# Patient Record
Sex: FEMALE | Race: WHITE | NOT HISPANIC OR LATINO | Employment: STUDENT | URBAN - METROPOLITAN AREA
[De-identification: names, ages, dates, MRNs, and addresses within clinical notes are randomized per-mention and may not be internally consistent; named-entity substitution may affect disease eponyms.]

---
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
## 2020-01-06 ENCOUNTER — CLINICAL SUPPORT (OUTPATIENT)
Dept: INTERNAL MEDICINE CLINIC | Facility: CLINIC | Age: 20
End: 2020-01-06
Payer: COMMERCIAL

## 2020-01-06 DIAGNOSIS — D50.8 IRON DEFICIENCY ANEMIA SECONDARY TO INADEQUATE DIETARY IRON INTAKE: Primary | ICD-10-CM

## 2020-01-06 DIAGNOSIS — E55.9 VITAMIN D DEFICIENCY: ICD-10-CM

## 2020-01-06 LAB
25(OH)D3 SERPL-MCNC: 37.8 NG/ML (ref 30–100)
ALBUMIN SERPL BCP-MCNC: 3.7 G/DL (ref 3.5–5)
ALP SERPL-CCNC: 54 U/L (ref 46–384)
ALT SERPL W P-5'-P-CCNC: 20 U/L (ref 12–78)
ANION GAP SERPL CALCULATED.3IONS-SCNC: 5 MMOL/L (ref 4–13)
AST SERPL W P-5'-P-CCNC: 12 U/L (ref 5–45)
BASOPHILS # BLD AUTO: 0.05 THOUSANDS/ΜL (ref 0–0.1)
BASOPHILS NFR BLD AUTO: 1 % (ref 0–1)
BILIRUB SERPL-MCNC: 0.38 MG/DL (ref 0.2–1)
BUN SERPL-MCNC: 9 MG/DL (ref 5–25)
CALCIUM SERPL-MCNC: 9.2 MG/DL (ref 8.3–10.1)
CHLORIDE SERPL-SCNC: 108 MMOL/L (ref 100–108)
CO2 SERPL-SCNC: 29 MMOL/L (ref 21–32)
CREAT SERPL-MCNC: 0.46 MG/DL (ref 0.6–1.3)
EOSINOPHIL # BLD AUTO: 0.08 THOUSAND/ΜL (ref 0–0.61)
EOSINOPHIL NFR BLD AUTO: 1 % (ref 0–6)
ERYTHROCYTE [DISTWIDTH] IN BLOOD BY AUTOMATED COUNT: 13 % (ref 11.6–15.1)
FERRITIN SERPL-MCNC: 34 NG/ML (ref 8–388)
GFR SERPL CREATININE-BSD FRML MDRD: 145 ML/MIN/1.73SQ M
GLUCOSE SERPL-MCNC: 84 MG/DL (ref 65–140)
HCT VFR BLD AUTO: 36.6 % (ref 34.8–46.1)
HGB BLD-MCNC: 12 G/DL (ref 11.5–15.4)
IMM GRANULOCYTES # BLD AUTO: 0.04 THOUSAND/UL (ref 0–0.2)
IMM GRANULOCYTES NFR BLD AUTO: 0 % (ref 0–2)
LYMPHOCYTES # BLD AUTO: 2.14 THOUSANDS/ΜL (ref 0.6–4.47)
LYMPHOCYTES NFR BLD AUTO: 23 % (ref 14–44)
MCH RBC QN AUTO: 29.3 PG (ref 26.8–34.3)
MCHC RBC AUTO-ENTMCNC: 32.8 G/DL (ref 31.4–37.4)
MCV RBC AUTO: 90 FL (ref 82–98)
MONOCYTES # BLD AUTO: 0.51 THOUSAND/ΜL (ref 0.17–1.22)
MONOCYTES NFR BLD AUTO: 5 % (ref 4–12)
NEUTROPHILS # BLD AUTO: 6.7 THOUSANDS/ΜL (ref 1.85–7.62)
NEUTS SEG NFR BLD AUTO: 70 % (ref 43–75)
NRBC BLD AUTO-RTO: 0 /100 WBCS
PLATELET # BLD AUTO: 341 THOUSANDS/UL (ref 149–390)
PMV BLD AUTO: 9.6 FL (ref 8.9–12.7)
POTASSIUM SERPL-SCNC: 3.8 MMOL/L (ref 3.5–5.3)
PROT SERPL-MCNC: 6.6 G/DL (ref 6.4–8.2)
RBC # BLD AUTO: 4.09 MILLION/UL (ref 3.81–5.12)
SODIUM SERPL-SCNC: 142 MMOL/L (ref 136–145)
WBC # BLD AUTO: 9.52 THOUSAND/UL (ref 4.31–10.16)

## 2020-01-06 PROCEDURE — 85025 COMPLETE CBC W/AUTO DIFF WBC: CPT | Performed by: FAMILY MEDICINE

## 2020-01-06 PROCEDURE — 82728 ASSAY OF FERRITIN: CPT | Performed by: FAMILY MEDICINE

## 2020-01-06 PROCEDURE — 82306 VITAMIN D 25 HYDROXY: CPT | Performed by: FAMILY MEDICINE

## 2020-01-06 PROCEDURE — 80053 COMPREHEN METABOLIC PANEL: CPT | Performed by: FAMILY MEDICINE

## 2020-01-06 PROCEDURE — 36415 COLL VENOUS BLD VENIPUNCTURE: CPT

## 2020-01-10 ENCOUNTER — OFFICE VISIT (OUTPATIENT)
Dept: INTERNAL MEDICINE CLINIC | Age: 20
End: 2020-01-10
Payer: COMMERCIAL

## 2020-01-10 VITALS
SYSTOLIC BLOOD PRESSURE: 138 MMHG | OXYGEN SATURATION: 97 % | TEMPERATURE: 98.5 F | WEIGHT: 276 LBS | HEART RATE: 74 BPM | DIASTOLIC BLOOD PRESSURE: 70 MMHG | HEIGHT: 64 IN | BODY MASS INDEX: 47.12 KG/M2

## 2020-01-10 DIAGNOSIS — D50.8 IRON DEFICIENCY ANEMIA SECONDARY TO INADEQUATE DIETARY IRON INTAKE: Primary | ICD-10-CM

## 2020-01-10 DIAGNOSIS — I10 ESSENTIAL HYPERTENSION: ICD-10-CM

## 2020-01-10 DIAGNOSIS — E55.9 VITAMIN D DEFICIENCY: ICD-10-CM

## 2020-01-10 PROBLEM — J18.9 PNEUMONIA: Status: RESOLVED | Noted: 2019-11-04 | Resolved: 2020-01-10

## 2020-01-10 PROBLEM — R68.89 FLU-LIKE SYMPTOMS: Status: RESOLVED | Noted: 2019-11-04 | Resolved: 2020-01-10

## 2020-01-10 PROCEDURE — 99214 OFFICE O/P EST MOD 30 MIN: CPT | Performed by: FAMILY MEDICINE

## 2020-01-28 DIAGNOSIS — E55.9 VITAMIN D DEFICIENCY: ICD-10-CM

## 2020-01-28 RX ORDER — ERGOCALCIFEROL 1.25 MG/1
50000 CAPSULE ORAL WEEKLY
Qty: 12 CAPSULE | Refills: 1 | OUTPATIENT
Start: 2020-01-28

## 2020-01-28 RX ORDER — ERGOCALCIFEROL 1.25 MG/1
50000 CAPSULE ORAL
Qty: 12 CAPSULE | Refills: 1 | Status: SHIPPED | OUTPATIENT
Start: 2020-01-28 | End: 2021-01-08 | Stop reason: SDUPTHER

## 2020-01-28 NOTE — TELEPHONE ENCOUNTER
Per Dr Sandra Chand to take Vitamin D2 50,000 units every 14 days  Dr Sandra Chand sent script  Pt  Is aware

## 2020-01-28 NOTE — TELEPHONE ENCOUNTER
Please contact patient:   Vitamin-D level improved compared to last her after ergocalciferol therapy  Now above 30  At this point, will defer on refilling high-dose vitamin-D and recommend taking over-the-counter vitamin-D, 3235-7435 IU daily

## 2020-01-28 NOTE — PROGRESS NOTES
Assessment/Plan:    1  Iron deficiency anemia secondary to inadequate dietary iron intake  -     CBC and differential; Future  -     Ferritin; Future    2  Essential hypertension    3  Vitamin D deficiency  -     Vitamin D 25 hydroxy; Future      BMI Counseling: Body mass index is 47 38 kg/m²  The BMI is above normal  Nutrition recommendations include moderation in carbohydrate intake  Exercise recommendations include exercising 3-5 times per week  No pharmacotherapy was ordered  Continue with vitamin-D weekly as directed previously and then take 5000 International Units every day  Repeat blood work in 6 months  There are no Patient Instructions on file for this visit  No follow-ups on file  Subjective:      Patient ID: Steven Perez is a 23 y o  female  Chief Complaint   Patient presents with    Follow-up     review labs 1/6/20    Foot Pain     right side foot/ tendon pain in the AM when getting out of bed        HPI     Vitamin-D deficiency, last check March 2018, level is 24  Taking over-the-counter vitamin-D daily  July 2019, taking weekly vitamin-D and tolerating well  January 2020, levels of finally improved to 37 8  Taking vitamin-D as directed  Weekly     Hypertension, used to follow up with a pediatric cardiologist for hypertension but recently is now out of the age range  Like to discuss changing medications due to risk of fetal side effects  She is not currently sexually active and has no plans of conceiving but would like to stay on the safe side  Her blood pressures have been well controlled and she was diagnosed at the age of 6  She does not know the details of that and old records are not available from [facility]  She does not check her blood pressure at home but occasionally does when she goes to the store and is well controlled  July 2019, was seen for a routine physical in June and blood pressure was elevated  At that time methyldopa was increased from b i d   To t i d  However she states that she often forgets the middle dose and has been routinely taking it b i d  Rufina [place] Blood pressure today is is well controlled  She states home blood pressures are well controlled  Tolerating it well January 2020, compliant with medication and tolerating it well  Home blood pressure readings are not checked    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      The following portions of the patient's history were reviewed and updated as appropriate: allergies, current medications, past family history, past medical history, past social history, past surgical history and problem list     Review of Systems      Constitutional:  Denies fever or chills   Eyes:  Denies double or blurry vision  HENT:  Denies nasal congestion or sore throat   Respiratory:  Denies cough or shortness of breath or wheezing  Cardiovascular:  Denies palpitations or chest pain  GI:  Denies abdominal pain, nausea, or vomiting  Integument:  Denies rash , no open areas  Neurologic:  Denies headache or focal weakness        Current Outpatient Medications   Medication Sig Dispense Refill    Calcium Carbonate-Vitamin D (CALCIUM 600+D) 600-200 MG-UNIT TABS Take 1 tablet by mouth daily      clindamycin (CLEOCIN T) 1 % lotion Apply 1 application topically 2 (two) times a day as needed       DAILY MULTIPLE VITAMINS PO Take 1 tablet by mouth daily       ergocalciferol (VITAMIN D2) 50,000 units Take 1 capsule (50,000 Units total) by mouth once a week 12 capsule 1    fluticasone (FLONASE) 50 mcg/act nasal spray 2 sprays into each nostril daily      IRON, FERROUS GLUCONATE, PO Take 1 tablet by mouth every other day      levonorgestrel (KYLEENA) 19 5 MG intrauterine device 1 Intra Uterine Device by Intrauterine route once      methyldopa (ALDOMET) 250 mg tablet Take 1 tablet (250 mg total) by mouth 2 (two) times a day 12 tablet 0    Probiotic Product (DIGESTIVE ADVANTAGE PO) Take 1 capsule by mouth every other day No current facility-administered medications for this visit          Objective:    /70 (BP Location: Left arm, Patient Position: Sitting, Cuff Size: Large)   Pulse 74   Temp 98 5 °F (36 9 °C) (Tympanic)   Ht 5' 4" (1 626 m)   Wt 125 kg (276 lb)   SpO2 97%   BMI 47 38 kg/m²        Physical Exam    Constitutional:  Well developed, well nourished, no acute distress, non-toxic appearance   Eyes:  PERRL, conjunctiva normal , non icteric sclera  HENT:  Atraumatic, oropharynx moist  Neck-  supple   Respiratory:  CTA b/l, normal breath sounds, no rales, no wheezing   Cardiovascular:  RRR, no murmurs, no LE edema b/l  GI:  Soft, nondistended, normal bowel sounds x 4, nontender, no organomegaly, no mass, no rebound, no guarding   Neurologic:  no focal deficits noted   Psychiatric:  Speech and behavior appropriate , AAO x 3           Bimal Vincent, DO

## 2020-01-28 NOTE — TELEPHONE ENCOUNTER
Patient is requesting a refill on her Vitamin D 50,000 units that she is taking      Patient uses [address] [facility] [address]

## 2020-03-12 DIAGNOSIS — I10 ESSENTIAL HYPERTENSION: ICD-10-CM

## 2020-03-12 RX ORDER — METHYLDOPA 250 MG/1
TABLET, FILM COATED ORAL
Qty: 180 TABLET | Refills: 0 | OUTPATIENT
Start: 2020-03-12

## 2020-03-16 DIAGNOSIS — I10 ESSENTIAL HYPERTENSION: ICD-10-CM

## 2020-03-16 RX ORDER — METHYLDOPA 250 MG/1
250 TABLET, FILM COATED ORAL 2 TIMES DAILY
Qty: 180 TABLET | Refills: 1 | Status: SHIPPED | OUTPATIENT
Start: 2020-03-16 | End: 2020-08-07 | Stop reason: SDUPTHER

## 2020-03-16 NOTE — TELEPHONE ENCOUNTER
3/16/20 pt last seen 1/10/20, next appt 7/28/20, last filled on 11/27/19 amt 12/0 takes bid, previous filled 180/1

## 2020-07-30 ENCOUNTER — APPOINTMENT (OUTPATIENT)
Dept: LAB | Age: 20
End: 2020-07-30
Payer: COMMERCIAL

## 2020-07-30 DIAGNOSIS — D50.8 IRON DEFICIENCY ANEMIA SECONDARY TO INADEQUATE DIETARY IRON INTAKE: ICD-10-CM

## 2020-07-30 DIAGNOSIS — E55.9 VITAMIN D DEFICIENCY: ICD-10-CM

## 2020-07-30 LAB
25(OH)D3 SERPL-MCNC: 43.2 NG/ML (ref 30–100)
BASOPHILS # BLD AUTO: 0.05 THOUSANDS/ΜL (ref 0–0.1)
BASOPHILS NFR BLD AUTO: 1 % (ref 0–1)
EOSINOPHIL # BLD AUTO: 0.11 THOUSAND/ΜL (ref 0–0.61)
EOSINOPHIL NFR BLD AUTO: 1 % (ref 0–6)
ERYTHROCYTE [DISTWIDTH] IN BLOOD BY AUTOMATED COUNT: 12 % (ref 11.6–15.1)
FERRITIN SERPL-MCNC: 44 NG/ML (ref 8–388)
HCT VFR BLD AUTO: 40.8 % (ref 34.8–46.1)
HGB BLD-MCNC: 13.1 G/DL (ref 11.5–15.4)
IMM GRANULOCYTES # BLD AUTO: 0.02 THOUSAND/UL (ref 0–0.2)
IMM GRANULOCYTES NFR BLD AUTO: 0 % (ref 0–2)
LYMPHOCYTES # BLD AUTO: 2.65 THOUSANDS/ΜL (ref 0.6–4.47)
LYMPHOCYTES NFR BLD AUTO: 30 % (ref 14–44)
MCH RBC QN AUTO: 30.1 PG (ref 26.8–34.3)
MCHC RBC AUTO-ENTMCNC: 32.1 G/DL (ref 31.4–37.4)
MCV RBC AUTO: 94 FL (ref 82–98)
MONOCYTES # BLD AUTO: 0.61 THOUSAND/ΜL (ref 0.17–1.22)
MONOCYTES NFR BLD AUTO: 7 % (ref 4–12)
NEUTROPHILS # BLD AUTO: 5.54 THOUSANDS/ΜL (ref 1.85–7.62)
NEUTS SEG NFR BLD AUTO: 61 % (ref 43–75)
NRBC BLD AUTO-RTO: 0 /100 WBCS
PLATELET # BLD AUTO: 352 THOUSANDS/UL (ref 149–390)
PMV BLD AUTO: 10.1 FL (ref 8.9–12.7)
RBC # BLD AUTO: 4.35 MILLION/UL (ref 3.81–5.12)
WBC # BLD AUTO: 8.98 THOUSAND/UL (ref 4.31–10.16)

## 2020-07-30 PROCEDURE — 82728 ASSAY OF FERRITIN: CPT

## 2020-07-30 PROCEDURE — 85025 COMPLETE CBC W/AUTO DIFF WBC: CPT

## 2020-07-30 PROCEDURE — 36415 COLL VENOUS BLD VENIPUNCTURE: CPT

## 2020-07-30 PROCEDURE — 82306 VITAMIN D 25 HYDROXY: CPT

## 2020-08-07 ENCOUNTER — OFFICE VISIT (OUTPATIENT)
Dept: INTERNAL MEDICINE CLINIC | Age: 20
End: 2020-08-07
Payer: COMMERCIAL

## 2020-08-07 VITALS
OXYGEN SATURATION: 98 % | WEIGHT: 268 LBS | DIASTOLIC BLOOD PRESSURE: 60 MMHG | HEART RATE: 86 BPM | TEMPERATURE: 99.5 F | SYSTOLIC BLOOD PRESSURE: 116 MMHG | BODY MASS INDEX: 45.75 KG/M2 | HEIGHT: 64 IN

## 2020-08-07 DIAGNOSIS — D50.8 IRON DEFICIENCY ANEMIA SECONDARY TO INADEQUATE DIETARY IRON INTAKE: ICD-10-CM

## 2020-08-07 DIAGNOSIS — E55.9 VITAMIN D DEFICIENCY: ICD-10-CM

## 2020-08-07 DIAGNOSIS — I10 ESSENTIAL HYPERTENSION: Primary | ICD-10-CM

## 2020-08-07 PROBLEM — K21.9 GASTROESOPHAGEAL REFLUX DISEASE WITHOUT ESOPHAGITIS: Status: RESOLVED | Noted: 2019-06-28 | Resolved: 2020-08-07

## 2020-08-07 PROCEDURE — 1036F TOBACCO NON-USER: CPT | Performed by: FAMILY MEDICINE

## 2020-08-07 PROCEDURE — 99214 OFFICE O/P EST MOD 30 MIN: CPT | Performed by: FAMILY MEDICINE

## 2020-08-07 PROCEDURE — 3078F DIAST BP <80 MM HG: CPT | Performed by: FAMILY MEDICINE

## 2020-08-07 PROCEDURE — 3008F BODY MASS INDEX DOCD: CPT | Performed by: FAMILY MEDICINE

## 2020-08-07 PROCEDURE — 3074F SYST BP LT 130 MM HG: CPT | Performed by: FAMILY MEDICINE

## 2020-08-07 RX ORDER — METHYLDOPA 250 MG/1
250 TABLET, FILM COATED ORAL 2 TIMES DAILY
Qty: 180 TABLET | Refills: 1 | Status: SHIPPED | OUTPATIENT
Start: 2020-08-07 | End: 2021-03-10

## 2020-08-07 NOTE — PROGRESS NOTES
Assessment/Plan:    1  Essential hypertension  -     methyldopa (ALDOMET) 250 mg tablet; Take 1 tablet (250 mg total) by mouth 2 (two) times a day    2  Iron deficiency anemia secondary to inadequate dietary iron intake    3  Vitamin D deficiency      No need for multivitamin with iron  If eating and drinking okay no need for multivitamin at all  Okay to change iron to Monday Wednesday Friday only  Return 6 months for blood pressure check, no changes in blood pressure medication  Lab work should be done yearly now or p r n     All questions and concerns addressed  Lab work reviewed with her  BMI Counseling: Body mass index is 46 32 kg/m²  The BMI is above normal  Nutrition recommendations include moderation in carbohydrate intake  Exercise recommendations include exercising 3-5 times per week  No pharmacotherapy was ordered  There are no Patient Instructions on file for this visit  Return in about 6 months (around 2/7/2021) for Recheck  Subjective:      Patient ID: Renea Theodore is a 21 y o  female  Chief Complaint   Patient presents with    Follow-up     6 month fu- htn        HPI    Vitamin-D deficiency, last check March 2018, level is 24  Taking over-the-counter vitamin-D daily   July 2019, taking weekly vitamin-D and tolerating well  January 2020, levels of finally improved to 37 8  Taking vitamin-D as directed  Weekly  August 2020, taking weekly vitamin-D without any issues  Had recent lab work done     Hypertension, used to follow up with a pediatric cardiologist for hypertension but recently is now out of the age range  Halina Weber to discuss changing medications due to risk of fetal side effects  Leslie Chapman is not currently sexually active and has no plans of conceiving but would like to stay on the safe side  Cheri Orozco blood pressures have been well controlled and she was diagnosed at the age of 6   She does not know the details of that and old records are not available from [facility]   She does not check her blood pressure at home but occasionally does when she goes to the store and is well controlled  July 2019, was seen for a routine physical in June and blood pressure was elevated   At that time methyldopa was increased from b i d  To t i d  However she states that she often forgets the middle dose and has been routinely taking it b i d     Blood pressure today is is well controlled   She states home blood pressures are well controlled   Tolerating it well January 2020, compliant with medication and tolerating it well  Home blood pressure readings are not checked  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  August 2020, has been working 3 jobs and will soon start her 27 year in college  Lost 10 lb but is not quite sure how  She remains very active and is taking her medications and is compliant    Iron deficiency anemia, taking iron and a multivitamin  She is not sure if her multivitamin has iron    Had lab work done and his hemoglobin is stable       The following portions of the patient's history were reviewed and updated as appropriate: allergies, current medications, past family history, past medical history, past social history, past surgical history and problem list     Review of Systems        Constitutional:  Denies fever or chills ,10 lb weight loss since last visit  Eyes:  Denies double or blurry vision  HENT:  Denies nasal congestion or sore throat   Respiratory:  Denies cough or shortness of breath or wheezing  Cardiovascular:  Denies palpitations or chest pain no heartburn  GI:  Denies abdominal pain, nausea, or vomiting  Integument:  Denies rash , no open areas  Neurologic:  Denies headache or focal weakness no dizziness      Current Outpatient Medications   Medication Sig Dispense Refill    Calcium Carbonate-Vitamin D (CALCIUM 600+D) 600-200 MG-UNIT TABS Take 1 tablet by mouth daily      clindamycin (CLEOCIN T) 1 % lotion Apply 1 application topically 2 (two) times a day as needed       DAILY MULTIPLE VITAMINS PO Take 1 tablet by mouth daily       ergocalciferol (VITAMIN D2) 50,000 units Take 1 capsule (50,000 Units total) by mouth every 14 (fourteen) days 12 capsule 1    fluticasone (FLONASE) 50 mcg/act nasal spray 2 sprays into each nostril daily      IRON, FERROUS GLUCONATE, PO Take 1 tablet by mouth every other day      levonorgestrel (KYLEENA) 19 5 MG intrauterine device 1 Intra Uterine Device by Intrauterine route once      methyldopa (ALDOMET) 250 mg tablet Take 1 tablet (250 mg total) by mouth 2 (two) times a day 180 tablet 1    Probiotic Product (DIGESTIVE ADVANTAGE PO) Take 1 capsule by mouth every other day       No current facility-administered medications for this visit          Objective:    /60 (BP Location: Left arm, Patient Position: Sitting, Cuff Size: Large)   Pulse 86   Temp 99 5 °F (37 5 °C) (Tympanic)   Ht 5' 3 78" (1 62 m) Comment: shoes off  Wt 122 kg (268 lb) Comment: shoes off  SpO2 98%   BMI 46 32 kg/m²        Physical Exam       Constitutional:  Well developed, well nourished, no acute distress, non-toxic appearance   Eyes:  PERRL, conjunctiva normal , non icteric sclera  HENT:  Atraumatic, oropharynx moist  Neck-  supple   Respiratory:  CTA b/l, normal breath sounds, no rales, no wheezing   Cardiovascular:  RRR, no murmurs, no LE edema b/l  GI:  Soft, nondistended, normal bowel sounds x 4, nontender, no organomegaly, no mass, no rebound, no guarding   Neurologic:  no focal deficits noted   Psychiatric:  Speech and behavior appropriate , AAO x 3        Ananda Burows, DO

## 2020-09-25 ENCOUNTER — ANNUAL EXAM (OUTPATIENT)
Dept: OBGYN CLINIC | Facility: CLINIC | Age: 20
End: 2020-09-25
Payer: COMMERCIAL

## 2020-09-25 VITALS
BODY MASS INDEX: 48.37 KG/M2 | HEIGHT: 63 IN | TEMPERATURE: 98.9 F | DIASTOLIC BLOOD PRESSURE: 80 MMHG | SYSTOLIC BLOOD PRESSURE: 138 MMHG | WEIGHT: 273 LBS

## 2020-09-25 DIAGNOSIS — Z01.419 ENCOUNTER FOR GYNECOLOGICAL EXAMINATION WITHOUT ABNORMAL FINDING: ICD-10-CM

## 2020-09-25 DIAGNOSIS — Z11.3 SCREEN FOR STD (SEXUALLY TRANSMITTED DISEASE): Primary | ICD-10-CM

## 2020-09-25 PROCEDURE — 99395 PREV VISIT EST AGE 18-39: CPT | Performed by: PHYSICIAN ASSISTANT

## 2020-09-25 PROCEDURE — 87591 N.GONORRHOEAE DNA AMP PROB: CPT | Performed by: PHYSICIAN ASSISTANT

## 2020-09-25 PROCEDURE — 87491 CHLMYD TRACH DNA AMP PROBE: CPT | Performed by: PHYSICIAN ASSISTANT

## 2020-09-25 NOTE — PROGRESS NOTES
Chata Cruz  2000      CC:  Yearly exam    S:  21 y o  female here for yearly exam  She has no complaints  Her cycles are regular, not heavy or crampy  With her Rodrigo Stammer, they last 8 days instead of her previous 7  This does not bother her  Sexual activity: She is sexually active with a male partner of 2 months and uses Rodrigo Stammer (8/21/19) for contraception  STD testing: She does want STD testing today  Gardasil: She has completed the Gardasil series  We reviewed ASCCP guidelines for Pap testing today       Current Outpatient Medications:     Calcium Carbonate-Vitamin D (CALCIUM 600+D) 600-200 MG-UNIT TABS, Take 1 tablet by mouth daily, Disp: , Rfl:     clindamycin (CLEOCIN T) 1 % lotion, Apply 1 application topically 2 (two) times a day as needed , Disp: , Rfl:     DAILY MULTIPLE VITAMINS PO, Take 1 tablet by mouth daily , Disp: , Rfl:     ergocalciferol (VITAMIN D2) 50,000 units, Take 1 capsule (50,000 Units total) by mouth every 14 (fourteen) days, Disp: 12 capsule, Rfl: 1    fluticasone (FLONASE) 50 mcg/act nasal spray, 2 sprays into each nostril daily, Disp: , Rfl:     IRON, FERROUS GLUCONATE, PO, Take 1 tablet by mouth every other day, Disp: , Rfl:     levonorgestrel (KYLEENA) 19 5 MG intrauterine device, 1 Intra Uterine Device by Intrauterine route once, Disp: , Rfl:     methyldopa (ALDOMET) 250 mg tablet, Take 1 tablet (250 mg total) by mouth 2 (two) times a day, Disp: 180 tablet, Rfl: 1    Probiotic Product (DIGESTIVE ADVANTAGE PO), Take 1 capsule by mouth every other day, Disp: , Rfl:   Social History     Socioeconomic History    Marital status: Single     Spouse name: Not on file    Number of children: Not on file    Years of education: Not on file    Highest education level: Not on file   Occupational History    Not on file   Social Needs    Financial resource strain: Not on file    Food insecurity     Worry: Not on file     Inability: Not on file   [employer] Patient Education     Folliculitis (Child)  Folliculitis is an infection of a hair follicle. A hair follicle is the little pocket where a hair grows out of the skin. Bacteria normally live on the skin. But sometimes bacteria can get trapped in a follicle and cause inflammation. This causes a bumpy rash. The area over the follicles is red and raised. It may itch or be painful. The bumps may have fluid (pus) inside. The pus may leak and then form crusts. Sores can spread to other areas of the body. Once it goes away, folliculitis can come back at any time.  Folliculitis can happen anywhere on a child’s body that hair grows. It can be caused by rubbing from tight clothing. It may also occur if a hair follicle is blocked by a bandage. Shaving the legs or the face may also cause folliculitis.   Sores often go away in a few days with no treatment. In some cases, medication may be given. A small piece of skin or pus may be taken to find the type of bacteria causing the infection.  Home Care  The health care provider may prescribe an antibiotic or antifungal cream or ointment.  Oral antibiotics may also be prescribed. You may also be given an anti-itch medication or lotion for your child. Follow all instructions when using these medications.  General care:  · Apply warm, moist compresses to the sores for 20 minutes up to 3 times a day. You can make a compress by soaking a cloth in warm water. Squeeze out excess water.  · Do not let your child cut, poke, or squeeze the sores. This can be painful and spread infection.  · Make sure your child does not scratch the affected area. Scratching can delay healing.  · If the sores leak fluid, cover the area with a nonstick gauze bandage. Use as little tape as possible. Then call your health care provider and follow all instructions. Carefully discard all soiled bandages.  · Dress your child in loose cotton clothing. Change your child’s clothes daily.  · Change sheets and blankets if  needs     Medical: Not on file     Non-medical: Not on file   Tobacco Use    Smoking status: Never Smoker    Smokeless tobacco: Never Used   Substance and Sexual Activity    Alcohol use: Yes     Frequency: 2-4 times a month     Drinks per session: 1 or 2     Binge frequency: Never    Drug use: No    Sexual activity: Yes     Partners: Male     Birth control/protection: I U D , Condom Male   Lifestyle    Physical activity     Days per week: Not on file     Minutes per session: Not on file    Stress: Not on file   Relationships    Social connections     Talks on phone: Not on file     Gets together: Not on file     Attends [employer] service: Not on file     Active member of club or organization: Not on file     Attends meetings of clubs or organizations: Not on file     Relationship status: Not on file    Intimate partner violence     Fear of current or ex partner: Not on file     Emotionally abused: Not on file     Physically abused: Not on file     Forced sexual activity: Not on file   Other Topics Concern    Not on file   Social History Narrative    Not on file     Family History   Problem Relation Age of Onset    Allergic rhinitis Mother     Hypertension Mother     Diabetes Mother     Cancer Other     Diabetes Other     Thyroid disease Maternal Grandmother      Past Medical History:   Diagnosis Date    Acne     resolved-12/3/2015    Eczema     last assessed-12/3/2015    Gastroesophageal reflux disease without esophagitis 6/28/2019    Hypertension     last assessed-12/11/2017    Iron deficiency anemia 5/21/2019    Pneumonia 11/4/2019    Subluxation of left patella     last assessed-3/31/2015       Review of Systems   Respiratory: Negative  Cardiovascular: Negative  Gastrointestinal: Negative for constipation and diarrhea  Genitourinary: Negative for difficulty urinating, pelvic pain, vaginal bleeding, vaginal discharge, itching or odor      O:   Blood pressure 138/80, temperature 98 9 they are soiled by pus. Wash all clothes, towels, sheets, and cloth diapers in soap and hot water. Do not let your child share clothes, towels, or sheets with other family members.  · If your child’s sores are on the buttocks, discard wipes and disposable diapers with care.  · Don’t soak the sores in bath water. This can spread infection. Instead, keep the area clean by gently washing sores with soap and warm water.  · Wash your hands and have your child wash his or her hands often to stop the bacteria from spreading to the people. You can also use an antibacterial gel to keep hands clean.   Follow-up Care  Follow up with your child’s health care provider if the sores start to leak fluid.  Special Note to Parents  Wash your hands with soap and warm water before and after caring for your child. This is to avoid spreading infection.  When to Get Medical Care  Get prompt medical attention for your child if any of these occur:  · Fever of 100.4°F (38°C) or higher, rectal  · Redness or swelling that gets worse  · Pain that gets worse  · Foul-smelling fluid leaking from the skin  © 5315-9192 [employer]. [address] [place] [address]. All rights reserved. This information is not intended as a substitute for professional medical care. Always follow your healthcare professional's instructions.         Patient Education   Patient Education     Folliculitis (Child)  Folliculitis is an infection of a hair follicle. A hair follicle is the little pocket where a hair grows out of the skin. Bacteria normally live on the skin. But sometimes bacteria can get trapped in a follicle and cause inflammation. This causes a bumpy rash. The area over the follicles is red and raised. It may itch or be painful. The bumps may have fluid (pus) inside. The pus may leak and then form crusts. Sores can spread to other areas of the body. Once it goes away, folliculitis can come back at any time.  Folliculitis can happen  anywhere on a child’s body that hair grows. It can be caused by rubbing from tight clothing. It may also occur if a hair follicle is blocked by a bandage. Shaving the legs or the face may also cause folliculitis.   Sores often go away in a few days with no treatment. In some cases, medication may be given. A small piece of skin or pus may be taken to find the type of bacteria causing the infection.  Home Care  · The health care provider may prescribe an antibiotic or antifungal cream or ointment.  Oral antibiotics may also be prescribed. You may also be given an anti-itch medication or lotion for your child. Follow all instructions when using these medicationsist compresses to the sores for 20 minutes up to 3 times a day. You can make a compress by soaking a cloth in warm water. Squeeze out excess water.  · Do not let your child cut, poke, or squeeze the sores. This can be painful and spread infection.  · Make sure your child does not scratch the affected area. Scratching can delay healing.  · Apply mupirocin three times a day for at least 7 days  · Take sulfa antibiotic twice a day for 7 days  · Recheck if the rash seems to be spreading or at any time if redness or worsening fever.  If the sores leak fluid.  General care:  · Apply warm, mo, cover the area with a nonstick gauze bandage. Use as little tape as possible. Then call your health care provider and follow all instructions. Carefully discard all soiled bandages.  · Dress your child in loose cotton clothing. Change your child’s clothes daily.  · Change sheets and blankets if they are soiled by pus. Wash all clothes, towels, sheets, and cloth diapers in soap and hot water. Do not let your child share clothes, towels, or sheets with other family members.  · If your child’s sores are on the buttocks, discard wipes and disposable diapers with care.  · Don’t soak the sores in bath water. This can spread infection. Instead, keep the area clean by gently washing  °F (37 2 °C), temperature source Tympanic, height 5' 2 99" (1 6 m), weight 124 kg (273 lb), last menstrual period 09/01/2020  Patient appears well and is not in distress  Neck is supple without masses  Breasts are symmetrical without mass, tenderness, nipple discharge, skin changes or adenopathy  Abdomen is soft and nontender without masses  External genitals are normal  Vagina is normal  Cervix is normal, IUD strings are normal    A:   Yearly exam      P:   GC/chlamydia sent today    Consistent condom use recommended if sexually active  She will return in one year for her yearly exam or sooner as needed  sores with soap and warm water.  · Wash your hands and have your child wash his or her hands often to stop the bacteria from spreading to the people. You can also use an antibacterial gel to keep hands clean.   Follow-up Care  Follow up with your child’s health care provider if the sores start to leak fluid.  Special Note to Parents  Wash your hands with soap and warm water before and after caring for your child. This is to avoid spreading infection.  When to Get Medical Care  Get prompt medical attention for your child if any of these occur:  · Fever of 100.4°F (38°C) or higher, rectal  · Redness or swelling that gets worse  · Pain that gets worse  · Foul-smelling fluid leaking from the skin  © 7113-6124 [employer]. [address] [place] [address], [place] [address]. All rights reserved. This information is not intended as a substitute for professional medical care. Always follow your healthcare professional's instructions.           Viral Upper Respiratory Illness (Child)  Your child has a viral upper respiratory illness (URI), which is another term for the common cold. The virus is contagious during the first few days. It is spread through the air by coughing, sneezing, or by direct contact (touching your sick child then touching your own eyes, nose, or mouth). Frequent handwashing will decrease risk of spread. Most viral illnesses resolve within 7 to 14 days with rest and simple home remedies. However, they may sometimes last up to 4 weeks. Antibiotics will not kill a virus and are generally not prescribed for this condition.    Home care  · Fluids: Fever increases water loss from the body. Encourage your child to drink lots of fluids to loosen lung secretions and make it easier to breathe. For infants under 1 year old, continue regular formula or breast feedings. Between feedings, give oral rehydration solution. This is available from drugstores and grocery stores without a prescription. For children over  1 year old, give plenty of fluids, such as water, juice, gelatin water, soda without caffeine, ginger ale, lemonade, or ice pops.  · Eating: If your child doesn't want to eat solid foods, it's OK for a few days, as long as he or she drinks lots of fluid.  · Rest: Keep children with fever at home resting or playing quietly until the fever is gone. Encourage frequent naps. Your child may return to day care or school when the fever is gone and he or she is eating well and feeling better.  · Sleep: Periods of sleeplessness and irritability are common. A congested child will sleep best with the head and upper body propped up on pillows or with the head of the bed frame raised on a 6-inch block. An infant may sleep in a car seat placed in the crib or in a baby swing. If you use a car seat or baby swing, always make certain the baby is safely fastened in the device.  · Cough: Coughing is a normal part of this illness. A cool mist humidifier at the bedside may be helpful. Be sure to clean the humidifier every day to prevent mold. Over-the-counter cough and cold medicines have not proved to be any more helpful than a placebo (syrup with no medicine in it). In addition, these medicines can produce serious side effects, especially in infants under 2 years of age. Do not give over-the-counter cough and cold medicines to children under 6 years unless your healthcare provider has specifically advised you to do so. Also, don’t expose your child to cigarette smoke. It can make the cough worse.  · Nasal congestion: Suction the nose of infants with a bulb syringe. You may put 2 to 3 drops of saltwater (saline) nose drops in each nostril before suctioning. This helps thin and remove secretions. Saline nose drops are available without a prescription. You can also use ¼ teaspoon of table salt dissolved in 1 cup of water.  · Fever: Use children’s acetaminophen for fever, fussiness, or discomfort, unless another medicine was prescribed.  In infants over 6 months of age, you may use children’s ibuprofen or acetaminophen. (Note: If your child has chronic liver or kidney disease or has ever had a stomach ulcer or gastrointestinal bleeding, talk with your healthcare provider before using these medicines.) Aspirin should never be given to anyone younger than 18 years of age who is ill with a viral infection or fever. It may cause severe liver or brain damage.  · Preventing spread: Washing your hands before and after touching your sick child will help prevent a new infection. It will also help prevent the spread of this viral illness to yourself and other children.  Follow-up care  Follow up with your healthcare provider, or as advised.  When to seek medical advice  For a usually healthy child, call your child's healthcare provider right away if any of these occur:  · A fever, as follows:  ¨ Your child is 3 months old or younger and has a fever of 100.4°F (38°C) or higher. Get medical care right away. Fever in a young baby can be a sign of a dangerous infection.  ¨ Your child is of any age and has repeated fevers above 104°F (40°C).  ¨ Your child is younger than 2 years of age and a fever of 100.4°F (38°C) continues for more than 1 day.  ¨ Your child is 2 years old or older and a fever of 100.4°F (38°C) continues for more than 3 days.  · Earache, sinus pain, stiff or painful neck, headache, repeated diarrhea, or vomiting.  · Unusual fussiness.  · A new rash appears.  · Your child is dehydrated, with one or more of these symptoms:  ¨ No tears when crying.  ¨ “Sunken” eyes or a dry mouth.  ¨ No wet diapers for 8 hours in infants.  ¨ Reduced urine output in older children.  Call 911, or get immediate medical care  Contact emergency services if any of these occur:  · Increased wheezing or difficulty breathing  · Unusual drowsiness or confusion  · Fast breathing, as follows:  ¨ Birth to 6 weeks: over 60 breaths per minute.  ¨ 6 weeks to 2 years: over 45  breaths per minute.  ¨ 3 to 6 years: over 35 breaths per minute.  ¨ 7 to 10 years: over 30 breaths per minute.  ¨ Older than 10 years: over 25 breaths per minute.  © 7179-3956 [employer]. [address] [place] [address], [place] [address]. All rights reserved. This information is not intended as a substitute for professional medical care. Always follow your healthcare professional's instructions.

## 2020-10-02 LAB
C TRACH DNA SPEC QL NAA+PROBE: NEGATIVE
N GONORRHOEA DNA SPEC QL NAA+PROBE: NEGATIVE

## 2020-11-04 DIAGNOSIS — E55.9 VITAMIN D DEFICIENCY: ICD-10-CM

## 2020-11-05 RX ORDER — ERGOCALCIFEROL 1.25 MG/1
CAPSULE ORAL
Qty: 6 CAPSULE | Refills: 0 | OUTPATIENT
Start: 2020-11-05

## 2021-01-08 DIAGNOSIS — E55.9 VITAMIN D DEFICIENCY: ICD-10-CM

## 2021-01-08 RX ORDER — ERGOCALCIFEROL 1.25 MG/1
50000 CAPSULE ORAL
Qty: 12 CAPSULE | Refills: 1 | Status: SHIPPED | OUTPATIENT
Start: 2021-01-08 | End: 2021-07-20

## 2021-01-11 DIAGNOSIS — I10 ESSENTIAL HYPERTENSION: Primary | ICD-10-CM

## 2021-01-11 RX ORDER — METHYLDOPA 500 MG/1
500 TABLET, FILM COATED ORAL 3 TIMES DAILY
COMMUNITY
End: 2021-01-11 | Stop reason: SDUPTHER

## 2021-01-11 RX ORDER — METHYLDOPA 500 MG/1
500 TABLET, FILM COATED ORAL DAILY
Qty: 90 TABLET | Refills: 1 | Status: SHIPPED | OUTPATIENT
Start: 2021-01-11 | End: 2021-01-18

## 2021-01-11 NOTE — TELEPHONE ENCOUNTER
1/11/2021  Dr Jayy Escobar-- script was originally  filled  For 250 mg bid-- the 250 mg is on a long term  back order per [facility]- they are out of  All strengths  - please send 500 mg - 1/2 tab bid - still a total of 500 mg daily  [facility]

## 2021-01-18 ENCOUNTER — OFFICE VISIT (OUTPATIENT)
Dept: INTERNAL MEDICINE CLINIC | Facility: CLINIC | Age: 21
End: 2021-01-18
Payer: COMMERCIAL

## 2021-01-18 VITALS
HEIGHT: 62 IN | OXYGEN SATURATION: 98 % | SYSTOLIC BLOOD PRESSURE: 158 MMHG | HEART RATE: 94 BPM | DIASTOLIC BLOOD PRESSURE: 100 MMHG | BODY MASS INDEX: 52.26 KG/M2 | WEIGHT: 284 LBS | TEMPERATURE: 98 F

## 2021-01-18 DIAGNOSIS — Z11.52 ENCOUNTER FOR SCREENING FOR COVID-19: ICD-10-CM

## 2021-01-18 DIAGNOSIS — I10 ESSENTIAL HYPERTENSION: Primary | ICD-10-CM

## 2021-01-18 DIAGNOSIS — L70.0 ACNE VULGARIS: ICD-10-CM

## 2021-01-18 DIAGNOSIS — J01.00 ACUTE NON-RECURRENT MAXILLARY SINUSITIS: ICD-10-CM

## 2021-01-18 PROBLEM — J01.90 ACUTE SINUSITIS: Status: ACTIVE | Noted: 2021-01-18

## 2021-01-18 PROCEDURE — 99213 OFFICE O/P EST LOW 20 MIN: CPT | Performed by: INTERNAL MEDICINE

## 2021-01-18 RX ORDER — CLINDAMYCIN PHOSPHATE 10 UG/ML
1 LOTION TOPICAL 2 TIMES DAILY PRN
Qty: 60 ML | Refills: 1 | Status: SHIPPED | OUTPATIENT
Start: 2021-01-18

## 2021-01-18 NOTE — ASSESSMENT & PLAN NOTE
Defer on antibiotics at this time  Discussed continuing over-the-counter second-generation antihistamines, Flonase, and continue over-the-counter decongestants however recommended she not take any decongestants with pseudoephedrine/ephedrine as these may cause elevation in blood pressure

## 2021-01-18 NOTE — PROGRESS NOTES
Assessment/Plan:    Acute sinusitis  Defer on antibiotics at this time  Discussed continuing over-the-counter second-generation antihistamines, Flonase, and continue over-the-counter decongestants however recommended she not take any decongestants with pseudoephedrine/ephedrine as these may cause elevation in blood pressure  Hypertension  Elevated today however she is acutely ill with a sinus infection and took an over-the-counter decongestant which may have ephedrine/pseudoephedrine  I advised she ensure that decongestant she is taking not have these components and for her to continue to check her blood pressure daily and contact office in 1 -2 weeks with blood pressure readings  For now, continue methyldopa 500 mg daily  Encounter for screening for COVID-19  She is required to have COVID testing prior to returning to school  COVID test ordered  Diagnoses and all orders for this visit:    Essential hypertension    Acne vulgaris  -     clindamycin (CLEOCIN T) 1 % lotion; Apply 1 application topically 2 (two) times a day as needed (ezema)    Encounter for screening for COVID-19  -     Novel Coronavirus (Covid-19),PCR [facility] - Collected at   [facility] or [facility]; Future    Acute non-recurrent maxillary sinusitis                  Subjective:      Patient ID: Nazia Mcpherson is a 21 y o  female  Chief Complaint   Patient presents with    Hypertension     discuss change of medication, headches, congestion, feels heart beat        21year old female is seen today with concern for symptoms that she feels may be related to her BP  Her symptoms consist of frontal and occipital headaches and tachycardia  She has been taking taking Excedrin Migraine for her headaches, which resolves her headaches on occasion  She has been experiencing the headaches 3-4 times a week since 2-3 months  Has been experiencing acute sinusitis since 1 week    She has been taken an over-the-counter decongestant    Regarding hypertension, she has been compliant with methyldopa 500 mg daily  She was informed by her pharmacy that methyldopa lid will no longer be may factor  She has a new prescription she just picked up of methyldopa 500 mg  Hypertension  This is a chronic problem  The current episode started more than 1 year ago  The problem is uncontrolled  Pertinent negatives include no chest pain, headaches, palpitations or shortness of breath  Past treatments include central alpha agonists  Sinusitis  This is a chronic problem  The current episode started more than 1 year ago  The problem is unchanged  There has been no fever  Associated symptoms include coughing (nonproductive ), sinus pressure and a sore throat  Pertinent negatives include no chills, congestion, diaphoresis, headaches, shortness of breath or sneezing  Past treatments include oral decongestants  The treatment provided mild relief  The following portions of the patient's history were reviewed and updated as appropriate: allergies, current medications, past family history, past medical history, past social history, past surgical history and problem list     Review of Systems   Constitutional: Negative for activity change, appetite change, chills, diaphoresis, fatigue and fever  HENT: Positive for sinus pressure and sore throat  Negative for congestion, postnasal drip, rhinorrhea, sinus pain and sneezing  Eyes: Negative for visual disturbance  Respiratory: Positive for cough (nonproductive )  Negative for apnea, choking, chest tightness, shortness of breath and wheezing  Cardiovascular: Negative for chest pain, palpitations and leg swelling  Gastrointestinal: Negative for abdominal distention, abdominal pain, anal bleeding, blood in stool, constipation, diarrhea, nausea and vomiting  Endocrine: Negative for cold intolerance and heat intolerance  Genitourinary: Negative for difficulty urinating, dysuria and hematuria     Musculoskeletal: Negative  Skin: Negative  Neurological: Negative for dizziness, weakness, light-headedness, numbness and headaches  Hematological: Negative for adenopathy  Psychiatric/Behavioral: Negative for agitation, sleep disturbance and suicidal ideas  All other systems reviewed and are negative          Past Medical History:   Diagnosis Date    Acne     resolved-12/3/2015    Eczema     last assessed-12/3/2015    Gastroesophageal reflux disease without esophagitis 6/28/2019    Hypertension     last assessed-12/11/2017    Iron deficiency anemia 5/21/2019    Pneumonia 11/4/2019    Subluxation of left patella     last assessed-3/31/2015         Current Outpatient Medications:     Calcium Carbonate-Vitamin D (CALCIUM 600+D) 600-200 MG-UNIT TABS, Take 1 tablet by mouth daily, Disp: , Rfl:     clindamycin (CLEOCIN T) 1 % lotion, Apply 1 application topically 2 (two) times a day as needed (ezema), Disp: 60 mL, Rfl: 1    DAILY MULTIPLE VITAMINS PO, Take 1 tablet by mouth daily , Disp: , Rfl:     ergocalciferol (VITAMIN D2) 50,000 units, Take 1 capsule (50,000 Units total) by mouth every 14 (fourteen) days, Disp: 12 capsule, Rfl: 1    fluticasone (FLONASE) 50 mcg/act nasal spray, 2 sprays into each nostril daily, Disp: , Rfl:     IRON, FERROUS GLUCONATE, PO, Take 1 tablet by mouth every other day, Disp: , Rfl:     levonorgestrel (KYLEENA) 19 5 MG intrauterine device, 1 Intra Uterine Device by Intrauterine route once, Disp: , Rfl:     methyldopa (ALDOMET) 250 mg tablet, Take 1 tablet (250 mg total) by mouth 2 (two) times a day, Disp: 180 tablet, Rfl: 1    Probiotic Product (DIGESTIVE ADVANTAGE PO), Take 1 capsule by mouth every other day, Disp: , Rfl:     Allergies   Allergen Reactions    Cefdinir Hives       Social History   Past Surgical History:   Procedure Laterality Date    TONSILLECTOMY      WISDOM TOOTH EXTRACTION  03/2020     Family History   Problem Relation Age of Onset    Allergic rhinitis Mother     Hypertension Mother     Diabetes Mother     Cancer Other     Diabetes Other     Thyroid disease Maternal Grandmother        Objective:  /100 (BP Location: Left arm, Patient Position: Sitting, Cuff Size: Large)   Pulse 94   Temp 98 °F (36 7 °C)   Ht 5' 2" (1 575 m)   Wt 129 kg (284 lb)   SpO2 98%   BMI 51 94 kg/m²     No results found for this or any previous visit (from the past 1344 hour(s))  Physical Exam  Vitals signs and nursing note reviewed  Constitutional:       General: She is not in acute distress  Appearance: She is well-developed  She is not diaphoretic  HENT:      Head: Normocephalic and atraumatic  Eyes:      General:         Right eye: No discharge  Left eye: No discharge  Conjunctiva/sclera: Conjunctivae normal       Pupils: Pupils are equal, round, and reactive to light  Neck:      Musculoskeletal: Normal range of motion and neck supple  Thyroid: No thyromegaly  Vascular: No JVD  Cardiovascular:      Rate and Rhythm: Normal rate and regular rhythm  Heart sounds: Normal heart sounds  No murmur  No friction rub  No gallop  Pulmonary:      Effort: Pulmonary effort is normal  No respiratory distress  Breath sounds: Normal breath sounds  No wheezing or rales  Chest:      Chest wall: No tenderness  Abdominal:      General: There is no distension  Palpations: Abdomen is soft  Tenderness: There is no abdominal tenderness  Musculoskeletal: Normal range of motion  General: No tenderness or deformity  Lymphadenopathy:      Cervical: No cervical adenopathy  Skin:     General: Skin is warm and dry  Coloration: Skin is not pale  Findings: No erythema or rash  Neurological:      Mental Status: She is alert and oriented to person, place, and time  Cranial Nerves: No cranial nerve deficit        Coordination: Coordination normal    Psychiatric:         Behavior: Behavior normal  Thought Content:  Thought content normal          Judgment: Judgment normal

## 2021-01-18 NOTE — ASSESSMENT & PLAN NOTE
Elevated today however she is acutely ill with a sinus infection and took an over-the-counter decongestant which may have ephedrine/pseudoephedrine  I advised she ensure that decongestant she is taking not have these components and for her to continue to check her blood pressure daily and contact office in 1 -2 weeks with blood pressure readings  For now, continue methyldopa 500 mg daily

## 2021-01-25 ENCOUNTER — OFFICE VISIT (OUTPATIENT)
Dept: OBGYN CLINIC | Facility: MEDICAL CENTER | Age: 21
End: 2021-01-25
Payer: COMMERCIAL

## 2021-01-25 VITALS — WEIGHT: 284 LBS | DIASTOLIC BLOOD PRESSURE: 80 MMHG | BODY MASS INDEX: 51.94 KG/M2 | SYSTOLIC BLOOD PRESSURE: 136 MMHG

## 2021-01-25 DIAGNOSIS — Z11.3 SCREEN FOR STD (SEXUALLY TRANSMITTED DISEASE): Primary | ICD-10-CM

## 2021-01-25 DIAGNOSIS — R10.2 PELVIC PAIN: ICD-10-CM

## 2021-01-25 PROCEDURE — 87491 CHLMYD TRACH DNA AMP PROBE: CPT | Performed by: PHYSICIAN ASSISTANT

## 2021-01-25 PROCEDURE — 99213 OFFICE O/P EST LOW 20 MIN: CPT | Performed by: PHYSICIAN ASSISTANT

## 2021-01-25 PROCEDURE — 87591 N.GONORRHOEAE DNA AMP PROB: CPT | Performed by: PHYSICIAN ASSISTANT

## 2021-01-25 NOTE — PROGRESS NOTES
Nazia Mcpherson  2000    S:  21 y o  female here for a problem visit  She has had a Dmitri Carreon IUD in place since 8/21/19  She was getting 8 days of bleeding for her cycle with this, as opposed to 7 days prior to her IUD  This did not bother her    She had intercourse a month ago and had a sharp, quick pain  She has been sexually active since and Sueadonovan Carrasquillois has not recurred      She started with vaginal bleeding 5 days ago with her normal period; this is heavier and crampier than usual  She is saturating a tampon every 4-5 hours  She is with her current partner of 8 months and uses condoms as well       Past Medical History:   Diagnosis Date    Acne     resolved-12/3/2015    Eczema     last assessed-12/3/2015    Gastroesophageal reflux disease without esophagitis 6/28/2019    Hypertension     last assessed-12/11/2017    Iron deficiency anemia 5/21/2019    Pneumonia 11/4/2019    Subluxation of left patella     last assessed-3/31/2015     Family History   Problem Relation Age of Onset    Allergic rhinitis Mother     Hypertension Mother     Diabetes Mother     Cancer Other     Diabetes Other     Thyroid disease Maternal Grandmother      Social History     Socioeconomic History    Marital status: Single     Spouse name: None    Number of children: None    Years of education: None    Highest education level: None   Occupational History    None   Social Needs    Financial resource strain: None    Food insecurity     Worry: None     Inability: None    Transportation needs     Medical: None     Non-medical: None   Tobacco Use    Smoking status: Never Smoker    Smokeless tobacco: Never Used   Substance and Sexual Activity    Alcohol use: Yes     Frequency: 2-4 times a month     Drinks per session: 1 or 2     Binge frequency: Never    Drug use: No    Sexual activity: Yes     Partners: Male     Birth control/protection: I U D , Condom Male   Lifestyle    Physical activity     Days per week: None Minutes per session: None    Stress: None   Relationships    Social connections     Talks on phone: None     Gets together: None     Attends Confucianism service: None     Active member of club or organization: None     Attends meetings of clubs or organizations: None     Relationship status: None    Intimate partner violence     Fear of current or ex partner: None     Emotionally abused: None     Physically abused: None     Forced sexual activity: None   Other Topics Concern    None   Social History Narrative    None       Review of Systems   Respiratory: Negative  Cardiovascular: Negative  Gastrointestinal: Negative for constipation and diarrhea  Genitourinary: Negative for difficulty urinating, pelvic pain, vaginal bleeding, vaginal discharge, itching or odor  O:  /80 (BP Location: Right arm, Patient Position: Sitting, Cuff Size: Standard)   Wt 129 kg (284 lb)   LMP 01/21/2021 (Exact Date)   BMI 51 94 kg/m²   She appears well and is in no distress  Abdomen is soft and nontender  External genitals are normal without lesions or rashes  Vagina has scant brown discharge  Cervix is normal, no lesions or discharge  IUD strings are normal  Uterus is nontender, no masses  Adnexa are nontender, no pelvic masses appreciated    A/P: Menorrhagia  Check GC/chlamydia  Observe  If recurrent, will check TSH, pelvic ultrasound

## 2021-01-27 DIAGNOSIS — Z11.52 ENCOUNTER FOR SCREENING FOR COVID-19: ICD-10-CM

## 2021-01-27 LAB — SARS-COV-2 RNA RESP QL NAA+PROBE: NEGATIVE

## 2021-01-27 PROCEDURE — 87635 SARS-COV-2 COVID-19 AMP PRB: CPT | Performed by: INTERNAL MEDICINE

## 2021-01-28 LAB
C TRACH DNA SPEC QL NAA+PROBE: NEGATIVE
N GONORRHOEA DNA SPEC QL NAA+PROBE: NEGATIVE

## 2021-03-09 ENCOUNTER — TELEPHONE (OUTPATIENT)
Dept: INTERNAL MEDICINE CLINIC | Facility: CLINIC | Age: 21
End: 2021-03-09

## 2021-03-09 DIAGNOSIS — I10 ESSENTIAL HYPERTENSION: Primary | ICD-10-CM

## 2021-03-09 NOTE — TELEPHONE ENCOUNTER
Patient in need of a new script for replacement of methylopa prescription (medication is discontinued) to be sent to [facility]   Patient has 4 left

## 2021-03-09 NOTE — TELEPHONE ENCOUNTER
Call patient for clarification  Patient stated Methyldopa medication is no longer going to be manufactured and would like an alternative blood pressure medication prescribed  Pt stated has 4 days left of her current script     Please send alternative to [facility] mail order pharm

## 2021-03-10 DIAGNOSIS — Z23 ENCOUNTER FOR IMMUNIZATION: ICD-10-CM

## 2021-03-10 RX ORDER — AMLODIPINE BESYLATE 5 MG/1
5 TABLET ORAL DAILY
Qty: 90 TABLET | Refills: 0 | Status: SHIPPED | OUTPATIENT
Start: 2021-03-10 | End: 2021-05-27 | Stop reason: SDUPTHER

## 2021-03-10 NOTE — TELEPHONE ENCOUNTER
Please contact patient to inform her that in replace of methyldopa I will start amlodipine 5 mg daily  She should continue to monitor her blood pressure while starting amlodipine to ensure good blood pressure controlled

## 2021-03-17 ENCOUNTER — IMMUNIZATIONS (OUTPATIENT)
Dept: FAMILY MEDICINE CLINIC | Facility: HOSPITAL | Age: 21
End: 2021-03-17

## 2021-03-17 DIAGNOSIS — Z23 ENCOUNTER FOR IMMUNIZATION: Primary | ICD-10-CM

## 2021-03-17 PROCEDURE — 0011A SARS-COV-2 / COVID-19 MRNA VACCINE (MODERNA) 100 MCG: CPT

## 2021-03-17 PROCEDURE — 91301 SARS-COV-2 / COVID-19 MRNA VACCINE (MODERNA) 100 MCG: CPT

## 2021-04-16 ENCOUNTER — IMMUNIZATIONS (OUTPATIENT)
Dept: FAMILY MEDICINE CLINIC | Facility: HOSPITAL | Age: 21
End: 2021-04-16

## 2021-04-16 DIAGNOSIS — Z23 ENCOUNTER FOR IMMUNIZATION: Primary | ICD-10-CM

## 2021-04-16 PROCEDURE — 0012A SARS-COV-2 / COVID-19 MRNA VACCINE (MODERNA) 100 MCG: CPT

## 2021-04-16 PROCEDURE — 91301 SARS-COV-2 / COVID-19 MRNA VACCINE (MODERNA) 100 MCG: CPT

## 2021-04-21 ENCOUNTER — APPOINTMENT (OUTPATIENT)
Dept: RADIOLOGY | Age: 21
End: 2021-04-21
Payer: COMMERCIAL

## 2021-04-21 ENCOUNTER — APPOINTMENT (OUTPATIENT)
Dept: LAB | Age: 21
End: 2021-04-21
Payer: COMMERCIAL

## 2021-04-21 ENCOUNTER — OFFICE VISIT (OUTPATIENT)
Dept: INTERNAL MEDICINE CLINIC | Facility: CLINIC | Age: 21
End: 2021-04-21
Payer: COMMERCIAL

## 2021-04-21 VITALS
BODY MASS INDEX: 47.91 KG/M2 | WEIGHT: 280.6 LBS | SYSTOLIC BLOOD PRESSURE: 142 MMHG | DIASTOLIC BLOOD PRESSURE: 86 MMHG | OXYGEN SATURATION: 100 % | HEART RATE: 78 BPM | HEIGHT: 64 IN | TEMPERATURE: 99.7 F

## 2021-04-21 DIAGNOSIS — E55.9 VITAMIN D DEFICIENCY: ICD-10-CM

## 2021-04-21 DIAGNOSIS — D50.8 IRON DEFICIENCY ANEMIA SECONDARY TO INADEQUATE DIETARY IRON INTAKE: ICD-10-CM

## 2021-04-21 DIAGNOSIS — I10 ESSENTIAL HYPERTENSION: ICD-10-CM

## 2021-04-21 DIAGNOSIS — I10 ESSENTIAL HYPERTENSION: Primary | ICD-10-CM

## 2021-04-21 DIAGNOSIS — R06.02 SOB (SHORTNESS OF BREATH): ICD-10-CM

## 2021-04-21 DIAGNOSIS — Z13.220 SCREENING CHOLESTEROL LEVEL: ICD-10-CM

## 2021-04-21 PROBLEM — J01.90 ACUTE SINUSITIS: Status: RESOLVED | Noted: 2021-01-18 | Resolved: 2021-04-21

## 2021-04-21 LAB
25(OH)D3 SERPL-MCNC: 37.1 NG/ML (ref 30–100)
ALBUMIN SERPL BCP-MCNC: 4.1 G/DL (ref 3.5–5)
ALP SERPL-CCNC: 58 U/L (ref 46–116)
ALT SERPL W P-5'-P-CCNC: 45 U/L (ref 12–78)
ANION GAP SERPL CALCULATED.3IONS-SCNC: 6 MMOL/L (ref 4–13)
AST SERPL W P-5'-P-CCNC: 32 U/L (ref 5–45)
BASOPHILS # BLD AUTO: 0.04 THOUSANDS/ΜL (ref 0–0.1)
BASOPHILS NFR BLD AUTO: 1 % (ref 0–1)
BILIRUB SERPL-MCNC: 0.53 MG/DL (ref 0.2–1)
BUN SERPL-MCNC: 13 MG/DL (ref 5–25)
CALCIUM SERPL-MCNC: 9.3 MG/DL (ref 8.3–10.1)
CHLORIDE SERPL-SCNC: 106 MMOL/L (ref 100–108)
CHOLEST SERPL-MCNC: 158 MG/DL (ref 50–200)
CO2 SERPL-SCNC: 26 MMOL/L (ref 21–32)
CREAT SERPL-MCNC: 0.54 MG/DL (ref 0.6–1.3)
EOSINOPHIL # BLD AUTO: 0.11 THOUSAND/ΜL (ref 0–0.61)
EOSINOPHIL NFR BLD AUTO: 2 % (ref 0–6)
ERYTHROCYTE [DISTWIDTH] IN BLOOD BY AUTOMATED COUNT: 12 % (ref 11.6–15.1)
FERRITIN SERPL-MCNC: 79 NG/ML (ref 8–388)
GFR SERPL CREATININE-BSD FRML MDRD: 135 ML/MIN/1.73SQ M
GLUCOSE P FAST SERPL-MCNC: 69 MG/DL (ref 65–99)
HCT VFR BLD AUTO: 41.6 % (ref 34.8–46.1)
HDLC SERPL-MCNC: 61 MG/DL
HGB BLD-MCNC: 13.4 G/DL (ref 11.5–15.4)
IMM GRANULOCYTES # BLD AUTO: 0.02 THOUSAND/UL (ref 0–0.2)
IMM GRANULOCYTES NFR BLD AUTO: 0 % (ref 0–2)
LDLC SERPL CALC-MCNC: 78 MG/DL (ref 0–100)
LYMPHOCYTES # BLD AUTO: 2.17 THOUSANDS/ΜL (ref 0.6–4.47)
LYMPHOCYTES NFR BLD AUTO: 29 % (ref 14–44)
MCH RBC QN AUTO: 30 PG (ref 26.8–34.3)
MCHC RBC AUTO-ENTMCNC: 32.2 G/DL (ref 31.4–37.4)
MCV RBC AUTO: 93 FL (ref 82–98)
MONOCYTES # BLD AUTO: 0.41 THOUSAND/ΜL (ref 0.17–1.22)
MONOCYTES NFR BLD AUTO: 5 % (ref 4–12)
NEUTROPHILS # BLD AUTO: 4.83 THOUSANDS/ΜL (ref 1.85–7.62)
NEUTS SEG NFR BLD AUTO: 63 % (ref 43–75)
NONHDLC SERPL-MCNC: 97 MG/DL
NRBC BLD AUTO-RTO: 0 /100 WBCS
PLATELET # BLD AUTO: 380 THOUSANDS/UL (ref 149–390)
PMV BLD AUTO: 9.9 FL (ref 8.9–12.7)
POTASSIUM SERPL-SCNC: 3.9 MMOL/L (ref 3.5–5.3)
PROT SERPL-MCNC: 8.1 G/DL (ref 6.4–8.2)
RBC # BLD AUTO: 4.46 MILLION/UL (ref 3.81–5.12)
SODIUM SERPL-SCNC: 138 MMOL/L (ref 136–145)
TRIGL SERPL-MCNC: 93 MG/DL
TSH SERPL DL<=0.05 MIU/L-ACNC: 1.66 UIU/ML (ref 0.36–3.74)
WBC # BLD AUTO: 7.58 THOUSAND/UL (ref 4.31–10.16)

## 2021-04-21 PROCEDURE — 82728 ASSAY OF FERRITIN: CPT

## 2021-04-21 PROCEDURE — 84443 ASSAY THYROID STIM HORMONE: CPT

## 2021-04-21 PROCEDURE — 80053 COMPREHEN METABOLIC PANEL: CPT

## 2021-04-21 PROCEDURE — 36415 COLL VENOUS BLD VENIPUNCTURE: CPT

## 2021-04-21 PROCEDURE — 99214 OFFICE O/P EST MOD 30 MIN: CPT | Performed by: FAMILY MEDICINE

## 2021-04-21 PROCEDURE — 71046 X-RAY EXAM CHEST 2 VIEWS: CPT

## 2021-04-21 PROCEDURE — 85025 COMPLETE CBC W/AUTO DIFF WBC: CPT

## 2021-04-21 PROCEDURE — 80061 LIPID PANEL: CPT

## 2021-04-21 PROCEDURE — 82306 VITAMIN D 25 HYDROXY: CPT

## 2021-04-21 RX ORDER — HYDROCHLOROTHIAZIDE 12.5 MG/1
12.5 TABLET ORAL DAILY
Qty: 90 TABLET | Refills: 0 | Status: SHIPPED | OUTPATIENT
Start: 2021-04-21 | End: 2021-07-15 | Stop reason: SDUPTHER

## 2021-04-21 NOTE — PROGRESS NOTES
Assessment/Plan:    1  Essential hypertension  -     Comprehensive metabolic panel; Future  -     TSH, 3rd generation; Future  -     hydrochlorothiazide (HYDRODIURIL) 12 5 mg tablet; Take 1 tablet (12 5 mg total) by mouth daily  -     Echo complete with contrast if indicated; Future; Expected date: 04/21/2021    2  Iron deficiency anemia secondary to inadequate dietary iron intake  -     CBC and differential; Future  -     Ferritin; Future  -     TSH, 3rd generation; Future    3  Vitamin D deficiency  -     Vitamin D 25 hydroxy; Future  -     TSH, 3rd generation; Future    4  SOB (shortness of breath)  -     XR chest pa & lateral; Future; Expected date: 04/21/2021  -     TSH, 3rd generation; Future  -     Echo complete with contrast if indicated; Future; Expected date: 04/21/2021    5  Screening cholesterol level  -     Lipid panel; Future      BMI Counseling: Body mass index is 48 5 kg/m²  The BMI is above normal  Nutrition recommendations include moderation in carbohydrate intake  Exercise recommendations include exercising 3-5 times per week  No pharmacotherapy was ordered  Add hydrochlorothiazide and check blood pressures at home  E-mail me results in approximately 2 weeks and follow up oncology's over in 1 month  Check chest x-ray lab work and echocardiogram            There are no Patient Instructions on file for this visit  Return in about 4 months (around 8/21/2021) for Recheck  Subjective:      Patient ID: Khoa Torrez is a 24 y o  female  Chief Complaint   Patient presents with    Follow-up     pt c/o congestion & sob after activity, Would like to see cardio again  HPI     Shortness of breath, this is a new occurrence  A few days ago she was practicing her dance and she ended up with shortness of breath that took her 10 minutes to recover    She states her routine was not out of the ordinary and she is very active in working and is a  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  She states her activity was not out of the ordinary but the recovery for shortness of breath was and she got worried  She has never had this before  She has also been having intermittent palpitations which she describes as a quick few 2nd racing of the heart and then 8 is normal   As a child when she was diagnosed with hypertension she was seeing a pediatric cardiologist and she was having echocardiograms done every 2 years  She denies any chest pain any shortness of breath the rest any difficulty with doing steps or activities of daily living    Vitamin-D deficiency, last check March 2018, level is 24  Taking over-the-counter vitamin-D daily   July 2019, taking weekly vitamin-D and tolerating well   January 2020, levels of finally improved to 37 8   Taking vitamin-D as directed  [facility]  August 2020, taking weekly vitamin-D without any issues  Had recent lab work done  April 2021, takes 96889 International Units every 2 weeks  She is due for lab work      Hypertension, used to follow up with a pediatric cardiologist for hypertension but recently is now out of the age range  Chris Gee to discuss changing medications due to risk of fetal side effects  Sai Vaughan is not currently sexually active and has no plans of conceiving but would like to stay on the safe side  Pilar Arellano blood pressures have been well controlled and she was diagnosed at the age of 6  She does not know the details of that and old records are not available from [facility]  Sai Vaughan does not check her blood pressure at home but occasionally does when she goes to the store and is well controlled  July 2019, was seen for a routine physical in June and blood pressure was elevated   At that time methyldopa was increased from b i d  To t i d  However she states that she often forgets the middle dose and has been routinely taking it b i d     Blood pressure today is is well controlled   She states home blood pressures are well controlled   Tolerating it well January 2020, compliant with medication and tolerating it well   Home blood pressure readings are not checked   Is 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  August 2020, has been working 3 jobs and will soon start her 27 year in college  Lost 10 lb but is not quite sure how  She remains very active and is taking her medications and is compliant  April 2021, blood pressure medications were changed from methyldopa to amlodipine about 1 month ago  She has been tracking her blood pressures which have been okay but occasionally she has elevated diastolic blood pressures  She is tolerating the medication without any problems and has not gained weight  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  She also works in [employer] as a  so she is always moving  She states she does not have a high salt diet but eats lunch me almost every day and sometimes uses the packaged sausages      Iron deficiency anemia, taking iron and a multivitamin  She is not sure if her multivitamin has iron  Had lab work done and his hemoglobin is stable  April 2021, last lab work was done July 2020 which was in range    She takes a multivitamin with iron        The following portions of the patient's history were reviewed and updated as appropriate: allergies, current medications, past family history, past medical history, past social history, past surgical history and problem list     Review of Systems      Constitutional:  Denies fever or chills   Eyes:  Denies double or blurry vision  HENT:  Denies nasal congestion or sore throat   Respiratory:  Denies cough or shortness of breath or wheezing  Cardiovascular:  Denies palpitations or chest pain  GI:  Denies abdominal pain, nausea, or vomiting, no loose stools  Integument:  Denies rash , no open areas  Neurologic:  Denies headache or focal weakness, no dizziness        Current Outpatient Medications   Medication Sig Dispense Refill    amLODIPine (NORVASC) 5 mg tablet Take 1 tablet (5 mg total) by mouth daily 90 tablet 0    Calcium Carbonate-Vitamin D (CALCIUM 600+D) 600-200 MG-UNIT TABS Take 1 tablet by mouth daily      clindamycin (CLEOCIN T) 1 % lotion Apply 1 application topically 2 (two) times a day as needed (ezema) 60 mL 1    DAILY MULTIPLE VITAMINS PO Take 1 tablet by mouth daily       ergocalciferol (VITAMIN D2) 50,000 units Take 1 capsule (50,000 Units total) by mouth every 14 (fourteen) days 12 capsule 1    fluticasone (FLONASE) 50 mcg/act nasal spray 2 sprays into each nostril daily      IRON, FERROUS GLUCONATE, PO Take 1 tablet by mouth every other day      levonorgestrel (KYLEENA) 19 5 MG intrauterine device 1 Intra Uterine Device by Intrauterine route once      Probiotic Product (DIGESTIVE ADVANTAGE PO) Take 1 capsule by mouth every other day      hydrochlorothiazide (HYDRODIURIL) 12 5 mg tablet Take 1 tablet (12 5 mg total) by mouth daily 90 tablet 0     No current facility-administered medications for this visit          Objective:    /86 (BP Location: Left arm, Patient Position: Sitting, Cuff Size: Large)   Pulse 78   Temp 99 7 °F (37 6 °C) (Temporal)   Ht 5' 3 78" (1 62 m)   Wt 127 kg (280 lb 9 6 oz)   SpO2 100% Comment: ra  BMI 48 50 kg/m²        Physical Exam       Constitutional:  Well developed, well nourished, no acute distress, non-toxic appearance   Eyes:  PERRL, conjunctiva normal , non icteric sclera  HENT:  Atraumatic, oropharynx moist  Neck-  supple   Respiratory:  CTA b/l, normal breath sounds, no rales, no wheezing   Cardiovascular:  RRR, no murmurs, no LE edema b/l  GI:  Soft, nondistended, normal bowel sounds x 4, nontender, no organomegaly, no mass, no rebound, no guarding   Neurologic:  no focal deficits noted   Psychiatric:  Speech and behavior appropriate , AAO x 3        Lindsey Rodriguez DO

## 2021-05-21 ENCOUNTER — HOSPITAL ENCOUNTER (OUTPATIENT)
Dept: NON INVASIVE DIAGNOSTICS | Facility: CLINIC | Age: 21
Discharge: HOME | End: 2021-05-21
Payer: COMMERCIAL

## 2021-05-21 DIAGNOSIS — I10 ESSENTIAL HYPERTENSION: ICD-10-CM

## 2021-05-21 DIAGNOSIS — R06.02 SOB (SHORTNESS OF BREATH): ICD-10-CM

## 2021-05-21 PROCEDURE — 93306 TTE W/DOPPLER COMPLETE: CPT

## 2021-05-21 PROCEDURE — 93306 TTE W/DOPPLER COMPLETE: CPT | Performed by: INTERNAL MEDICINE

## 2021-05-27 ENCOUNTER — OFFICE VISIT (OUTPATIENT)
Dept: INTERNAL MEDICINE CLINIC | Age: 21
End: 2021-05-27
Payer: COMMERCIAL

## 2021-05-27 VITALS
OXYGEN SATURATION: 98 % | HEIGHT: 64 IN | SYSTOLIC BLOOD PRESSURE: 122 MMHG | DIASTOLIC BLOOD PRESSURE: 70 MMHG | BODY MASS INDEX: 48.4 KG/M2 | WEIGHT: 283.5 LBS | TEMPERATURE: 98.7 F | HEART RATE: 70 BPM

## 2021-05-27 DIAGNOSIS — E55.9 VITAMIN D DEFICIENCY: ICD-10-CM

## 2021-05-27 DIAGNOSIS — I10 ESSENTIAL HYPERTENSION: Primary | ICD-10-CM

## 2021-05-27 PROBLEM — D50.9 IRON DEFICIENCY ANEMIA: Status: RESOLVED | Noted: 2019-05-21 | Resolved: 2021-05-27

## 2021-05-27 PROCEDURE — 99213 OFFICE O/P EST LOW 20 MIN: CPT | Performed by: FAMILY MEDICINE

## 2021-05-27 RX ORDER — AMLODIPINE BESYLATE 5 MG/1
5 TABLET ORAL DAILY
Qty: 90 TABLET | Refills: 1 | Status: SHIPPED | OUTPATIENT
Start: 2021-05-27 | End: 2022-01-18 | Stop reason: SDUPTHER

## 2021-05-27 NOTE — PROGRESS NOTES
Assessment/Plan:    1  Essential hypertension  -     amLODIPine (NORVASC) 5 mg tablet; Take 1 tablet (5 mg total) by mouth daily    2  Vitamin D deficiency    Laboratory data reviewed  No changes to vitamin-D that is twice a week  Continue with current blood pressure medications and advised her to bring in the machine for next appointment in 3 months  At that time we will order lab work for later on  She understands and agrees  Chest x-ray negative as well as echocardiogram         There are no Patient Instructions on file for this visit  Return in about 3 months (around 8/27/2021) for Recheck  Subjective:      Patient ID: Roma Lerma is a 24 y o  female  Chief Complaint   Patient presents with    Follow-up    Hypertension       HPI  Hypertension, used to follow up with a pediatric cardiologist for hypertension but recently is now out of the age range  Elvis Tovar to discuss changing medications due to risk of fetal side effects  Giovana Archer is not currently sexually active and has no plans of conceiving but would like to stay on the safe side  Alice Lindsay blood pressures have been well controlled and she was diagnosed at the age of 6  She does not know the details of that and old records are not available from [facility]  Giovana Archer does not check her blood pressure at home but occasionally does when she goes to the store and is well controlled  July 2019, was seen for a routine physical in June and blood pressure was elevated   At that time methyldopa was increased from b i d  To t i d  However she states that she often forgets the middle dose and has been routinely taking it b i d     Blood pressure today is is well controlled   She states home blood pressures are well controlled   Tolerating it well January 2020, compliant with medication and tolerating it well   Home blood pressure readings are not checked   Is 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  August 2020, has been working 3 jobs and will soon start her 30 year in college   Lost 10 lb but is not quite sure how  Pelon Morales remains very active and is taking her medications and is compliant  April 2021, blood pressure medications were changed from methyldopa to amlodipine about 1 month ago  She has been tracking her blood pressures which have been okay but occasionally she has elevated diastolic blood pressures  She is tolerating the medication without any problems and has not gained weight  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  She also works in [employer] as a  so she is always moving  She states she does not have a high salt diet but eats lunch me almost every day and sometimes uses the packaged sausages  May 2021, new medication hydrochlorothiazide started 6 weeks ago  Significant improvement in blood pressure however home readings still elevated but better in general   She did not bring in her blood pressure machine today to have it checked  She feels well and has no side effects with the new medication    She is taking hydrochlorothiazide and 5 mg of amlodipine daily      The following portions of the patient's history were reviewed and updated as appropriate: allergies, current medications, past family history, past medical history, past social history, past surgical history and problem list     Review of Systems        Constitutional:  Denies fever or chills   Eyes:  Denies double or blurry vision  HENT:  Denies nasal congestion or sore throat   Respiratory:  Denies cough or shortness of breath or wheezing  Cardiovascular:  Denies palpitations or chest pain  GI:  Denies abdominal pain, nausea, or vomiting, no loose stools  Integument:  Denies rash , no open areas  Neurologic:  Denies headache or focal weakness, no dizziness      Current Outpatient Medications   Medication Sig Dispense Refill    amLODIPine (NORVASC) 5 mg tablet Take 1 tablet (5 mg total) by mouth daily 90 tablet 1    Biotin w/ Vitamins C & E (HAIR/SKIN/NAILS PO) Take by mouth      Calcium Carbonate-Vitamin D (CALCIUM 600+D) 600-200 MG-UNIT TABS Take 1 tablet by mouth daily      clindamycin (CLEOCIN T) 1 % lotion Apply 1 application topically 2 (two) times a day as needed (ezema) 60 mL 1    DAILY MULTIPLE VITAMINS PO Take 1 tablet by mouth daily       ergocalciferol (VITAMIN D2) 50,000 units Take 1 capsule (50,000 Units total) by mouth every 14 (fourteen) days 12 capsule 1    fluticasone (FLONASE) 50 mcg/act nasal spray 2 sprays into each nostril daily      hydrochlorothiazide (HYDRODIURIL) 12 5 mg tablet Take 1 tablet (12 5 mg total) by mouth daily 90 tablet 0    IRON, FERROUS GLUCONATE, PO Take 1 tablet by mouth every other day      levonorgestrel (KYLEENA) 19 5 MG intrauterine device 1 Intra Uterine Device by Intrauterine route once      Probiotic Product (DIGESTIVE ADVANTAGE PO) Take 1 capsule by mouth every other day       No current facility-administered medications for this visit          Objective:    /70 (BP Location: Left arm, Patient Position: Sitting, Cuff Size: Large)   Pulse 70   Temp 98 7 °F (37 1 °C) (Temporal)   Ht 5' 3 78" (1 62 m)   Wt 129 kg (283 lb 8 oz)   SpO2 98%   BMI 49 00 kg/m²        Physical Exam    Constitutional:  Well developed, well nourished, no acute distress, non-toxic appearance   Eyes:  PERRL, conjunctiva normal , non icteric sclera  HENT:  Atraumatic, oropharynx moist  Neck-  supple   Respiratory:  CTA b/l, normal breath sounds, no rales, no wheezing   Cardiovascular:  RRR, no murmurs, no LE edema b/l  GI:  Soft, nondistended, normal bowel sounds x 4, nontender, no organomegaly, no mass, no rebound, no guarding   Neurologic:  no focal deficits noted   Psychiatric:  Speech and behavior appropriate , AAO x 3           Adriana Garza DO

## 2021-07-15 DIAGNOSIS — I10 ESSENTIAL HYPERTENSION: ICD-10-CM

## 2021-07-15 RX ORDER — HYDROCHLOROTHIAZIDE 12.5 MG/1
12.5 TABLET ORAL DAILY
Qty: 90 TABLET | Refills: 1 | Status: SHIPPED | OUTPATIENT
Start: 2021-07-15 | End: 2022-03-10 | Stop reason: SDUPTHER

## 2021-07-20 DIAGNOSIS — E55.9 VITAMIN D DEFICIENCY: ICD-10-CM

## 2021-07-20 RX ORDER — ERGOCALCIFEROL 1.25 MG/1
CAPSULE ORAL
Qty: 6 CAPSULE | Refills: 0 | Status: SHIPPED | OUTPATIENT
Start: 2021-07-20 | End: 2021-09-28 | Stop reason: SDUPTHER

## 2021-08-09 ENCOUNTER — OFFICE VISIT (OUTPATIENT)
Dept: INTERNAL MEDICINE CLINIC | Age: 21
End: 2021-08-09
Payer: COMMERCIAL

## 2021-08-09 VITALS
WEIGHT: 286.3 LBS | BODY MASS INDEX: 48.88 KG/M2 | HEART RATE: 79 BPM | OXYGEN SATURATION: 98 % | HEIGHT: 64 IN | DIASTOLIC BLOOD PRESSURE: 70 MMHG | TEMPERATURE: 98.2 F | SYSTOLIC BLOOD PRESSURE: 112 MMHG

## 2021-08-09 DIAGNOSIS — E55.9 VITAMIN D DEFICIENCY: Primary | ICD-10-CM

## 2021-08-09 DIAGNOSIS — D50.8 IRON DEFICIENCY ANEMIA SECONDARY TO INADEQUATE DIETARY IRON INTAKE: ICD-10-CM

## 2021-08-09 DIAGNOSIS — Z23 NEED FOR TDAP VACCINATION: ICD-10-CM

## 2021-08-09 DIAGNOSIS — Z13.220 SCREENING CHOLESTEROL LEVEL: ICD-10-CM

## 2021-08-09 DIAGNOSIS — I10 ESSENTIAL HYPERTENSION: ICD-10-CM

## 2021-08-09 DIAGNOSIS — Z11.59 ENCOUNTER FOR HEPATITIS C SCREENING TEST FOR LOW RISK PATIENT: ICD-10-CM

## 2021-08-09 DIAGNOSIS — E66.9 OBESITY WITHOUT SERIOUS COMORBIDITY WITH BODY MASS INDEX (BMI) IN 99TH PERCENTILE FOR AGE IN PEDIATRIC PATIENT, UNSPECIFIED OBESITY TYPE: ICD-10-CM

## 2021-08-09 PROCEDURE — 99214 OFFICE O/P EST MOD 30 MIN: CPT | Performed by: FAMILY MEDICINE

## 2021-08-09 PROCEDURE — 90471 IMMUNIZATION ADMIN: CPT

## 2021-08-09 PROCEDURE — 90715 TDAP VACCINE 7 YRS/> IM: CPT

## 2021-08-09 NOTE — PROGRESS NOTES
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 patient, unspecified obesity type    4  BMI 45 0-49 9, adult ([address] )    5  Screening cholesterol level    6  Encounter for hepatitis C screening test for low risk patient  -     Hepatitis C antibody; Future    7  Iron deficiency anemia secondary to inadequate dietary iron intake  -     CBC and differential; Future  -     Ferritin; Future    8  Need for Tdap vaccination  -     TDAP VACCINE GREATER THAN OR EQUAL TO 6YO IM            There are no Patient Instructions on file for this visit  Return in about 6 months (around 2/9/2022) for Recheck  Subjective:      Patient ID: Effie Keller is a 24 y o  female  Chief Complaint   Patient presents with    Follow-up    Hypertension       HPI  Hypertension, used to follow up with a pediatric cardiologist for hypertension but recently is now out of the age range  Emilie Dupree to discuss changing medications due to risk of fetal side effects  Kat Serna is not currently sexually active and has no plans of conceiving but would like to stay on the safe side  Gian Marie blood pressures have been well controlled and she was diagnosed at the age of 6  She does not know the details of that and old records are not available from [facility]  Kat Serna does not check her blood pressure at home but occasionally does when she goes to the store and is well controlled  July 2019, was seen for a routine physical in June and blood pressure was elevated   At that time methyldopa was increased from b i d  To t i d  However she states that she often forgets the middle dose and has been routinely taking it b i d     Blood pressure today is is well controlled   She states home blood pressures are well controlled   Tolerating it well January 2020, compliant with medication and tolerating it well   Home blood pressure readings are not checked   Is 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  August 2020, has been working 3 jobs and will soon start her 27 year in college   Lost 10 lb but is not quite sure how  Hay Correa remains very active and is taking her medications and is compliant  April 2021, blood pressure medications were changed from methyldopa to amlodipine about 1 month ago  Hay Correa has been tracking her blood pressures which have been okay but occasionally she has elevated diastolic blood pressures   She is tolerating the medication without any problems and has not gained weight   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  Hay Correa also works in [employer] as a  so she is always moving   She states she does not have a high salt diet but eats lunch me almost every day and sometimes uses the packaged sausages  May 2021, new medication hydrochlorothiazide started 6 weeks ago  Significant improvement in blood pressure however home readings still elevated but better in general   She did not bring in her blood pressure machine today to have it checked  She feels well and has no side effects with the new medication  She is taking hydrochlorothiazide and 5 mg of amlodipine daily   August 2021, blood pressure very well controlled  Taking her medications and has not really changed anything in her diet    No side effects and she is compliant        The following portions of the patient's history were reviewed and updated as appropriate: allergies, current medications, past family history, past medical history, past social history, past surgical history and problem list     Review of Systems        Constitutional:  Denies fever or chills   Eyes:  Denies double or blurry vision  HENT:  Denies nasal congestion or sore throat   Respiratory:  Denies cough or shortness of breath or wheezing  Cardiovascular:  Denies palpitations or chest pain  GI:  Denies abdominal pain, nausea, or vomiting, no loose stools, no reflux  Integument:  Denies rash , no open areas  Neurologic:  Denies headache or focal weakness, no dizziness  : no dysuria      Current Outpatient Medications   Medication Sig Dispense Refill    amLODIPine (NORVASC) 5 mg tablet Take 1 tablet (5 mg total) by mouth daily 90 tablet 1    Biotin w/ Vitamins C & E (HAIR/SKIN/NAILS PO) Take by mouth      Calcium Carbonate-Vitamin D (CALCIUM 600+D) 600-200 MG-UNIT TABS Take 1 tablet by mouth daily      clindamycin (CLEOCIN T) 1 % lotion Apply 1 application topically 2 (two) times a day as needed (ezema) 60 mL 1    DAILY MULTIPLE VITAMINS PO Take 1 tablet by mouth daily       ergocalciferol (VITAMIN D2) 50,000 units TAKE ONE CAPSULE BY MOUTH EVERY 14 DAYS 6 capsule 0    fluticasone (FLONASE) 50 mcg/act nasal spray 2 sprays into each nostril daily      hydrochlorothiazide (HYDRODIURIL) 12 5 mg tablet Take 1 tablet (12 5 mg total) by mouth daily 90 tablet 1    IRON, FERROUS GLUCONATE, PO Take 1 tablet by mouth every other day      levonorgestrel (KYLEENA) 19 5 MG intrauterine device 1 Intra Uterine Device by Intrauterine route once      Probiotic Product (DIGESTIVE ADVANTAGE PO) Take 1 capsule by mouth every other day       No current facility-administered medications for this visit         Objective:    /70 (BP Location: Left arm, Patient Position: Sitting, Cuff Size: Large)   Pulse 79   Temp 98 2 °F (36 8 °C) (Temporal)   Ht 5' 3 78" (1 62 m)   Wt 130 kg (286 lb 4 8 oz)   SpO2 98%   BMI 49 48 kg/m²        Physical Exam       Constitutional:  Well developed, well nourished, no acute distress, non-toxic appearance   Eyes:  PERRL, conjunctiva normal , non icteric sclera  HENT:  Atraumatic, oropharynx moist  Neck-  supple   Respiratory:  CTA b/l, normal breath sounds, no rales, no wheezing   Cardiovascular:  RRR, no murmurs, no LE edema b/l  GI:  Soft, nondistended, normal bowel sounds x 4, nontender, no organomegaly, no mass, no rebound, no guarding   Neurologic:  no focal deficits noted   Psychiatric:  Speech and behavior appropriate , AAO x 3        Morley Knee, DO

## 2021-09-28 ENCOUNTER — TELEMEDICINE (OUTPATIENT)
Dept: INTERNAL MEDICINE CLINIC | Age: 21
End: 2021-09-28
Payer: COMMERCIAL

## 2021-09-28 VITALS — HEIGHT: 64 IN | WEIGHT: 286 LBS | BODY MASS INDEX: 48.83 KG/M2

## 2021-09-28 DIAGNOSIS — E55.9 VITAMIN D DEFICIENCY: ICD-10-CM
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
## 2021-09-28 DIAGNOSIS — M25.552 HIP PAIN, ACUTE, LEFT: ICD-10-CM

## 2021-09-28 PROCEDURE — 99213 OFFICE O/P EST LOW 20 MIN: CPT | Performed by: FAMILY MEDICINE

## 2021-09-28 RX ORDER — ERGOCALCIFEROL 1.25 MG/1
50000 CAPSULE ORAL
Qty: 6 CAPSULE | Refills: 3 | Status: SHIPPED | OUTPATIENT
Start: 2021-09-28 | End: 2022-03-10 | Stop reason: SDUPTHER

## 2021-09-28 NOTE — PROGRESS NOTES
Virtual Regular Visit    Verification of patient location:    Patient is located in the following state in which I hold an active license [place]      Assessment/Plan:    Problem List Items Addressed This Visit        Other    Vitamin D deficiency    Relevant Medications    ergocalciferol (VITAMIN D2) 50,000 units               Reason for visit is   Chief Complaint   Patient presents with    Hip Pain     left, s/p fall        Encounter provider Francisco J Estrada DO    Provider located at [place] [address] [place] [id number]      Recent Visits  No visits were found meeting these conditions  Showing recent visits within past 7 days and meeting all other requirements  Today's Visits  Date Type Provider Dept   09/28/21 [address], DO [facility]   Showing today's visits and meeting all other requirements  Future Appointments  No visits were found meeting these conditions  Showing future appointments within next 150 days and meeting all other requirements       The patient was identified by name and date of birth  Lucinda Chapman was informed that this is a telemedicine visit and that the visit is being conducted through [employer] , HIPAA-compliant platform  She agrees to proceed     My office door was closed  No one else was in the room  She acknowledged consent and understanding of privacy and security of the video platform  The patient has agreed to participate and understands they can discontinue the visit at any time  Patient is aware this is a billable service  Subjective  Lucinda Chapman is a 24 y o  female    HPI     Patient seen with video visit due to complaints of a fall after she put her foot on uneven pavement that felt like a small ditch  Her left foot when in and she went down on her left knee completely on to concrete    Since that time she feels like her left hip and pelvis area have shifted  She is having difficulty doing her dance routine and movements at school and takes Tylenol and Advil which helps with daily walking but not her other movements  She does not describe it as pain but states it is difficult and uncomfortable    At rest she has no discomfort and pain does not wake her up at night    Past Medical History:   Diagnosis Date    Acne     resolved-12/3/2015    Eczema     last assessed-12/3/2015    Gastroesophageal reflux disease without esophagitis 6/28/2019    Hypertension     last assessed-12/11/2017    Iron deficiency anemia 5/21/2019    Pneumonia 11/4/2019    Subluxation of left patella     last assessed-3/31/2015       Past Surgical History:   Procedure Laterality Date    TONSILLECTOMY      WISDOM TOOTH EXTRACTION  03/2020       Current Outpatient Medications   Medication Sig Dispense Refill    amLODIPine (NORVASC) 5 mg tablet Take 1 tablet (5 mg total) by mouth daily 90 tablet 1    Biotin w/ Vitamins C & E (HAIR/SKIN/NAILS PO) Take by mouth      Calcium Carbonate-Vitamin D (CALCIUM 600+D) 600-200 MG-UNIT TABS Take 1 tablet by mouth daily      clindamycin (CLEOCIN T) 1 % lotion Apply 1 application topically 2 (two) times a day as needed (ezema) 60 mL 1    DAILY MULTIPLE VITAMINS PO Take 1 tablet by mouth daily       ergocalciferol (VITAMIN D2) 50,000 units Take 1 capsule (50,000 Units total) by mouth every 14 (fourteen) days 6 capsule 3    fluticasone (FLONASE) 50 mcg/act nasal spray 2 sprays into each nostril daily      hydrochlorothiazide (HYDRODIURIL) 12 5 mg tablet Take 1 tablet (12 5 mg total) by mouth daily 90 tablet 1    IRON, FERROUS GLUCONATE, PO Take 1 tablet by mouth every other day      levonorgestrel (KYLEENA) 19 5 MG intrauterine device 1 Intra Uterine Device by Intrauterine route once      Probiotic Product (DIGESTIVE ADVANTAGE PO) Take 1 capsule by mouth every other day       No current facility-administered medications for this visit  Allergies   Allergen Reactions    Cefdinir Hives       Review of Systems     Constitutional:  Denies fever or chills   Eyes:  Denies double or blurry vision  HENT:  Denies nasal congestion or sore throat   Respiratory:  Denies cough or shortness of breath or wheezing  Cardiovascular:  Denies palpitations or chest pain  GI:  Denies abdominal pain, nausea, or vomiting, no loose stools, no reflux  Integument:  Denies rash , no open areas  Neurologic:  Denies headache or focal weakness, no dizziness  : no dysuria      Video Exam    Vitals:    09/28/21 0957   Weight: 130 kg (286 lb)   Height: 5' 4" (1 626 m)       Physical Exam     Constitutional:  Well developed, well nourished, no acute distress, non-toxic appearance   Eyes:  Is conjunctiva normal , non icteric sclera  HENT:  Atraumatic, non congested  Respiratory:  Nonlabored, appears comfortable, no conversational dyspnea  Cardiovascular:   no LE edema b/l  Neurologic:  no focal deficits noted   Psychiatric:  Speech and behavior appropriate , AAO x 3      I spent 7 minutes directly with the patient during this visit    VIRTUAL VISIT DISCLAIMER      Jackie Hull verbally agrees to participate in [employer]  Pt is aware that [employer] could be limited without vital signs or the ability to perform a full hands-on physical exam  Marlyn Todd understands she or the provider may request at any time to terminate the video visit and request the patient to seek care or treatment in person

## 2022-01-18 DIAGNOSIS — I10 ESSENTIAL HYPERTENSION: ICD-10-CM

## 2022-01-18 RX ORDER — AMLODIPINE BESYLATE 5 MG/1
5 TABLET ORAL DAILY
Qty: 90 TABLET | Refills: 1 | Status: SHIPPED | OUTPATIENT
Start: 2022-01-18 | End: 2022-03-10 | Stop reason: SDUPTHER

## 2022-03-09 ENCOUNTER — ANNUAL EXAM (OUTPATIENT)
Dept: OBGYN CLINIC | Facility: CLINIC | Age: 22
End: 2022-03-09
Payer: COMMERCIAL

## 2022-03-09 VITALS
DIASTOLIC BLOOD PRESSURE: 80 MMHG | WEIGHT: 284.2 LBS | HEIGHT: 63 IN | BODY MASS INDEX: 50.36 KG/M2 | SYSTOLIC BLOOD PRESSURE: 142 MMHG

## 2022-03-09 DIAGNOSIS — Z01.419 ENCNTR FOR GYN EXAM (GENERAL) (ROUTINE) W/O ABN FINDINGS: Primary | ICD-10-CM

## 2022-03-09 DIAGNOSIS — Z11.3 SCREEN FOR STD (SEXUALLY TRANSMITTED DISEASE): ICD-10-CM

## 2022-03-09 DIAGNOSIS — B36.9 FUNGAL DERMATITIS: ICD-10-CM

## 2022-03-09 PROBLEM — Z11.52 ENCOUNTER FOR SCREENING FOR COVID-19: Status: RESOLVED | Noted: 2021-01-18 | Resolved: 2022-03-09

## 2022-03-09 PROCEDURE — G0145 SCR C/V CYTO,THINLAYER,RESCR: HCPCS | Performed by: PHYSICIAN ASSISTANT

## 2022-03-09 PROCEDURE — 87591 N.GONORRHOEAE DNA AMP PROB: CPT | Performed by: PHYSICIAN ASSISTANT

## 2022-03-09 PROCEDURE — 99395 PREV VISIT EST AGE 18-39: CPT | Performed by: PHYSICIAN ASSISTANT

## 2022-03-09 PROCEDURE — 87491 CHLMYD TRACH DNA AMP PROBE: CPT | Performed by: PHYSICIAN ASSISTANT

## 2022-03-09 RX ORDER — NYSTATIN AND TRIAMCINOLONE ACETONIDE 100000; 1 [USP'U]/G; MG/G
OINTMENT TOPICAL 2 TIMES DAILY
Qty: 60 G | Refills: 1 | Status: SHIPPED | OUTPATIENT
Start: 2022-03-09 | End: 2022-05-16

## 2022-03-09 NOTE — PROGRESS NOTES
Chata Cruz  2000    CC:  Yearly exam    S:  24 y o  female here for yearly exam      She complains of vulvar itching/dry skin near her clitoris  Sexual activity: She is sexually active without pain, bleeding or dryness  Contraception:  She uses [place] (8/21/19) for contraception  She gets maybe a day of spotting per month, only when she wipes  STD testing:  She does request STD testing today  Gardasil:  She has had the Gardasil series  We reviewed ASCCP guidelines for Pap testing       Last Pap never    Family hx of breast cancer: no  Family hx of ovarian cancer: no  Family hx of colon cancer: no      Current Outpatient Medications:     amLODIPine (NORVASC) 5 mg tablet, Take 1 tablet (5 mg total) by mouth daily, Disp: 90 tablet, Rfl: 1    Biotin w/ Vitamins C & E (HAIR/SKIN/NAILS PO), Take by mouth, Disp: , Rfl:     Calcium Carbonate-Vitamin D (CALCIUM 600+D) 600-200 MG-UNIT TABS, Take 1 tablet by mouth daily, Disp: , Rfl:     clindamycin (CLEOCIN T) 1 % lotion, Apply 1 application topically 2 (two) times a day as needed (ezema), Disp: 60 mL, Rfl: 1    DAILY MULTIPLE VITAMINS PO, Take 1 tablet by mouth daily , Disp: , Rfl:     ergocalciferol (VITAMIN D2) 50,000 units, Take 1 capsule (50,000 Units total) by mouth every 14 (fourteen) days, Disp: 6 capsule, Rfl: 3    fluticasone (FLONASE) 50 mcg/act nasal spray, 2 sprays into each nostril daily, Disp: , Rfl:     hydrochlorothiazide (HYDRODIURIL) 12 5 mg tablet, Take 1 tablet (12 5 mg total) by mouth daily, Disp: 90 tablet, Rfl: 1    IRON, FERROUS GLUCONATE, PO, Take 1 tablet by mouth every other day, Disp: , Rfl:     levonorgestrel (KYLEENA) 19 5 MG intrauterine device, 1 Intra Uterine Device by Intrauterine route once, Disp: , Rfl:     Probiotic Product (DIGESTIVE ADVANTAGE PO), Take 1 capsule by mouth every other day, Disp: , Rfl:   Social History     Socioeconomic History    Marital status: Single     Spouse name: Not on file    Number of children: Not on file    Years of education: Not on file    Highest education level: Not on file   Occupational History    Not on file   Tobacco Use    Smoking status: Never Smoker    Smokeless tobacco: Never Used   Vaping Use    Vaping Use: Never used   Substance and Sexual Activity    Alcohol use: Yes    Drug use: No    Sexual activity: Yes     Partners: Male     Birth control/protection: I U D , Condom Male   Other Topics Concern    Not on file   Social History Narrative    Not on file     Social Determinants of Health     Financial Resource Strain: Not on file   Food Insecurity: Not on file   Transportation Needs: Not on file   Physical Activity: Not on file   Stress: Not on file   Social Connections: Not on file   Intimate Partner Violence: Not on file   Housing Stability: Not on file     Family History   Problem Relation Age of Onset    Allergic rhinitis Mother     Hypertension Mother     Diabetes Mother     Cancer Other     Diabetes Other     Thyroid disease Maternal Grandmother      Past Medical History:   Diagnosis Date    Acne     resolved-12/3/2015    Eczema     last assessed-12/3/2015    Gastroesophageal reflux disease without esophagitis 6/28/2019    Hypertension     last assessed-12/11/2017    Iron deficiency anemia 5/21/2019    Pneumonia 11/4/2019    Subluxation of left patella     last assessed-3/31/2015       Review of Systems   Respiratory: Negative  Cardiovascular: Negative  Gastrointestinal: Negative for constipation and diarrhea  Genitourinary: Negative for difficulty urinating, pelvic pain, vaginal bleeding, vaginal discharge, itching or odor  O:  Blood pressure 142/80, height 5' 3 39" (1 61 m), weight 129 kg (284 lb 3 2 oz)  Patient appears well and is not in distress  Neck is supple without masses  Breasts are symmetrical without mass, tenderness, nipple discharge, skin changes or adenopathy     Abdomen is soft and nontender without masses  External genitals show erythema and excoriation of the clitoral garcia   Urethra and urethral meatus are normal  Bladder is normal to palpation  Vagina is normal without discharge or bleeding  Cervix is normal without discharge or lesion  IUD strings are normal    Uterus is normal, mobile, nontender without palpable mass  Adnexa are normal, nontender, without palpable mass  A:   Yearly exam     Yeast vulvitis    P:   Pap with reflex HPV today    GC/chlamydia sent today    Nystatin ointment BID x 14 days, call in 2 weeks if not all better    RTO one year for yearly exam or sooner as needed

## 2022-03-09 NOTE — PROGRESS NOTES
Pt is here for her yearly exam  Last pap Never   Complaints of vaginal itching/ dry skin  Pt has had the Gardasil vaccines   Birth control- Kyleena iud   LMP Amenorrhea

## 2022-03-10 ENCOUNTER — OFFICE VISIT (OUTPATIENT)
Dept: INTERNAL MEDICINE CLINIC | Facility: CLINIC | Age: 22
End: 2022-03-10
Payer: COMMERCIAL

## 2022-03-10 VITALS
SYSTOLIC BLOOD PRESSURE: 120 MMHG | BODY MASS INDEX: 47.62 KG/M2 | TEMPERATURE: 98.3 F | OXYGEN SATURATION: 96 % | WEIGHT: 285.8 LBS | DIASTOLIC BLOOD PRESSURE: 84 MMHG | HEIGHT: 65 IN | HEART RATE: 78 BPM

## 2022-03-10 DIAGNOSIS — E55.9 VITAMIN D DEFICIENCY: ICD-10-CM

## 2022-03-10 DIAGNOSIS — Z13.220 SCREENING CHOLESTEROL LEVEL: ICD-10-CM

## 2022-03-10 DIAGNOSIS — I10 ESSENTIAL HYPERTENSION: ICD-10-CM

## 2022-03-10 DIAGNOSIS — Z11.59 ENCOUNTER FOR HEPATITIS C SCREENING TEST FOR LOW RISK PATIENT: ICD-10-CM

## 2022-03-10 DIAGNOSIS — I10 PRIMARY HYPERTENSION: Primary | ICD-10-CM

## 2022-03-10 DIAGNOSIS — S20.312A ABRASION OF LEFT CHEST WALL, INITIAL ENCOUNTER: ICD-10-CM

## 2022-03-10 DIAGNOSIS — D50.8 IRON DEFICIENCY ANEMIA SECONDARY TO INADEQUATE DIETARY IRON INTAKE: ICD-10-CM

## 2022-03-10 PROCEDURE — 3725F SCREEN DEPRESSION PERFORMED: CPT | Performed by: FAMILY MEDICINE

## 2022-03-10 PROCEDURE — 99214 OFFICE O/P EST MOD 30 MIN: CPT | Performed by: FAMILY MEDICINE

## 2022-03-10 RX ORDER — HYDROCHLOROTHIAZIDE 12.5 MG/1
12.5 TABLET ORAL DAILY
Qty: 90 TABLET | Refills: 1 | Status: SHIPPED | OUTPATIENT
Start: 2022-03-10

## 2022-03-10 RX ORDER — ERGOCALCIFEROL 1.25 MG/1
50000 CAPSULE ORAL
Qty: 6 CAPSULE | Refills: 3 | Status: SHIPPED | OUTPATIENT
Start: 2022-03-10

## 2022-03-10 RX ORDER — AMLODIPINE BESYLATE 5 MG/1
5 TABLET ORAL DAILY
Qty: 90 TABLET | Refills: 1 | Status: SHIPPED | OUTPATIENT
Start: 2022-03-10

## 2022-03-10 NOTE — PROGRESS NOTES
Assessment/Plan:    1  Primary hypertension    2  Essential hypertension  -     amLODIPine (NORVASC) 5 mg tablet; Take 1 tablet (5 mg total) by mouth daily  -     hydrochlorothiazide (HYDRODIURIL) 12 5 mg tablet; Take 1 tablet (12 5 mg total) by mouth daily  -     CBC and differential; Future  -     Comprehensive metabolic panel; Future    3  Vitamin D deficiency  -     ergocalciferol (VITAMIN D2) 50,000 units; Take 1 capsule (50,000 Units total) by mouth every 14 (fourteen) days  -     Vitamin D 25 hydroxy; Future    4  Iron deficiency anemia secondary to inadequate dietary iron intake  -     Iron Panel (Includes Ferritin, Iron Sat%, Iron, and TIBC); Future    5  Encounter for hepatitis C screening test for low risk patient  -     Hepatitis C antibody; Future    6  Screening cholesterol level  -     Lipid panel; Future    7  Abrasion of left chest wall, initial encounter  -     Stress test only, exercise; Future; Expected date: 03/10/2022            There are no Patient Instructions on file for this visit  Return in about 6 months (around 9/10/2022) for Recheck  Subjective:      Patient ID: Kamila Madrigal is a 24 y o  female  Chief Complaint   Patient presents with    Follow-up     6 month follow up   [facility] Screening     depression screen pap done 3/9 [facility]    Hypertension, used to follow up with a pediatric cardiologist for hypertension but recently is now out of the age range  Teresa Sanchez to discuss changing medications due to risk of fetal side effects  Twila Eddy is not currently sexually active and has no plans of conceiving but would like to stay on the safe side  Fransisco Eastern blood pressures have been well controlled and she was diagnosed at the age of 6   She does not know the details of that and old records are not available from [facility]  Twila Eddy does not check her blood pressure at home but occasionally does when she goes to the store and is well controlled  July 2019, was seen for a routine physical in June and blood pressure was elevated   At that time methyldopa was increased from b i d  To t i d  However she states that she often forgets the middle dose and has been routinely taking it b i d     Blood pressure today is is well controlled   She states home blood pressures are well controlled   Tolerating it well January 2020, compliant with medication and tolerating it well   Home blood pressure readings are not checked   Is 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  August 2020, has been working 3 jobs and will soon start her 27 year in college   Lost 10 lb but is not quite sure how  Cherelle Alexandra remains very active and is taking her medications and is compliant  April 2021, blood pressure medications were changed from methyldopa to amlodipine about 1 month ago  Cherelle Alexandra has been tracking her blood pressures which have been okay but occasionally she has elevated diastolic blood pressures   She is tolerating the medication without any problems and has not gained weight   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  Cherelle Alexandra also works in [employer] as a  so she is always moving   She states she does not have a high salt diet but eats lunch me almost every day and sometimes uses the packaged sausages  May 2021, new medication hydrochlorothiazide started 6 weeks ago   Significant improvement in blood pressure however home readings still elevated but better in general   She did not bring in her blood pressure machine today to have it checked   She feels well and has no side effects with the new medication   She is taking hydrochlorothiazide and 5 mg of amlodipine daily   August 2021, blood pressure very well controlled  Taking her medications and has not really changed anything in her diet  No side effects and she is compliant  March 2022, blood pressure well controlled    Has slight intermittent ankle edema with medications but has been doing more dancing at school on thought it may be from that  She is compliant  Last echocardiogram was May 2021 which was normal          Vitamin-D deficiency, last check March 2018, level is 24  Taking over-the-counter vitamin-D daily   July 2019, taking weekly vitamin-D and tolerating well   January 2020, levels of finally improved to 37 8   Taking vitamin-D as directed  Grisel Luevano  August 2020, taking weekly vitamin-D without any issues   Had recent lab work done  April 2021, takes 11742 International Units every 2 weeks  She is due for lab work   Milind Llamas 2022, taking 99268 International Units every 2 weeks and is compliant  Due for lab work  Chest pain, started 1 month ago and lasted 10 minutes but occurred 1 other time  Not associated with shortness of breath and no activity  She states she was sitting in class when this happened  She denies any heavy lifting any falls any trauma to the chest any ecchymosis or rashes  No pain with deep inspiration  She states is not happened since that time  Of note, patient's mother passed away in October suddenly  There were no cardiac history with her but she did have asthma and obesity  Patient is not aware of her family medical history from her dad side nor issue where from her maternal grandfather  Her maternal grandmother does not have any cardiac issues          Iron deficiency anemia, taking iron and a multivitamin   She is not sure if her multivitamin has iron   Had lab work done and his hemoglobin is stable  April 2021, last lab work was done July 2020 which was in range    She takes a multivitamin with iron      The following portions of the patient's history were reviewed and updated as appropriate: allergies, current medications, past family history, past medical history, past social history, past surgical history and problem list     Review of Systems      Constitutional:  Denies fever or chills   Eyes:  Denies double , blurry vision or eye pain  HENT:  Denies nasal congestion or sore throat   Respiratory:  Denies cough or shortness of breath or wheezing  Cardiovascular:  Denies palpitations or chest pain  GI:  Denies abdominal pain, nausea, or vomiting, no loose stools, no reflux  Integument:  Denies rash , no open areas  Neurologic:  Denies headache or focal weakness, no dizziness  : no dysuria, or hematuria        Current Outpatient Medications   Medication Sig Dispense Refill    amLODIPine (NORVASC) 5 mg tablet Take 1 tablet (5 mg total) by mouth daily 90 tablet 1    Biotin w/ Vitamins C & E (HAIR/SKIN/NAILS PO) Take by mouth      Calcium Carbonate-Vitamin D (CALCIUM 600+D) 600-200 MG-UNIT TABS Take 1 tablet by mouth daily      clindamycin (CLEOCIN T) 1 % lotion Apply 1 application topically 2 (two) times a day as needed (ezema) 60 mL 1    DAILY MULTIPLE VITAMINS PO Take 1 tablet by mouth daily       ergocalciferol (VITAMIN D2) 50,000 units Take 1 capsule (50,000 Units total) by mouth every 14 (fourteen) days 6 capsule 3    fluticasone (FLONASE) 50 mcg/act nasal spray 2 sprays into each nostril daily      hydrochlorothiazide (HYDRODIURIL) 12 5 mg tablet Take 1 tablet (12 5 mg total) by mouth daily 90 tablet 1    IRON, FERROUS GLUCONATE, PO Take 1 tablet by mouth every other day      levonorgestrel (KYLEENA) 19 5 MG intrauterine device 1 Intra Uterine Device by Intrauterine route once      nystatin-triamcinolone (MYCOLOG-II) ointment Apply topically 2 (two) times a day 60 g 1    Probiotic Product (DIGESTIVE ADVANTAGE PO) Take 1 capsule by mouth every other day       No current facility-administered medications for this visit         Objective:    /84 (BP Location: Left arm, Patient Position: Sitting, Cuff Size: Large)   Pulse 78   Temp 98 3 °F (36 8 °C) (Tympanic)   Ht 5' 4 92" (1 649 m)   Wt 130 kg (285 lb 12 8 oz)   LMP  (LMP Unknown)   SpO2 96% Comment: room air  BMI 47 68 kg/m²        Physical Exam       Constitutional:  Well developed, well nourished, no acute distress, non-toxic appearance   Eyes:  PERRL, conjunctiva normal , non icteric sclera  HENT:  Atraumatic, oropharynx moist  Neck-  supple   Respiratory:  CTA b/l, normal breath sounds, no rales, no wheezing   Cardiovascular:  RRR, no murmurs, no LE edema b/l  GI:  Soft, nondistended, normal bowel sounds x 4, nontender, no organomegaly, no mass, no rebound, no guarding   Neurologic:  no focal deficits noted   Psychiatric:  Speech and behavior appropriate , AAO x 3        Srinivasae Shanta, DO

## 2022-03-14 LAB
LAB AP GYN PRIMARY INTERPRETATION: NORMAL
Lab: NORMAL

## 2022-03-15 LAB
C TRACH DNA SPEC QL NAA+PROBE: NEGATIVE
N GONORRHOEA DNA SPEC QL NAA+PROBE: NEGATIVE

## 2022-03-28 ENCOUNTER — TELEMEDICINE (OUTPATIENT)
Dept: INTERNAL MEDICINE CLINIC | Age: 22
End: 2022-03-28
Payer: COMMERCIAL

## 2022-03-28 VITALS
TEMPERATURE: 97.9 F | DIASTOLIC BLOOD PRESSURE: 89 MMHG | WEIGHT: 285 LBS | HEIGHT: 65 IN | SYSTOLIC BLOOD PRESSURE: 145 MMHG | BODY MASS INDEX: 47.48 KG/M2

## 2022-03-28 DIAGNOSIS — I10 PRIMARY HYPERTENSION: ICD-10-CM

## 2022-03-28 DIAGNOSIS — J06.9 URTI (ACUTE UPPER RESPIRATORY INFECTION): Primary | ICD-10-CM

## 2022-03-28 PROCEDURE — 3077F SYST BP >= 140 MM HG: CPT | Performed by: INTERNAL MEDICINE

## 2022-03-28 PROCEDURE — 99214 OFFICE O/P EST MOD 30 MIN: CPT | Performed by: INTERNAL MEDICINE

## 2022-03-28 PROCEDURE — 3079F DIAST BP 80-89 MM HG: CPT | Performed by: INTERNAL MEDICINE

## 2022-03-28 PROCEDURE — 1036F TOBACCO NON-USER: CPT | Performed by: INTERNAL MEDICINE

## 2022-03-28 RX ORDER — AMOXICILLIN AND CLAVULANATE POTASSIUM 875; 125 MG/1; MG/1
1 TABLET, FILM COATED ORAL EVERY 12 HOURS SCHEDULED
Qty: 14 TABLET | Refills: 0 | Status: SHIPPED | OUTPATIENT
Start: 2022-03-28 | End: 2022-04-04

## 2022-03-28 NOTE — PROGRESS NOTES
Virtual Regular Visit    Verification of patient location:    Patient is located in the following state in which I hold an active license [place]      Assessment/Plan:    Upper respiratory tract infection  - likely viral with bacterial superinfection vs bacterial  - we will start pt on Augmentin 875-125 mg twice daily for 7 days, and she was counseled that she may use over-the-counter Flonase nasal spray for rhinitis and Mucinex for productive cough  - Pt was counseled to use OTC tylenol or ibuprofen with meals for aches and pains, warm salt water gargles for sore throat and was encouraged to drink lots of fluids, get plenty of rest and wash her hands liberally  - pt was also counseled to take antibiotics with food and also to use a probiotic like yogurt daily as long as she is taking antibiotics  -She should return to the clinic if her symptoms worsen or do not improve  Essential hypertension  -blood pressure is not well controlled today likely secondary to acute illness  -will hold off on adjusting her medications at this time  -continue with hydrochlorothiazide and amlodipine  -continue with a low-salt diet      Problem List Items Addressed This Visit        Cardiovascular and Mediastinum    Hypertension       Other    BMI 45 0-49 9, adult ([facility] [address] )      Other Visit Diagnoses     URTI (acute upper respiratory infection)    -  Primary    Relevant Medications    amoxicillin-clavulanate (AUGMENTIN) 875-125 mg per tablet          BMI Counseling: Body mass index is 47 54 kg/m²  The BMI is above normal  Nutrition recommendations include consuming healthier snacks, limiting drinks that contain sugar, reducing intake of saturated and trans fat and reducing intake of cholesterol  Exercise recommendations include moderate physical activity 150 minutes/week  No pharmacotherapy was ordered  Patient referred to PCP  Rationale for BMI follow-up plan is due to patient being overweight or obese           Reason for visit is   Chief Complaint   Patient presents with    Virtual Brief Visit     virtual-text--[phone]- ears feel blocked , coughing , congestion, sore throat- started friday- taking corricidin not helping - pt has all 3 covid shots, flu shot -  BMI FU NEEDED    Virtual Regular Visit        Encounter provider Musa Espinoza DO    Provider located at [address] [id number]      Recent Visits  No visits were found meeting these conditions  Showing recent visits within past 7 days and meeting all other requirements  Today's Visits  Date Type Provider Dept   03/28/22 Telemedicine Musa Espinoza DO [facility]   Showing today's visits and meeting all other requirements  Future Appointments  No visits were found meeting these conditions  Showing future appointments within next 150 days and meeting all other requirements       The patient was identified by name and date of birth  Eileen Connell was informed that this is a telemedicine visit and that the visit is being conducted through [facility] and patient was informed this is a secure, HIPAA-complaint platform  She agrees to proceed     My office door was closed  No one else was in the room  She acknowledged consent and understanding of privacy and security of the video platform  The patient has agreed to participate and understands they can discontinue the visit at any time  Patient is aware this is a billable service  Subjective  Eileen Connell is a 25 y o  female    HPI   Patient presents with complaints of scratchy throat that started 3 days ago with associated cough and rhinorrhea with greenish discharge that followed    She states that her symptoms progressed further and she now has decreased hearing in both ears with right ear pain, postnasal drip, head congestion with sinus pressure but she denies fever, chills, night sweats, chest pain, shortness a breath, palpitations, nausea, vomiting abdominal pain, diarrhea, constipation, myalgias, arthralgias, change in sense of taste or smell  She states that she is fully vaccinated for the [place]      Past Medical History:   Diagnosis Date    Acne     resolved-12/3/2015    Eczema     last assessed-12/3/2015    Gastroesophageal reflux disease without esophagitis 6/28/2019    Hypertension     last assessed-12/11/2017    Iron deficiency anemia 5/21/2019    Pneumonia 11/4/2019    Subluxation of left patella     last assessed-3/31/2015       Past Surgical History:   Procedure Laterality Date    TONSILLECTOMY      WISDOM TOOTH EXTRACTION  03/2020       Current Outpatient Medications   Medication Sig Dispense Refill    amLODIPine (NORVASC) 5 mg tablet Take 1 tablet (5 mg total) by mouth daily 90 tablet 1    Biotin w/ Vitamins C & E (HAIR/SKIN/NAILS PO) Take by mouth      Calcium Carbonate-Vitamin D (CALCIUM 600+D) 600-200 MG-UNIT TABS Take 1 tablet by mouth daily      clindamycin (CLEOCIN T) 1 % lotion Apply 1 application topically 2 (two) times a day as needed (ezema) 60 mL 1    DAILY MULTIPLE VITAMINS PO Take 1 tablet by mouth daily       ergocalciferol (VITAMIN D2) 50,000 units Take 1 capsule (50,000 Units total) by mouth every 14 (fourteen) days 6 capsule 3    fluticasone (FLONASE) 50 mcg/act nasal spray 2 sprays into each nostril daily      hydrochlorothiazide (HYDRODIURIL) 12 5 mg tablet Take 1 tablet (12 5 mg total) by mouth daily 90 tablet 1    IRON, FERROUS GLUCONATE, PO Take 1 tablet by mouth every other day      levonorgestrel (KYLEENA) 19 5 MG intrauterine device 1 Intra Uterine Device by Intrauterine route once      nystatin-triamcinolone (MYCOLOG-II) ointment Apply topically 2 (two) times a day 60 g 1    Probiotic Product (DIGESTIVE ADVANTAGE PO) Take 1 capsule by mouth every other day      amoxicillin-clavulanate (AUGMENTIN) 875-125 mg per tablet Take 1 tablet by mouth every 12 (twelve) hours for 7 days 14 tablet 0     No current facility-administered medications for this visit  Allergies   Allergen Reactions    Cefdinir Hives     Has tolerated Augmentin  Review of Systems   Constitutional: Negative for activity change, chills, fatigue, fever and unexpected weight change  HENT: Positive for congestion (head congestion), ear pain (R ear ache), hearing loss, postnasal drip, rhinorrhea, sinus pressure and sore throat  Negative for sinus pain  Eyes: Negative for pain  Respiratory: Positive for cough (productive of greenish phlegm, has been taking coricidin and it is not working  took the covid shots)  Negative for choking, chest tightness, shortness of breath and wheezing  Cardiovascular: Negative for chest pain, palpitations and leg swelling  Gastrointestinal: Negative for abdominal pain, constipation, diarrhea, nausea and vomiting  Genitourinary: Negative for dysuria and hematuria  Musculoskeletal: Negative for arthralgias, back pain, gait problem, joint swelling, myalgias and neck stiffness  Skin: Negative for pallor and rash  Neurological: Positive for headaches (Chronic and unchanged)  Negative for dizziness, tremors, seizures, syncope and light-headedness  Hematological: Negative for adenopathy  Psychiatric/Behavioral: Negative for behavioral problems  Video Exam    Vitals:    03/28/22 1146   BP: 145/89   Temp: 97 9 °F (36 6 °C)   TempSrc: Temporal   Weight: 129 kg (285 lb)   Height: 5' 4 92" (1 649 m)       Physical Exam  Constitutional:       General: She is not in acute distress  Appearance: Normal appearance  She is obese  She is not ill-appearing or toxic-appearing  Comments: BMI is 47 54   HENT:      Head: Normocephalic and atraumatic  Mouth/Throat:      Mouth: Mucous membranes are dry  Pharynx: Posterior oropharyngeal erythema ( oropharyngeal erythema with very dry mucous membranes) present     Eyes: General: No scleral icterus  Right eye: No discharge  Left eye: No discharge  Pulmonary:      Effort: Pulmonary effort is normal  No respiratory distress (No conversational dyspnea but patient was coughing intermittently throughout the visit)  Abdominal:      Tenderness: There is no abdominal tenderness  Musculoskeletal:      Cervical back: Normal range of motion  Lymphadenopathy:      Head:      Left side of head: Submandibular ( left submandibular lymphadenopathy on self palpation) adenopathy present  Skin:     Coloration: Skin is not jaundiced  Neurological:      Mental Status: She is alert and oriented to person, place, and time  Psychiatric:         Behavior: Behavior normal           I spent 15 minutes directly with the patient during this visit    VIRTUAL VISIT DISCLAIMER      Sera Torres verbally agrees to participate in [employer]  Pt is aware that [employer] could be limited without vital signs or the ability to perform a full hands-on physical exam  Marlyn Smith understands she or the provider may request at any time to terminate the video visit and request the patient to seek care or treatment in person

## 2022-04-19 ENCOUNTER — TELEMEDICINE (OUTPATIENT)
Dept: INTERNAL MEDICINE CLINIC | Age: 22
End: 2022-04-19
Payer: COMMERCIAL

## 2022-04-19 VITALS — DIASTOLIC BLOOD PRESSURE: 105 MMHG | TEMPERATURE: 97.1 F | SYSTOLIC BLOOD PRESSURE: 152 MMHG

## 2022-04-19 DIAGNOSIS — J45.20 MILD INTERMITTENT ASTHMATIC BRONCHITIS WITHOUT COMPLICATION: Primary | ICD-10-CM

## 2022-04-19 PROBLEM — J45.909 ASTHMATIC BRONCHITIS: Status: ACTIVE | Noted: 2022-04-19

## 2022-04-19 PROCEDURE — 1036F TOBACCO NON-USER: CPT | Performed by: STUDENT IN AN ORGANIZED HEALTH CARE EDUCATION/TRAINING PROGRAM

## 2022-04-19 PROCEDURE — 3080F DIAST BP >= 90 MM HG: CPT | Performed by: STUDENT IN AN ORGANIZED HEALTH CARE EDUCATION/TRAINING PROGRAM

## 2022-04-19 PROCEDURE — 99213 OFFICE O/P EST LOW 20 MIN: CPT | Performed by: STUDENT IN AN ORGANIZED HEALTH CARE EDUCATION/TRAINING PROGRAM

## 2022-04-19 PROCEDURE — 3077F SYST BP >= 140 MM HG: CPT | Performed by: STUDENT IN AN ORGANIZED HEALTH CARE EDUCATION/TRAINING PROGRAM

## 2022-04-19 RX ORDER — ALBUTEROL SULFATE 90 UG/1
2 AEROSOL, METERED RESPIRATORY (INHALATION) EVERY 6 HOURS PRN
Qty: 6.7 G | Refills: 0 | Status: SHIPPED | OUTPATIENT
Start: 2022-04-19

## 2022-04-19 RX ORDER — PREDNISONE 20 MG/1
40 TABLET ORAL DAILY
Qty: 10 TABLET | Refills: 0 | Status: SHIPPED | OUTPATIENT
Start: 2022-04-19 | End: 2022-04-24

## 2022-04-19 RX ORDER — DEXTROMETHORPHAN HYDROBROMIDE AND PROMETHAZINE HYDROCHLORIDE 15; 6.25 MG/5ML; MG/5ML
5 SOLUTION ORAL 4 TIMES DAILY PRN
Qty: 180 ML | Refills: 0 | Status: SHIPPED | OUTPATIENT
Start: 2022-04-19 | End: 2022-04-26

## 2022-04-19 NOTE — LETTER
April 19, 2022     Patient: Tamika Miranda   YOB: 2000   Date of Visit: 4/19/2022       To Whom It May Concern:    Tamika Miranda was seen in my clinic on 4/19/2022 at 2:30 pm  Please excuse Theone Yasmani for her absence from work on this day to make the appointment  If you have any questions or concerns, please don't hesitate to call           Sincerely,         Mk Pierre MD        CC: No Recipients

## 2022-04-19 NOTE — ASSESSMENT & PLAN NOTE
Secondary to URI adequately treated with Augmentin course  Will treat ongoing symptoms with 5 day prednisone course  Prescribed albuterol inhaler to be used every 6 hours as needed for shortness of breath  Promethazine DM 4 times daily as needed for cough  Use over-the-counter antihistamine like Claritin or Allegra and Flonase nasal spray

## 2022-04-19 NOTE — PROGRESS NOTES
Virtual Regular Visit    Verification of patient location:    Patient is located in the following state in which I hold an active license [place]      Assessment/Plan:    Problem List Items Addressed This Visit        Respiratory    Asthmatic bronchitis - Primary     Secondary to URI adequately treated with Augmentin course  Will treat ongoing symptoms with 5 day prednisone course  Prescribed albuterol inhaler to be used every 6 hours as needed for shortness of breath  Promethazine DM 4 times daily as needed for cough  Use over-the-counter antihistamine like Claritin or Allegra and Flonase nasal spray  Relevant Medications    albuterol (Proventil HFA) 90 mcg/act inhaler    Promethazine-DM (PHENERGAN-DM) 6 25-15 mg/5 mL oral syrup    predniSONE 20 mg tablet               Reason for visit is   Chief Complaint   Patient presents with    Virtual Regular Visit     cough, sinus congestion and fever  ongoing since last virtual appt   Virtual Regular Visit        Encounter provider Lang Milan MD    Provider located at [address] [id number]      Recent Visits  No visits were found meeting these conditions  Showing recent visits within past 7 days and meeting all other requirements  Today's Visits  Date Type Provider Dept   04/19/22 Telemedicine Lang Milan MD [facility]   Showing today's visits and meeting all other requirements  Future Appointments  No visits were found meeting these conditions  Showing future appointments within next 150 days and meeting all other requirements       The patient was identified by name and date of birth  Bill Bethea was informed that this is a telemedicine visit and that the visit is being conducted through [facility] and patient was informed that this is not a secure, HIPAA-compliant platform  She agrees to proceed     My office door was closed  No one else was in the room  She acknowledged consent and understanding of privacy and security of the video platform  The patient has agreed to participate and understands they can discontinue the visit at any time  Patient is aware this is a billable service  Subjective  Kavon Obrien is a 25 y o  female  HPI     Patient is evaluated today for ongoing symptoms of cough, congestion and stuffy nose  She was seen on 08/23 for URI symptoms  She was given 7 day course of Augmentin which she completed and felt better for 3-4 days  She had recurrence of her symptoms thereafter  She reports ongoing cough productive of whitish sputum  She had 1 episode of vomiting and a temperature of 101°  She was using Mucinex and Mucinex sinus max with minimal relief  She reports abdominal soreness from persistent coughing  She is worried about bronchitis  No history of asthma or seasonal allergies or sick contacts      Past Medical History:   Diagnosis Date    Acne     resolved-12/3/2015    Eczema     last assessed-12/3/2015    Gastroesophageal reflux disease without esophagitis 6/28/2019    Hypertension     last assessed-12/11/2017    Iron deficiency anemia 5/21/2019    Pneumonia 11/4/2019    Subluxation of left patella     last assessed-3/31/2015       Past Surgical History:   Procedure Laterality Date    TONSILLECTOMY      WISDOM TOOTH EXTRACTION  03/2020       Current Outpatient Medications   Medication Sig Dispense Refill    amLODIPine (NORVASC) 5 mg tablet Take 1 tablet (5 mg total) by mouth daily 90 tablet 1    Biotin w/ Vitamins C & E (HAIR/SKIN/NAILS PO) Take by mouth in the morning        Calcium Carbonate-Vitamin D (CALCIUM 600+D) 600-200 MG-UNIT TABS Take 1 tablet by mouth daily      clindamycin (CLEOCIN T) 1 % lotion Apply 1 application topically 2 (two) times a day as needed (ezema) 60 mL 1    DAILY MULTIPLE VITAMINS PO Take 1 tablet by mouth daily       ergocalciferol (VITAMIN D2) 50,000 units Take 1 capsule (50,000 Units total) by mouth every 14 (fourteen) days 6 capsule 3    fluticasone (FLONASE) 50 mcg/act nasal spray 2 sprays into each nostril daily      hydrochlorothiazide (HYDRODIURIL) 12 5 mg tablet Take 1 tablet (12 5 mg total) by mouth daily 90 tablet 1    IRON, FERROUS GLUCONATE, PO Take 1 tablet by mouth every other day      levonorgestrel (KYLEENA) 19 5 MG intrauterine device 1 Intra Uterine Device by Intrauterine route once      nystatin-triamcinolone (MYCOLOG-II) ointment Apply topically 2 (two) times a day 60 g 1    Probiotic Product (DIGESTIVE ADVANTAGE PO) Take 1 capsule by mouth every other day      albuterol (Proventil HFA) 90 mcg/act inhaler Inhale 2 puffs every 6 (six) hours as needed for wheezing 6 7 g 0    predniSONE 20 mg tablet Take 2 tablets (40 mg total) by mouth daily for 5 days 10 tablet 0    Promethazine-DM (PHENERGAN-DM) 6 25-15 mg/5 mL oral syrup Take 5 mL by mouth 4 (four) times a day as needed for cough for up to 7 days 180 mL 0     No current facility-administered medications for this visit  Allergies   Allergen Reactions    Cefdinir Hives     Has tolerated Augmentin  Review of Systems   Constitutional: Positive for fever  Negative for chills  HENT: Positive for congestion  Negative for ear pain and sore throat  Eyes: Negative for pain and visual disturbance  Respiratory: Positive for cough and shortness of breath  Cardiovascular: Negative for chest pain and palpitations  Gastrointestinal: Negative for abdominal pain and vomiting  Abdominal muscle soreness   Genitourinary: Negative for dysuria and hematuria  Musculoskeletal: Negative for arthralgias and back pain  Skin: Negative for color change and rash  Neurological: Negative for seizures and syncope  All other systems reviewed and are negative        Video Exam    Vitals:    04/19/22 1417   BP: (!) 152/105   BP Location: Left arm   Patient Position: Sitting Cuff Size: Large   Temp: (!) 97 1 °F (36 2 °C)   TempSrc: Temporal       Physical Exam  Constitutional:       General: She is not in acute distress  Appearance: Normal appearance  She is not ill-appearing or toxic-appearing  HENT:      Head: Normocephalic and atraumatic  Pulmonary:      Effort: No respiratory distress  Neurological:      Mental Status: She is alert and oriented to person, place, and time  Mental status is at baseline  I spent 15 minutes directly with the patient during this visit    VIRTUAL VISIT DISCLAIMER      Karla Camara verbally agrees to participate in [employer]  Pt is aware that [employer] could be limited without vital signs or the ability to perform a full hands-on physical exam  Marlyn Yousif understands she or the provider may request at any time to terminate the video visit and request the patient to seek care or treatment in person

## 2022-05-16 DIAGNOSIS — B36.9 FUNGAL DERMATITIS: ICD-10-CM

## 2022-05-16 RX ORDER — NYSTATIN AND TRIAMCINOLONE ACETONIDE 100000; 1 [USP'U]/G; MG/G
OINTMENT TOPICAL 2 TIMES DAILY
Qty: 60 G | Refills: 0 | Status: SHIPPED | OUTPATIENT
Start: 2022-05-16

## 2022-05-27 ENCOUNTER — CLINICAL SUPPORT (OUTPATIENT)
Dept: DENTISTRY | Facility: CLINIC | Age: 22
End: 2022-05-27

## 2022-05-27 VITALS — TEMPERATURE: 97.7 F | HEART RATE: 85 BPM | SYSTOLIC BLOOD PRESSURE: 134 MMHG | DIASTOLIC BLOOD PRESSURE: 79 MMHG

## 2022-05-27 DIAGNOSIS — Z01.20 ENCOUNTER FOR DENTAL EXAMINATION: Primary | ICD-10-CM

## 2022-05-27 PROCEDURE — D0120 PERIODIC ORAL EVALUATION - ESTABLISHED PATIENT: HCPCS | Performed by: DENTIST

## 2022-05-27 PROCEDURE — D0274 BITEWINGS - 4 RADIOGRAPHIC IMAGES: HCPCS | Performed by: DENTIST

## 2022-05-27 PROCEDURE — D1110 PROPHYLAXIS - ADULT: HCPCS | Performed by: DENTIST

## 2022-05-27 NOTE — PROGRESS NOTES
24 y/o F presented for a recall appointment (periodic exam, prophy, 4 BW's)  The patient's last visit was 8/2020  Extraoral exam: WNL  Intraoral exam: WNL (oral cancer screening completed with no findings noted)    Perio charting completed with all sulcus depths 1-3mm and no BOP  4 bitewings were taken  Patient has areas of incipient interproximal decay that will be watched  Caries found #15-O - planned for resin composite restoration  Teeth were scaled using cavitron and hand instruments, polished, and flossed  OHI reviewed with patient who reports brushing at least 1x/day with fluoridated toothpaste and flossing infrequently  Emphasized importance of home care to minimize incipient caries presentation  The patient was dismissed satisfied with treatment      NV1: #15-O resin composite   NV2: 6 month recall

## 2022-08-29 DIAGNOSIS — I10 ESSENTIAL HYPERTENSION: ICD-10-CM

## 2022-08-29 DIAGNOSIS — E55.9 VITAMIN D DEFICIENCY: ICD-10-CM

## 2022-08-30 RX ORDER — AMLODIPINE BESYLATE 5 MG/1
5 TABLET ORAL DAILY
Qty: 90 TABLET | Refills: 1 | Status: SHIPPED | OUTPATIENT
Start: 2022-08-30

## 2022-08-30 RX ORDER — ERGOCALCIFEROL 1.25 MG/1
50000 CAPSULE ORAL
Qty: 6 CAPSULE | Refills: 1 | Status: SHIPPED | OUTPATIENT
Start: 2022-08-30

## 2022-09-26 ENCOUNTER — OFFICE VISIT (OUTPATIENT)
Dept: INTERNAL MEDICINE CLINIC | Age: 22
End: 2022-09-26
Payer: COMMERCIAL

## 2022-09-26 ENCOUNTER — APPOINTMENT (OUTPATIENT)
Dept: LAB | Age: 22
End: 2022-09-26
Payer: COMMERCIAL

## 2022-09-26 VITALS
OXYGEN SATURATION: 98 % | SYSTOLIC BLOOD PRESSURE: 138 MMHG | HEIGHT: 65 IN | HEART RATE: 80 BPM | BODY MASS INDEX: 48.82 KG/M2 | WEIGHT: 293 LBS | DIASTOLIC BLOOD PRESSURE: 86 MMHG | TEMPERATURE: 99.2 F

## 2022-09-26 DIAGNOSIS — D50.8 IRON DEFICIENCY ANEMIA SECONDARY TO INADEQUATE DIETARY IRON INTAKE: ICD-10-CM

## 2022-09-26 DIAGNOSIS — I10 PRIMARY HYPERTENSION: ICD-10-CM

## 2022-09-26 DIAGNOSIS — Z11.59 ENCOUNTER FOR HEPATITIS C SCREENING TEST FOR LOW RISK PATIENT: ICD-10-CM

## 2022-09-26 DIAGNOSIS — I10 ESSENTIAL HYPERTENSION: ICD-10-CM

## 2022-09-26 DIAGNOSIS — E55.9 VITAMIN D DEFICIENCY: ICD-10-CM

## 2022-09-26 DIAGNOSIS — Z13.220 SCREENING CHOLESTEROL LEVEL: ICD-10-CM

## 2022-09-26 DIAGNOSIS — E55.9 VITAMIN D DEFICIENCY: Primary | ICD-10-CM
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
## 2022-09-26 PROBLEM — M25.552 HIP PAIN, ACUTE, LEFT: Status: RESOLVED | Noted: 2021-09-28 | Resolved: 2022-09-26

## 2022-09-26 PROBLEM — J45.909 ASTHMATIC BRONCHITIS: Status: RESOLVED | Noted: 2022-04-19 | Resolved: 2022-09-26

## 2022-09-26 PROBLEM — J06.9 URTI (ACUTE UPPER RESPIRATORY INFECTION): Status: RESOLVED | Noted: 2018-05-14 | Resolved: 2022-09-26

## 2022-09-26 LAB
25(OH)D3 SERPL-MCNC: 46.7 NG/ML (ref 30–100)
ALBUMIN SERPL BCP-MCNC: 4 G/DL (ref 3.5–5)
ALP SERPL-CCNC: 49 U/L (ref 46–116)
ALT SERPL W P-5'-P-CCNC: 21 U/L (ref 12–78)
ANION GAP SERPL CALCULATED.3IONS-SCNC: 6 MMOL/L (ref 4–13)
AST SERPL W P-5'-P-CCNC: 10 U/L (ref 5–45)
BASOPHILS # BLD AUTO: 0.05 THOUSANDS/ΜL (ref 0–0.1)
BASOPHILS NFR BLD AUTO: 1 % (ref 0–1)
BILIRUB SERPL-MCNC: 0.54 MG/DL (ref 0.2–1)
BUN SERPL-MCNC: 12 MG/DL (ref 5–25)
CALCIUM SERPL-MCNC: 9.2 MG/DL (ref 8.3–10.1)
CHLORIDE SERPL-SCNC: 109 MMOL/L (ref 96–108)
CHOLEST SERPL-MCNC: 122 MG/DL
CO2 SERPL-SCNC: 25 MMOL/L (ref 21–32)
CREAT SERPL-MCNC: 0.56 MG/DL (ref 0.6–1.3)
EOSINOPHIL # BLD AUTO: 0.1 THOUSAND/ΜL (ref 0–0.61)
EOSINOPHIL NFR BLD AUTO: 1 % (ref 0–6)
ERYTHROCYTE [DISTWIDTH] IN BLOOD BY AUTOMATED COUNT: 12.1 % (ref 11.6–15.1)
FERRITIN SERPL-MCNC: 91 NG/ML (ref 8–388)
GFR SERPL CREATININE-BSD FRML MDRD: 132 ML/MIN/1.73SQ M
GLUCOSE P FAST SERPL-MCNC: 80 MG/DL (ref 65–99)
HCT VFR BLD AUTO: 39.3 % (ref 34.8–46.1)
HCV AB SER QL: NORMAL
HDLC SERPL-MCNC: 52 MG/DL
HGB BLD-MCNC: 12.8 G/DL (ref 11.5–15.4)
IMM GRANULOCYTES # BLD AUTO: 0.03 THOUSAND/UL (ref 0–0.2)
IMM GRANULOCYTES NFR BLD AUTO: 0 % (ref 0–2)
IRON SATN MFR SERPL: 22 % (ref 15–50)
IRON SERPL-MCNC: 69 UG/DL (ref 50–170)
LDLC SERPL CALC-MCNC: 57 MG/DL (ref 0–100)
LYMPHOCYTES # BLD AUTO: 1.95 THOUSANDS/ΜL (ref 0.6–4.47)
LYMPHOCYTES NFR BLD AUTO: 22 % (ref 14–44)
MCH RBC QN AUTO: 30.3 PG (ref 26.8–34.3)
MCHC RBC AUTO-ENTMCNC: 32.6 G/DL (ref 31.4–37.4)
MCV RBC AUTO: 93 FL (ref 82–98)
MONOCYTES # BLD AUTO: 0.58 THOUSAND/ΜL (ref 0.17–1.22)
MONOCYTES NFR BLD AUTO: 7 % (ref 4–12)
NEUTROPHILS # BLD AUTO: 6.22 THOUSANDS/ΜL (ref 1.85–7.62)
NEUTS SEG NFR BLD AUTO: 69 % (ref 43–75)
NONHDLC SERPL-MCNC: 70 MG/DL
NRBC BLD AUTO-RTO: 0 /100 WBCS
PLATELET # BLD AUTO: 377 THOUSANDS/UL (ref 149–390)
PMV BLD AUTO: 9.4 FL (ref 8.9–12.7)
POTASSIUM SERPL-SCNC: 3.8 MMOL/L (ref 3.5–5.3)
PROT SERPL-MCNC: 7.7 G/DL (ref 6.4–8.4)
RBC # BLD AUTO: 4.23 MILLION/UL (ref 3.81–5.12)
SODIUM SERPL-SCNC: 140 MMOL/L (ref 135–147)
TIBC SERPL-MCNC: 320 UG/DL (ref 250–450)
TRIGL SERPL-MCNC: 64 MG/DL
TSH SERPL DL<=0.05 MIU/L-ACNC: 1.93 UIU/ML (ref 0.45–4.5)
WBC # BLD AUTO: 8.93 THOUSAND/UL (ref 4.31–10.16)

## 2022-09-26 PROCEDURE — 83540 ASSAY OF IRON: CPT

## 2022-09-26 PROCEDURE — 84443 ASSAY THYROID STIM HORMONE: CPT

## 2022-09-26 PROCEDURE — 80053 COMPREHEN METABOLIC PANEL: CPT

## 2022-09-26 PROCEDURE — 99214 OFFICE O/P EST MOD 30 MIN: CPT | Performed by: FAMILY MEDICINE

## 2022-09-26 PROCEDURE — 80061 LIPID PANEL: CPT

## 2022-09-26 PROCEDURE — 86803 HEPATITIS C AB TEST: CPT

## 2022-09-26 PROCEDURE — 83550 IRON BINDING TEST: CPT

## 2022-09-26 PROCEDURE — 82728 ASSAY OF FERRITIN: CPT

## 2022-09-26 PROCEDURE — 3075F SYST BP GE 130 - 139MM HG: CPT | Performed by: FAMILY MEDICINE

## 2022-09-26 PROCEDURE — 82306 VITAMIN D 25 HYDROXY: CPT

## 2022-09-26 PROCEDURE — 36415 COLL VENOUS BLD VENIPUNCTURE: CPT

## 2022-09-26 PROCEDURE — 85025 COMPLETE CBC W/AUTO DIFF WBC: CPT

## 2022-09-26 PROCEDURE — 3079F DIAST BP 80-89 MM HG: CPT | Performed by: FAMILY MEDICINE

## 2022-09-26 NOTE — PROGRESS NOTES
Assessment/Plan:    1  Vitamin D deficiency    2  Primary hypertension    3  BMI 45 0-49 9, adult (HCC)  -     TSH, 3rd generation with Free T4 reflex; Future      BMI Counseling: Body mass index is 48 76 kg/m²  The BMI is above normal  Nutrition recommendations include moderation in carbohydrate intake  Exercise recommendations include exercising 3-5 times per week  No pharmacotherapy was ordered  Rationale for BMI follow-up plan is due to patient being overweight or obese  Advised to start over-the-counter fiber supplementation for help with weight loss  Check blood pressure at home and call me  Last blood pressure visit was 120/84  Slightly elevated today which may be a flare flexion of weight or Marshal dense  Check stress test, unknown cause of death in mother approximately 1 year ago  Lab work do which she states she will get today  No changes in medications today  Recommend flu vaccine and COVID vaccine  Call if home blood pressure readings are high    There are no Patient Instructions on file for this visit  Return in about 6 months (around 3/26/2023) for Recheck  Subjective:      Patient ID: Renea Theodore is a 25 y o  female  Chief Complaint   Patient presents with    Follow-up     No refills, no new concerns, will get labs done today   Hypertension       HPI  Hypertension, used to follow up with a pediatric cardiologist for hypertension but recently is now out of the age range  Halina Weber to discuss changing medications due to risk of fetal side effects  Leslie Chapman is not currently sexually active and has no plans of conceiving but would like to stay on the safe side  Cheri Orozco blood pressures have been well controlled and she was diagnosed at the age of 6   She does not know the details of that and old records are not available from [facility]  Leslie Chapman does not check her blood pressure at home but occasionally does when she goes to the store and is well controlled  July 2019, was seen for a routine physical in June and blood pressure was elevated   At that time methyldopa was increased from b i d  To t i d  However she states that she often forgets the middle dose and has been routinely taking it b i d     Blood pressure today is is well controlled   She states home blood pressures are well controlled   Tolerating it well January 2020, compliant with medication and tolerating it well   Home blood pressure readings are not checked   Is 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  August 2020, has been working 3 jobs and will soon start her 27 year in college   Lost 10 lb but is not quite sure how  Maru Jackson remains very active and is taking her medications and is compliant  April 2021, blood pressure medications were changed from methyldopa to amlodipine about 1 month ago  Maru Jackson has been tracking her blood pressures which have been okay but occasionally she has elevated diastolic blood pressures   She is tolerating the medication without any problems and has not gained weight   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  Maru Jackson also works in [employer] as a  so she is always moving   She states she does not have a high salt diet but eats lunch me almost every day and sometimes uses the packaged sausages  May 2021, new medication hydrochlorothiazide started 6 weeks ago   Significant improvement in blood pressure however home readings still elevated but better in general   She did not bring in her blood pressure machine today to have it checked   She feels well and has no side effects with the new medication  Maru Jackson is taking hydrochlorothiazide and 5 mg of amlodipine daily   August 2021, blood pressure very well controlled   Taking her medications and has not really changed anything in her diet   No side effects and she is compliant  March 2022, blood pressure well controlled    Has slight intermittent ankle edema with medications but has been doing more dancing at school on thought it may be from that  She is compliant  Last echocardiogram was May 2021 which was normal    September 2022, compliant with medications  Has gained slight weight  No lower extremity edema but states with long car rides when she goes to visit relatives do exacerbate this  She is not checking blood pressure at home         Vitamin-D deficiency, last check March 2018, level is 24  Taking over-the-counter vitamin-D daily   July 2019, taking weekly vitamin-D and tolerating well   January 2020, levels of finally improved to 37 8   Taking vitamin-D as directed  [place]  August 2020, taking weekly vitamin-D without any issues   Had recent lab work done  April 2021, takes 06236 International Units every 2 weeks  James Feliz is due for lab work   Will Barthel 2022, taking 89324 International Units every 2 weeks and is compliant  Due for lab work  September 2022, due for lab work     Chest pain, started 1 month ago and lasted 10 minutes but occurred 1 other time  Not associated with shortness of breath and no activity  She states she was sitting in class when this happened  She denies any heavy lifting any falls any trauma to the chest any ecchymosis or rashes  No pain with deep inspiration  She states is not happened since that time  Of note, patient's mother passed away in October suddenly  There were no cardiac history with her but she did have asthma and obesity  Patient is not aware of her family medical history from her dad side nor issue where from her maternal grandfather  Her maternal grandmother does not have any cardiac issues  September 2022, has not been having this progressive late but still has intermittently that she describes as twinges  No difficulty with breathing no exacerbation with deep breaths    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 and has no difficulty doing that      The following portions of the patient's history were reviewed and updated as appropriate: allergies, current medications, past family history, past medical history, past social history, past surgical history and problem list     Review of Systems        Constitutional:  Denies fever or chills , slight weight gain since last visit  Eyes:  Denies double , blurry vision or eye pain  HENT:  Denies nasal congestion or sore throat   Respiratory:  Denies cough or shortness of breath or wheezing  Cardiovascular:  Denies palpitations or chest pain  GI:  Denies abdominal pain, nausea, or vomiting, no loose stools, no reflux  Integument:  Denies rash , no open areas  Neurologic:  Denies headache or focal weakness, no dizziness  : no dysuria, or hematuria      Current Outpatient Medications   Medication Sig Dispense Refill    albuterol (Proventil HFA) 90 mcg/act inhaler Inhale 2 puffs every 6 (six) hours as needed for wheezing 6 7 g 0    amLODIPine (NORVASC) 5 mg tablet Take 1 tablet (5 mg total) by mouth daily 90 tablet 1    Biotin w/ Vitamins C & E (HAIR/SKIN/NAILS PO) Take by mouth daily at bedtime      Calcium Carbonate-Vitamin D 600-200 MG-UNIT TABS Take 1 tablet by mouth daily      clindamycin (CLEOCIN T) 1 % lotion Apply 1 application topically 2 (two) times a day as needed (ezema) 60 mL 1    DAILY MULTIPLE VITAMINS PO Take 1 tablet by mouth daily       ergocalciferol (VITAMIN D2) 50,000 units Take 1 capsule (50,000 Units total) by mouth every 14 (fourteen) days 6 capsule 1    fluticasone (FLONASE) 50 mcg/act nasal spray 2 sprays into each nostril daily      hydrochlorothiazide (HYDRODIURIL) 12 5 mg tablet Take 1 tablet (12 5 mg total) by mouth daily 90 tablet 1    IRON, FERROUS GLUCONATE, PO Take 1 tablet by mouth every other day      levonorgestrel (KYLEENA) 19 5 MG intrauterine device 1 Intra Uterine Device by Intrauterine route once      nystatin-triamcinolone (MYCOLOG-II) ointment APPLY TOPICALLY 2 (TWO) TIMES A DAY (Patient taking differently: Apply topically 2 (two) times a day as needed) 60 g 0    Probiotic Product (DIGESTIVE ADVANTAGE PO) Take 1 capsule by mouth every other day       No current facility-administered medications for this visit         Objective:    /86 (BP Location: Left arm, Patient Position: Sitting, Cuff Size: Large)   Pulse 80   Temp 99 2 °F (37 3 °C) (Temporal)   Ht 5' 5" (1 651 m)   Wt 133 kg (293 lb)   SpO2 98% Comment: ra  BMI 48 76 kg/m²        Physical Exam         Constitutional:  Well developed, well nourished, no acute distress, non-toxic appearance   Eyes:  PERRL, conjunctiva normal , non icteric sclera  HENT:  Atraumatic, oropharynx moist  Neck-  supple   Respiratory:  CTA b/l, normal breath sounds, no rales, no wheezing   Cardiovascular:  RRR, no murmurs, no LE edema b/l  GI:  Soft, nondistended, normal bowel sounds x 4, nontender, no organomegaly, no mass, no rebound, no guarding   Neurologic:  no focal deficits noted   Psychiatric:  Speech and behavior appropriate , AAO x 3        Taco Burgess, DO

## 2022-10-03 DIAGNOSIS — I10 ESSENTIAL HYPERTENSION: ICD-10-CM

## 2022-10-03 RX ORDER — HYDROCHLOROTHIAZIDE 12.5 MG/1
12.5 TABLET ORAL DAILY
Qty: 90 TABLET | Refills: 1 | Status: SHIPPED | OUTPATIENT
Start: 2022-10-03

## 2022-10-24 ENCOUNTER — IMMUNIZATIONS (OUTPATIENT)
Dept: INTERNAL MEDICINE CLINIC | Age: 22
End: 2022-10-24
Payer: COMMERCIAL

## 2022-10-24 DIAGNOSIS — Z23 NEED FOR INFLUENZA VACCINATION: Primary | ICD-10-CM

## 2022-10-24 PROCEDURE — 90471 IMMUNIZATION ADMIN: CPT

## 2022-10-24 PROCEDURE — 90686 IIV4 VACC NO PRSV 0.5 ML IM: CPT

## 2022-11-14 ENCOUNTER — HOSPITAL ENCOUNTER (OUTPATIENT)
Dept: NON INVASIVE DIAGNOSTICS | Facility: CLINIC | Age: 22
Discharge: HOME | End: 2022-11-14

## 2022-11-14 VITALS
BODY MASS INDEX: 48.82 KG/M2 | SYSTOLIC BLOOD PRESSURE: 140 MMHG | HEART RATE: 86 BPM | HEIGHT: 65 IN | WEIGHT: 293 LBS | OXYGEN SATURATION: 99 % | DIASTOLIC BLOOD PRESSURE: 82 MMHG

## 2022-11-14 DIAGNOSIS — S20.312A ABRASION OF LEFT CHEST WALL, INITIAL ENCOUNTER: ICD-10-CM

## 2022-11-14 LAB
BASELINE ST DEPRESSION: 0 MM
MAX HR PERCENT: 88 %
MAX HR: 176 BPM
RATE PRESSURE PRODUCT: NORMAL
SL CV STRESS RECOVERY BP: NORMAL MMHG
SL CV STRESS RECOVERY HR: 103 BPM
SL CV STRESS RECOVERY O2 SAT: 97 %
SL CV STRESS STAGE REACHED: 3
STRESS ANGINA INDEX: 0
STRESS BASELINE BP: NORMAL MMHG
STRESS BASELINE HR: 86 BPM
STRESS DUKE TREADMILL SCORE: 9
STRESS O2 SAT REST: 99 %
STRESS PEAK HR: 176 BPM
STRESS POST ESTIMATED WORKLOAD: 10.4 METS
STRESS POST EXERCISE DUR MIN: 8 MIN
STRESS POST EXERCISE DUR SEC: 30 SEC
STRESS POST O2 SAT PEAK: 97 %
STRESS POST PEAK BP: 150 MMHG
STRESS ST DEPRESSION: 0 MM

## 2022-11-16 LAB
CHEST PAIN STATEMENT: NORMAL
MAX DIASTOLIC BP: 72 MMHG
MAX HEART RATE: 176 BPM
MAX PREDICTED HEART RATE: 198 BPM
MAX. SYSTOLIC BP: 154 MMHG
PROTOCOL NAME: NORMAL
REASON FOR TERMINATION: NORMAL
TARGET HR FORMULA: NORMAL
TEST INDICATION: NORMAL
TIME IN EXERCISE PHASE: NORMAL

## 2023-01-04 ENCOUNTER — OFFICE VISIT (OUTPATIENT)
Dept: INTERNAL MEDICINE CLINIC | Age: 23
End: 2023-01-04

## 2023-01-04 VITALS
DIASTOLIC BLOOD PRESSURE: 88 MMHG | WEIGHT: 293 LBS | TEMPERATURE: 99.5 F | HEART RATE: 96 BPM | OXYGEN SATURATION: 98 % | HEIGHT: 65 IN | SYSTOLIC BLOOD PRESSURE: 132 MMHG | BODY MASS INDEX: 48.82 KG/M2

## 2023-01-04 DIAGNOSIS — M25.561 ACUTE PAIN OF RIGHT KNEE: Primary | ICD-10-CM

## 2023-01-04 DIAGNOSIS — L70.0 ACNE VULGARIS: ICD-10-CM

## 2023-01-04 RX ORDER — MELOXICAM 15 MG/1
15 TABLET ORAL DAILY
Qty: 10 TABLET | Refills: 0 | Status: SHIPPED | OUTPATIENT
Start: 2023-01-04

## 2023-01-04 RX ORDER — CLINDAMYCIN PHOSPHATE 10 UG/ML
1 LOTION TOPICAL 2 TIMES DAILY PRN
Qty: 60 ML | Refills: 2 | Status: SHIPPED | OUTPATIENT
Start: 2023-01-04

## 2023-01-04 NOTE — PROGRESS NOTES
Assessment/Plan:         Diagnoses and all orders for this visit:    Acute pain of right knee  Comments:  heat affected area  check xray   avoid dancing at this time / squats, deep knee bends  short course mobic - take with food   Orders:  -     XR knee 4+ vw right injury; Future  -     meloxicam (Mobic) 15 mg tablet; Take 1 tablet (15 mg total) by mouth daily    Acne vulgaris  -     clindamycin (CLEOCIN T) 1 % lotion; Apply 1 application topically 2 (two) times a day as needed (ezema)          Subjective:      Patient ID: Ness Arciniega is a 25 y o  female  24 y/o female with c/o R knee pain x 2 weeks  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 the day prior to this happening (2 weeks ago)  Pt reports soreness on outer aspect of knee   Pt has been taking motrin ATC for 3 days which helped       Knee Pain   The incident occurred more than 1 week ago  The incident occurred at the gym  The injury mechanism is unknown  The pain is present in the right knee  The quality of the pain is described as aching  The pain is at a severity of 5/10  The pain is mild  The pain has been intermittent since onset  Pertinent negatives include no numbness or tingling  The symptoms are aggravated by movement  She has tried NSAIDs for the symptoms  The treatment provided mild relief  The following portions of the patient's history were reviewed and updated as appropriate: allergies, current medications, past family history, past medical history, past social history, past surgical history and problem list     Review of Systems   Constitutional: Negative for activity change, appetite change, chills, diaphoresis, fatigue and fever  HENT: Negative for congestion and sore throat  Eyes: Negative for pain and visual disturbance  Respiratory: Negative for cough and shortness of breath  Cardiovascular: Negative for chest pain and leg swelling     Musculoskeletal: Positive for arthralgias and gait problem  Negative for back pain  Skin: Negative for rash  Neurological: Negative for tingling and numbness  Psychiatric/Behavioral: Negative for sleep disturbance  The patient is not nervous/anxious            Past Medical History:   Diagnosis Date   • Acne     resolved-12/3/2015   • Eczema     last assessed-12/3/2015   • Gastroesophageal reflux disease without esophagitis 6/28/2019   • Hypertension     last assessed-12/11/2017   • Iron deficiency anemia 5/21/2019   • Pneumonia 11/4/2019   • Subluxation of left patella     last assessed-3/31/2015         Current Outpatient Medications:   •  albuterol (Proventil HFA) 90 mcg/act inhaler, Inhale 2 puffs every 6 (six) hours as needed for wheezing, Disp: 6 7 g, Rfl: 0  •  amLODIPine (NORVASC) 5 mg tablet, Take 1 tablet (5 mg total) by mouth daily, Disp: 90 tablet, Rfl: 1  •  Biotin w/ Vitamins C & E (HAIR/SKIN/NAILS PO), Take by mouth daily at bedtime, Disp: , Rfl:   •  Calcium Carbonate-Vitamin D 600-200 MG-UNIT TABS, Take 1 tablet by mouth daily, Disp: , Rfl:   •  clindamycin (CLEOCIN T) 1 % lotion, Apply 1 application topically 2 (two) times a day as needed (ezema), Disp: 60 mL, Rfl: 2  •  DAILY MULTIPLE VITAMINS PO, Take 1 tablet by mouth daily , Disp: , Rfl:   •  ergocalciferol (VITAMIN D2) 50,000 units, Take 1 capsule (50,000 Units total) by mouth every 14 (fourteen) days, Disp: 6 capsule, Rfl: 1  •  fluticasone (FLONASE) 50 mcg/act nasal spray, 2 sprays into each nostril daily as needed, Disp: , Rfl:   •  hydrochlorothiazide (HYDRODIURIL) 12 5 mg tablet, Take 1 tablet (12 5 mg total) by mouth daily, Disp: 90 tablet, Rfl: 1  •  IRON, FERROUS GLUCONATE, PO, Take 1 tablet by mouth every other day, Disp: , Rfl:   •  levonorgestrel (KYLEENA) 19 5 MG intrauterine device, 1 Intra Uterine Device by Intrauterine route once, Disp: , Rfl:   •  meloxicam (Mobic) 15 mg tablet, Take 1 tablet (15 mg total) by mouth daily, Disp: 10 tablet, Rfl: 0  •  nystatin-triamcinolone (MYCOLOG-II) ointment, APPLY TOPICALLY 2 (TWO) TIMES A DAY (Patient taking differently: Apply topically 2 (two) times a day as needed), Disp: 60 g, Rfl: 0  •  Probiotic Product (DIGESTIVE ADVANTAGE PO), Take 1 capsule by mouth every other day, Disp: , Rfl:     Allergies   Allergen Reactions   • Cefdinir Hives       Social History   Past Surgical History:   Procedure Laterality Date   • TONSILLECTOMY     • WISDOM TOOTH EXTRACTION  03/2020     Family History   Problem Relation Age of Onset   • Allergic rhinitis Mother    • Hypertension Mother    • Diabetes Mother    • Thyroid disease Maternal Grandmother    • Dementia Maternal Grandmother    • COPD Maternal Grandfather    • Asthma Maternal Grandfather    • Atrial fibrillation Maternal Grandfather    • Cancer Other    • Diabetes Other        Objective:  /88 (BP Location: Left arm, Patient Position: Sitting, Cuff Size: Large)   Pulse 96   Temp 99 5 °F (37 5 °C) (Temporal)   Ht 5' 5" (1 651 m)   Wt 134 kg (296 lb)   SpO2 98% Comment: ROOM AIR  BMI 49 26 kg/m²        Physical Exam  Vitals reviewed  Constitutional:       Appearance: She is obese  HENT:      Head: Normocephalic and atraumatic  Cardiovascular:      Rate and Rhythm: Normal rate and regular rhythm  Pulmonary:      Effort: Pulmonary effort is normal  No respiratory distress  Breath sounds: Normal breath sounds  Musculoskeletal:         General: Tenderness (R lateral knee - no joint laxity, negative anterior drawer sign) present  No deformity  Right lower leg: No edema  Left lower leg: No edema  Skin:     General: Skin is warm  Findings: No erythema or rash  Neurological:      Mental Status: She is alert

## 2023-01-07 ENCOUNTER — APPOINTMENT (OUTPATIENT)
Dept: RADIOLOGY | Age: 23
End: 2023-01-07

## 2023-01-07 DIAGNOSIS — M25.561 ACUTE PAIN OF RIGHT KNEE: ICD-10-CM

## 2023-01-30 DIAGNOSIS — E55.9 VITAMIN D DEFICIENCY: ICD-10-CM

## 2023-01-30 NOTE — TELEPHONE ENCOUNTER
Medication Refill Request     Name ergocalciferol (VITAMIN D2) 50,000 units  Dose/Frequency Take 1 capsule (50,000 Units total) by mouth every 14 (fourteen) days  Quantity 6  Verified pharmacy   [x]  Verified ordering Provider   [x]  Does patient have enough for the next 3 days?  Yes [x] No []

## 2023-01-31 RX ORDER — ERGOCALCIFEROL 1.25 MG/1
50000 CAPSULE ORAL
Qty: 6 CAPSULE | Refills: 1 | Status: SHIPPED | OUTPATIENT
Start: 2023-01-31

## 2023-03-24 ENCOUNTER — OFFICE VISIT (OUTPATIENT)
Dept: INTERNAL MEDICINE CLINIC | Facility: OTHER | Age: 23
End: 2023-03-24

## 2023-03-24 ENCOUNTER — TELEPHONE (OUTPATIENT)
Dept: INTERNAL MEDICINE CLINIC | Age: 23
End: 2023-03-24

## 2023-03-24 VITALS
BODY MASS INDEX: 50.02 KG/M2 | WEIGHT: 293 LBS | HEART RATE: 81 BPM | OXYGEN SATURATION: 98 % | DIASTOLIC BLOOD PRESSURE: 70 MMHG | SYSTOLIC BLOOD PRESSURE: 128 MMHG | HEIGHT: 64 IN | TEMPERATURE: 97 F

## 2023-03-24 DIAGNOSIS — I10 PRIMARY HYPERTENSION: ICD-10-CM

## 2023-03-24 DIAGNOSIS — E55.9 VITAMIN D DEFICIENCY: Primary | ICD-10-CM

## 2023-03-24 DIAGNOSIS — Z13.220 SCREENING CHOLESTEROL LEVEL: ICD-10-CM

## 2023-03-24 DIAGNOSIS — Z00.00 ANNUAL PHYSICAL EXAM: ICD-10-CM

## 2023-03-24 NOTE — PROGRESS NOTES
Assessment/Plan:    1  Vitamin D deficiency  -     Vitamin D 25 hydroxy; Future    2  Primary hypertension  -     Comprehensive metabolic panel; Future  -     CBC and differential; Future  -     TSH, 3rd generation with Free T4 reflex; Future  -     semaglutide, 0 25 or 0 5 mg/dose, (Ozempic) 2 mg/1 5 mL injection pen; Inject 0 19 mL (0 25 mg total) under the skin every 7 days for 28 days, THEN 0 38 mL (0 5 mg total) every 7 days  3  Annual physical exam    4  BMI 45 0-49 9, adult (HCC)  -     TSH, 3rd generation with Free T4 reflex; Future  -     semaglutide, 0 25 or 0 5 mg/dose, (Ozempic) 2 mg/1 5 mL injection pen; Inject 0 19 mL (0 25 mg total) under the skin every 7 days for 28 days, THEN 0 38 mL (0 5 mg total) every 7 days  5  Screening cholesterol level  -     Lipid Panel with Direct LDL reflex; Future      BMI Counseling: Body mass index is 51 63 kg/m²  The BMI is above normal  Nutrition recommendations include moderation in carbohydrate intake  Exercise recommendations include exercising 3-5 times per week  No pharmacotherapy was ordered  Rationale for BMI follow-up plan is due to patient being overweight or obese  There are no Patient Instructions on file for this visit  Return in about 6 months (around 9/24/2023) for Recheck  Subjective:      Patient ID: Quita Velazco is a 25 y o  female  Chief Complaint   Patient presents with   • Physical Exam     PT is here for a physical   Last physical was 3/9/2022   • Follow-up     Pt is here for a 6M follow up  HPI      Adult Annual Physical   Patient here for a comprehensive physical exam  The patient reports no problems  Diet and Physical Activity  · Diet/Nutrition: well balanced diet  · Exercise: 5-7 times a week on average  Depression Screening  PHQ-9 Depression Screening    PHQ-9:    Frequency of the following problems over the past two weeks:            General Health  · Sleep: sleeps well     · Hearing: normal - bilateral   · Vision: no vision problems  · Dental: regular dental visits         /GYN Health  · Patient is: premenopausal  · Last menstrual period: irregular from IUD  · Contraceptive method: IUD placement      The following portions of the patient's history were reviewed and updated as appropriate: allergies, current medications, past family history, past medical history, past social history, past surgical history and problem list     Review of Systems      Constitutional:  Denies fever or chills , + weight gain since last visit   Eyes:  Denies double , blurry vision or eye pain  HENT:  Denies nasal congestion, sore throat or new hearing issues  Respiratory:  Denies cough or shortness of breath or wheezing  Cardiovascular:  Denies palpitations or chest pain  GI:  Denies abdominal pain, nausea, or vomiting, no loose stools, no reflux  Integument:  Denies rash , no open areas  Neurologic:  Denies headache or focal weakness, no dizziness  : no dysuria, or hematuria      Current Outpatient Medications   Medication Sig Dispense Refill   • albuterol (Proventil HFA) 90 mcg/act inhaler Inhale 2 puffs every 6 (six) hours as needed for wheezing 6 7 g 0   • amLODIPine (NORVASC) 5 mg tablet Take 1 tablet (5 mg total) by mouth daily 90 tablet 1   • Biotin w/ Vitamins C & E (HAIR/SKIN/NAILS PO) Take by mouth daily at bedtime     • Calcium Carbonate-Vitamin D 600-200 MG-UNIT TABS Take 1 tablet by mouth daily     • clindamycin (CLEOCIN T) 1 % lotion Apply 1 application topically 2 (two) times a day as needed (ezema) 60 mL 2   • DAILY MULTIPLE VITAMINS PO Take 1 tablet by mouth daily      • ergocalciferol (VITAMIN D2) 50,000 units Take 1 capsule (50,000 Units total) by mouth every 14 (fourteen) days 6 capsule 1   • fluticasone (FLONASE) 50 mcg/act nasal spray 2 sprays into each nostril daily as needed     • hydrochlorothiazide (HYDRODIURIL) 12 5 mg tablet Take 1 tablet (12 5 mg total) by mouth daily 90 tablet 1   • IRON, FERROUS GLUCONATE, PO Take 1 tablet by mouth every other day     • levonorgestrel (KYLEENA) 19 5 MG intrauterine device 1 Intra Uterine Device by Intrauterine route once     • Probiotic Product (DIGESTIVE ADVANTAGE PO) Take 1 capsule by mouth every other day     • semaglutide, 0 25 or 0 5 mg/dose, (Ozempic) 2 mg/1 5 mL injection pen Inject 0 19 mL (0 25 mg total) under the skin every 7 days for 28 days, THEN 0 38 mL (0 5 mg total) every 7 days  4 5 mL 0     No current facility-administered medications for this visit         Objective:    /70 (BP Location: Left arm, Patient Position: Sitting, Cuff Size: Large)   Pulse 81   Temp (!) 97 °F (36 1 °C) (Tympanic)   Ht 5' 4" (1 626 m)   Wt (!) 136 kg (300 lb 12 8 oz)   SpO2 98% Comment: RA  BMI 51 63 kg/m²        Physical Exam       Constitutional:  Well developed, well nourished, no acute distress, non-toxic appearance   Eyes:  PERRL, conjunctiva normal , non icteric sclera  HENT:  Atraumatic, oropharynx moist  Neck-  supple   Respiratory:  CTA b/l, normal breath sounds, no rales, no wheezing   Cardiovascular:  RRR, no murmurs, no LE edema b/l  GI:  Soft, nondistended, normal bowel sounds x 4, nontender, no organomegaly, no mass, no rebound, no guarding   Neurologic:  no focal deficits noted   Psychiatric:  Speech and behavior appropriate , AAO x 3        Ananda Burows, DO

## 2023-03-24 NOTE — PROGRESS NOTES
Assessment/Plan:    1  Vitamin D deficiency  -     Vitamin D 25 hydroxy; Future    2  Primary hypertension  -     Comprehensive metabolic panel; Future  -     CBC and differential; Future  -     TSH, 3rd generation with Free T4 reflex; Future  -     semaglutide, 0 25 or 0 5 mg/dose, (Ozempic) 2 mg/1 5 mL injection pen; Inject 0 19 mL (0 25 mg total) under the skin every 7 days for 28 days, THEN 0 38 mL (0 5 mg total) every 7 days  3  Annual physical exam    4  BMI 45 0-49 9, adult (HCC)  -     TSH, 3rd generation with Free T4 reflex; Future  -     semaglutide, 0 25 or 0 5 mg/dose, (Ozempic) 2 mg/1 5 mL injection pen; Inject 0 19 mL (0 25 mg total) under the skin every 7 days for 28 days, THEN 0 38 mL (0 5 mg total) every 7 days  5  Screening cholesterol level  -     Lipid Panel with Direct LDL reflex; Future            There are no Patient Instructions on file for this visit  Return in about 6 months (around 9/24/2023) for Recheck  Subjective:      Patient ID: Nga Zhou is a 25 y o  female  Chief Complaint   Patient presents with   • Physical Exam     PT is here for a physical   Last physical was 3/9/2022   • Follow-up     Pt is here for a 6M follow up  HPI     Hypertension, used to follow up with a pediatric cardiologist for hypertension but recently is now out of the age range  Anthony Santaing to discuss changing medications due to risk of fetal side effects  Prosper Cline is not currently sexually active and has no plans of conceiving but would like to stay on the safe side  Lois Sun[place] blood pressures have been well controlled and she was diagnosed at the age of 6  She does not know the details of that and old records are not available from [facility]  Prosper Cline does not check her blood pressure at home but occasionally does when she goes to the store and is well controlled  July 2019, was seen for a routine physical in June and blood pressure was elevated   At that time methyldopa was increased from b i d   To t i d  However she states that she often forgets the middle dose and has been routinely taking it b i d     Blood pressure today is is well controlled   She states home blood pressures are well controlled   Tolerating it well January 2020, compliant with medication and tolerating it well   Home blood pressure readings are not checked   Is 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  August 2020, has been working 3 jobs and will soon start her 27 year in college   Lost 10 lb but is not quite sure how  Betina Walker remains very active and is taking her medications and is compliant  April 2021, blood pressure medications were changed from methyldopa to amlodipine about 1 month ago  Betina Walker has been tracking her blood pressures which have been okay but occasionally she has elevated diastolic blood pressures   She is tolerating the medication without any problems and has not gained weight   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  Betina Walker also works in [employer] as a  so she is always moving   She states she does not have a high salt diet but eats lunch me almost every day and sometimes uses the packaged sausages    May 2021, new medication hydrochlorothiazide started 6 weeks ago   Significant improvement in blood pressure however home readings still elevated but better in general   She did not bring in her blood pressure machine today to have it checked   She feels well and has no side effects with the new medication  Betina Walker is taking hydrochlorothiazide and 5 mg of amlodipine daily   August 2021, blood pressure very well controlled   Taking her medications and has not really changed anything in her diet   No side effects and she is compliant  March 2022, blood pressure well controlled   Has slight intermittent ankle edema with medications but has been doing more dancing at school on thought it may be from that  Betina Walker is compliant   Last echocardiogram was May 2021 which was normal    September 2022, compliant with medications  Has gained slight weight  No lower extremity edema but states with long car rides when she goes to visit relatives do exacerbate this  She is not checking blood pressure at home  March 2023, blood pressure well controlled, has minimal ankle edema with long car rides and standing on her feet all day long or if she sitting all day at work         Vitamin-D deficiency, last check March 2018, level is 24  Taking over-the-counter vitamin-D daily   July 2019, taking weekly vitamin-D and tolerating well   January 2020, levels of finally improved to 37 8   Taking vitamin-D as directed  Carter Sensing  August 2020, taking weekly vitamin-D without any issues   Had recent lab work done  April 2021, takes 67478 International Units every 2 weeks  James Feliz is due for lab work   Will Barthel 2022, taking 47028 International Units every 2 weeks and is compliant   Due for lab work    September 2022, due for lab work  March 2023, compliant with every other week vitamin D and due for blood work next September    The following portions of the patient's history were reviewed and updated as appropriate: allergies, current medications, past family history, past medical history, past social history, past surgical history and problem list     Review of Systems      Constitutional:  Denies fever or chills , weight gain since last visit  Eyes:  Denies double , blurry vision or eye pain  HENT:  Denies nasal congestion, sore throat or new hearing issues, having  postnasal drip  Respiratory:  Denies cough or shortness of breath or wheezing  Cardiovascular:  Denies palpitations or chest pain  GI:  Denies abdominal pain, nausea, or vomiting, no loose stools, no reflux  Integument:  Denies rash , no open areas  Neurologic:  Denies headache or focal weakness, no dizziness  : no dysuria, or hematuria      Current Outpatient Medications   Medication Sig Dispense Refill   • albuterol (Proventil HFA) 90 mcg/act inhaler Inhale 2 puffs every 6 (six) hours as needed for wheezing 6 7 g 0   • amLODIPine (NORVASC) 5 mg tablet Take 1 tablet (5 mg total) by mouth daily 90 tablet 1   • Biotin w/ Vitamins C & E (HAIR/SKIN/NAILS PO) Take by mouth daily at bedtime     • Calcium Carbonate-Vitamin D 600-200 MG-UNIT TABS Take 1 tablet by mouth daily     • clindamycin (CLEOCIN T) 1 % lotion Apply 1 application topically 2 (two) times a day as needed (ezema) 60 mL 2   • DAILY MULTIPLE VITAMINS PO Take 1 tablet by mouth daily      • ergocalciferol (VITAMIN D2) 50,000 units Take 1 capsule (50,000 Units total) by mouth every 14 (fourteen) days 6 capsule 1   • fluticasone (FLONASE) 50 mcg/act nasal spray 2 sprays into each nostril daily as needed     • hydrochlorothiazide (HYDRODIURIL) 12 5 mg tablet Take 1 tablet (12 5 mg total) by mouth daily 90 tablet 1   • IRON, FERROUS GLUCONATE, PO Take 1 tablet by mouth every other day     • levonorgestrel (KYLEENA) 19 5 MG intrauterine device 1 Intra Uterine Device by Intrauterine route once     • Probiotic Product (DIGESTIVE ADVANTAGE PO) Take 1 capsule by mouth every other day     • semaglutide, 0 25 or 0 5 mg/dose, (Ozempic) 2 mg/1 5 mL injection pen Inject 0 19 mL (0 25 mg total) under the skin every 7 days for 28 days, THEN 0 38 mL (0 5 mg total) every 7 days  4 5 mL 0     No current facility-administered medications for this visit         Objective:    /70 (BP Location: Left arm, Patient Position: Sitting, Cuff Size: Large)   Pulse 81   Temp (!) 97 °F (36 1 °C) (Tympanic)   Ht 5' 4" (1 626 m)   Wt (!) 136 kg (300 lb 12 8 oz)   SpO2 98% Comment: RA  BMI 51 63 kg/m²        Physical Exam         Constitutional:  Well developed, well nourished, no acute distress, non-toxic appearance   Eyes:  PERRL, conjunctiva normal , non icteric sclera  HENT:  Atraumatic, oropharynx moist  Neck-  supple   Respiratory:  CTA b/l, normal breath sounds, no rales, no wheezing   Cardiovascular:  RRR, no murmurs, no LE edema b/l  GI:  Soft, nondistended, normal bowel sounds x 4, nontender, no organomegaly, no mass, no rebound, no guarding   Neurologic:  no focal deficits noted   Psychiatric:  Speech and behavior appropriate , AAO x 3      Janette Antoine, DO

## 2023-03-28 NOTE — TELEPHONE ENCOUNTER
Prior [place] Dora was denied- Spoke with patient, she is going to try and apply for the Ozempic coupon card online   She will let us know if she not able to get

## 2023-03-31 ENCOUNTER — ANNUAL EXAM (OUTPATIENT)
Dept: OBGYN CLINIC | Facility: MEDICAL CENTER | Age: 23
End: 2023-03-31

## 2023-03-31 VITALS
SYSTOLIC BLOOD PRESSURE: 142 MMHG | HEIGHT: 64 IN | WEIGHT: 293 LBS | DIASTOLIC BLOOD PRESSURE: 94 MMHG | BODY MASS INDEX: 50.02 KG/M2

## 2023-03-31 DIAGNOSIS — Z11.3 SCREENING FOR STD (SEXUALLY TRANSMITTED DISEASE): ICD-10-CM

## 2023-03-31 DIAGNOSIS — Z30.431 IUD CHECK UP: ICD-10-CM

## 2023-03-31 DIAGNOSIS — Z01.411 ENCNTR FOR GYN EXAM (GENERAL) (ROUTINE) W ABNORMAL FINDINGS: Primary | ICD-10-CM

## 2023-03-31 DIAGNOSIS — N90.89 VULVAR LESION: ICD-10-CM

## 2023-03-31 NOTE — PROGRESS NOTES
Assessment/Plan:  Follow up with PCP for HTN management  Pap every 3 years if normal Due 2025  STI testing as indicated  GC/CT collected  Exercise most days of week-minimum of 150 minutes per week  Weight loss encouraged  If referral to weight loss program is desired, let me know  Obtain appropriate diet and hydration  Calcium 1000 mg (in divided doses-max 600 mg at one time) + 600 vit D daily  Check IUD string monthly after menses  Patricia Derek expires 8/20/2024   Condom use when sexually active for sexually transmitted infection prevention  HPV 9 vaccine series completed  Annual mammogram starting at age 36, monthly breast self exam recommended  Use clindamycin lotion (already has) to right labia majora vulvar lesion twice daily x 7 days  Return to office in one year or sooner, if needed  1  Encntr for gyn exam (general) (routine) w abnormal findings    2  Screening for STD (sexually transmitted disease)  -     Chlamydia/GC amplified DNA by PCR    3  Vulvar lesion    4  IUD check up    5  BMI 50 0-59 9, adult ([facility])               Subjective:      Patient ID: Philippe Mejia is a 21 y o  female  HPI    Philippe Mejia is a 21 y o  [place] female who is here today for her annual visit  No gynecologic health concerns  Hx of HTN with elevated BP today  Just took her medication prior to visit  Had normal PCP follow up last week  Irregular spotting with kyleena IUD  Exercise- 4 nights per week  Dance teacher 4 nights per week  She works for [employer]  Philippe Mejia is sexually active with male partner/  of 8 months  Usually uses a condom  Monogamous and feels safe in this relationship  Denies vaginal pain,bleeding or dryness  She uses Patricia Derek IUD  for contraception since 8/21/19  She is interested in STD screening today  She denies vaginal discharge, itching or pelvic pain  She has no  urinary concerns, does not have incontinence    No bowel "concerns  No breast concerns  Last pap: 3/9/22 normal  DEXA scan: N/A  Mammogram: N/A   Colonoscopy: N/A  HPV vaccine series:Completed  The following portions of the patient's history were reviewed and updated as appropriate: allergies, current medications, past family history, past medical history, past social history, past surgical history and problem list     Review of Systems   Constitutional: Negative  Negative for activity change, appetite change, chills, diaphoresis, fatigue, fever and unexpected weight change  HENT: Negative for congestion, dental problem, sneezing, sore throat and trouble swallowing  Eyes: Negative for visual disturbance  Respiratory: Negative for chest tightness and shortness of breath  Cardiovascular: Negative for chest pain and leg swelling  Gastrointestinal: Negative for abdominal pain, constipation, diarrhea, nausea and vomiting  Genitourinary: Positive for menstrual problem  Negative for difficulty urinating, dyspareunia, dysuria, frequency, hematuria, pelvic pain, urgency, vaginal bleeding, vaginal discharge and vaginal pain  Musculoskeletal: Negative for back pain and neck pain  Skin: Negative  Allergic/Immunologic: Negative  Neurological: Negative for weakness and headaches  Hematological: Negative for adenopathy  Psychiatric/Behavioral: Negative  Objective:      /94 (BP Location: Left arm, Patient Position: Sitting, Cuff Size: Large)   Ht 5' 3 75\" (1 619 m)   Wt (!) 137 kg (302 lb 6 4 oz)   BMI 52 31 kg/m²          Physical Exam  Vitals and nursing note reviewed  Constitutional:       Appearance: Normal appearance  She is well-developed  She is obese  HENT:      Head: Normocephalic and atraumatic  Eyes:      General:         Right eye: No discharge  Left eye: No discharge  Neck:      Thyroid: No thyromegaly  Trachea: Trachea normal    Cardiovascular:      Rate and Rhythm: Normal rate and regular rhythm   " Heart sounds: Normal heart sounds  Pulmonary:      Effort: Pulmonary effort is normal       Breath sounds: Normal breath sounds  Chest:   Breasts:     Breasts are symmetrical       Right: Normal  No inverted nipple, mass, nipple discharge, skin change or tenderness  Left: Normal  No inverted nipple, mass, nipple discharge, skin change or tenderness  Abdominal:      Palpations: Abdomen is soft  Genitourinary:     General: Normal vulva  Exam position: Lithotomy position  Labia:         Right: No rash, tenderness, lesion or injury  Left: No rash, tenderness, lesion or injury  Urethra: No prolapse, urethral pain, urethral swelling or urethral lesion  Vagina: Normal  No signs of injury and foreign body  No vaginal discharge, erythema, tenderness or bleeding  Cervix: Normal       Uterus: Normal        Adnexa:         Right: No mass, tenderness or fullness  Left: No mass, tenderness or fullness  Rectum: No external hemorrhoid  Comments: Small open skin lesion-states recurring  IUD string noted at os  Musculoskeletal:         General: Normal range of motion  Cervical back: Normal range of motion and neck supple  Lymphadenopathy:      Head:      Right side of head: No submental, submandibular or tonsillar adenopathy  Left side of head: No submental, submandibular or tonsillar adenopathy  Cervical: No cervical adenopathy  Upper Body:      Right upper body: No supraclavicular or axillary adenopathy  Left upper body: No supraclavicular or axillary adenopathy  Lower Body: No right inguinal adenopathy  No left inguinal adenopathy  Skin:     General: Skin is warm and dry  Neurological:      Mental Status: She is alert and oriented to person, place, and time     Psychiatric:         Mood and Affect: Mood normal          Behavior: Behavior normal

## 2023-03-31 NOTE — PATIENT INSTRUCTIONS
Pap every 3 years if normal Due 2025  STI testing as indicated  GC/CT collected  Exercise most days of week-minimum of 150 minutes per week  Weight loss encouraged  If referral to weight loss program is desired, let me know  Obtain appropriate diet and hydration  Calcium 1000 mg (in divided doses-max 600 mg at one time) + 600 vit D daily  Check IUD string monthly after menses  Chip Ahr expires 8/20/2024   Condom use when sexually active for sexually transmitted infection prevention  HPV 9 vaccine series completed  Annual mammogram starting at age 36, monthly breast self exam recommended  Use clindamycin lotion (already has) to right labia majora vulvar lesion twice daily x 7 days  Return to office in one year or sooner, if needed

## 2023-04-03 LAB
C TRACH DNA SPEC QL NAA+PROBE: NEGATIVE
N GONORRHOEA DNA SPEC QL NAA+PROBE: NEGATIVE

## 2023-05-23 DIAGNOSIS — I10 ESSENTIAL HYPERTENSION: ICD-10-CM

## 2023-05-24 RX ORDER — HYDROCHLOROTHIAZIDE 12.5 MG/1
12.5 TABLET ORAL DAILY
Qty: 90 TABLET | Refills: 1 | Status: SHIPPED | OUTPATIENT
Start: 2023-05-24

## 2023-06-16 ENCOUNTER — OFFICE VISIT (OUTPATIENT)
Dept: URGENT CARE | Facility: CLINIC | Age: 23
End: 2023-06-16
Payer: COMMERCIAL

## 2023-06-16 ENCOUNTER — HOSPITAL ENCOUNTER (EMERGENCY)
Facility: HOSPITAL | Age: 23
Discharge: HOME | End: 2023-06-16
Attending: EMERGENCY MEDICINE
Payer: COMMERCIAL

## 2023-06-16 ENCOUNTER — APPOINTMENT (EMERGENCY)
Dept: RADIOLOGY | Facility: HOSPITAL | Age: 23
End: 2023-06-16
Payer: COMMERCIAL

## 2023-06-16 VITALS
SYSTOLIC BLOOD PRESSURE: 162 MMHG | RESPIRATION RATE: 18 BRPM | DIASTOLIC BLOOD PRESSURE: 101 MMHG | HEART RATE: 87 BPM | WEIGHT: 290 LBS | TEMPERATURE: 98.5 F | BODY MASS INDEX: 49.78 KG/M2 | OXYGEN SATURATION: 99 %

## 2023-06-16 VITALS
SYSTOLIC BLOOD PRESSURE: 160 MMHG | DIASTOLIC BLOOD PRESSURE: 82 MMHG | HEART RATE: 95 BPM | HEIGHT: 64 IN | BODY MASS INDEX: 49.51 KG/M2 | RESPIRATION RATE: 16 BRPM | TEMPERATURE: 99.8 F | WEIGHT: 290 LBS | OXYGEN SATURATION: 100 %

## 2023-06-16 DIAGNOSIS — R42 EPISODIC LIGHTHEADEDNESS: ICD-10-CM

## 2023-06-16 DIAGNOSIS — R42 DIZZY: Primary | ICD-10-CM

## 2023-06-16 DIAGNOSIS — R07.89 NON-CARDIAC CHEST PAIN: Primary | ICD-10-CM

## 2023-06-16 LAB
ANION GAP SERPL CALCULATED.3IONS-SCNC: 11 MMOL/L (ref 4–13)
BASOPHILS # BLD AUTO: 0.07 THOUSANDS/ÂΜL (ref 0–0.1)
BASOPHILS NFR BLD AUTO: 1 % (ref 0–1)
BUN SERPL-MCNC: 9 MG/DL (ref 5–25)
CALCIUM SERPL-MCNC: 9.9 MG/DL (ref 8.4–10.2)
CARDIAC TROPONIN I PNL SERPL HS: <2 NG/L
CHLORIDE SERPL-SCNC: 101 MMOL/L (ref 96–108)
CO2 SERPL-SCNC: 23 MMOL/L (ref 21–32)
CREAT SERPL-MCNC: 0.54 MG/DL (ref 0.6–1.3)
D DIMER PPP FEU-MCNC: 0.35 UG/ML FEU
EOSINOPHIL # BLD AUTO: 0.18 THOUSAND/ÂΜL (ref 0–0.61)
EOSINOPHIL NFR BLD AUTO: 1 % (ref 0–6)
ERYTHROCYTE [DISTWIDTH] IN BLOOD BY AUTOMATED COUNT: 11.8 % (ref 11.6–15.1)
GFR SERPL CREATININE-BSD FRML MDRD: 133 ML/MIN/1.73SQ M
GLUCOSE SERPL-MCNC: 103 MG/DL (ref 65–140)
GLUCOSE SERPL-MCNC: 88 MG/DL (ref 65–140)
HCG SERPL QL: NEGATIVE
HCT VFR BLD AUTO: 42.5 % (ref 34.8–46.1)
HGB BLD-MCNC: 14.1 G/DL (ref 11.5–15.4)
IMM GRANULOCYTES # BLD AUTO: 0.06 THOUSAND/UL (ref 0–0.2)
IMM GRANULOCYTES NFR BLD AUTO: 0 % (ref 0–2)
LYMPHOCYTES # BLD AUTO: 3.38 THOUSANDS/ÂΜL (ref 0.6–4.47)
LYMPHOCYTES NFR BLD AUTO: 22 % (ref 14–44)
MCH RBC QN AUTO: 30.5 PG (ref 26.8–34.3)
MCHC RBC AUTO-ENTMCNC: 33.2 G/DL (ref 31.4–37.4)
MCV RBC AUTO: 92 FL (ref 82–98)
MONOCYTES # BLD AUTO: 0.9 THOUSAND/ÂΜL (ref 0.17–1.22)
MONOCYTES NFR BLD AUTO: 6 % (ref 4–12)
NEUTROPHILS # BLD AUTO: 10.61 THOUSANDS/ÂΜL (ref 1.85–7.62)
NEUTS SEG NFR BLD AUTO: 70 % (ref 43–75)
NRBC BLD AUTO-RTO: 0 /100 WBCS
PLATELET # BLD AUTO: 379 THOUSANDS/UL (ref 149–390)
PMV BLD AUTO: 9.3 FL (ref 8.9–12.7)
POTASSIUM SERPL-SCNC: 3.4 MMOL/L (ref 3.5–5.3)
RBC # BLD AUTO: 4.62 MILLION/UL (ref 3.81–5.12)
SL AMB POCT URINE HCG: NEGATIVE
SODIUM SERPL-SCNC: 135 MMOL/L (ref 135–147)
WBC # BLD AUTO: 15.2 THOUSAND/UL (ref 4.31–10.16)

## 2023-06-16 PROCEDURE — 82948 REAGENT STRIP/BLOOD GLUCOSE: CPT | Performed by: NURSE PRACTITIONER

## 2023-06-16 PROCEDURE — 93005 ELECTROCARDIOGRAM TRACING: CPT

## 2023-06-16 PROCEDURE — 81025 URINE PREGNANCY TEST: CPT | Performed by: NURSE PRACTITIONER

## 2023-06-16 PROCEDURE — 36415 COLL VENOUS BLD VENIPUNCTURE: CPT | Performed by: EMERGENCY MEDICINE

## 2023-06-16 PROCEDURE — 99285 EMERGENCY DEPT VISIT HI MDM: CPT

## 2023-06-16 PROCEDURE — 71045 X-RAY EXAM CHEST 1 VIEW: CPT

## 2023-06-16 PROCEDURE — 85379 FIBRIN DEGRADATION QUANT: CPT | Performed by: EMERGENCY MEDICINE

## 2023-06-16 PROCEDURE — 93005 ELECTROCARDIOGRAM TRACING: CPT | Performed by: NURSE PRACTITIONER

## 2023-06-16 PROCEDURE — 84703 CHORIONIC GONADOTROPIN ASSAY: CPT | Performed by: EMERGENCY MEDICINE

## 2023-06-16 PROCEDURE — 85025 COMPLETE CBC W/AUTO DIFF WBC: CPT | Performed by: EMERGENCY MEDICINE

## 2023-06-16 PROCEDURE — 99213 OFFICE O/P EST LOW 20 MIN: CPT | Performed by: NURSE PRACTITIONER

## 2023-06-16 PROCEDURE — 80048 BASIC METABOLIC PNL TOTAL CA: CPT | Performed by: EMERGENCY MEDICINE

## 2023-06-16 PROCEDURE — 96360 HYDRATION IV INFUSION INIT: CPT

## 2023-06-16 PROCEDURE — 84484 ASSAY OF TROPONIN QUANT: CPT | Performed by: EMERGENCY MEDICINE

## 2023-06-16 RX ORDER — SODIUM CHLORIDE 9 MG/ML
3 INJECTION INTRAVENOUS
Status: DISCONTINUED | OUTPATIENT
Start: 2023-06-16 | End: 2023-06-16 | Stop reason: HOSPADM

## 2023-06-16 RX ADMIN — SODIUM CHLORIDE 1000 ML: 0.9 INJECTION, SOLUTION INTRAVENOUS at 19:12

## 2023-06-16 NOTE — PROGRESS NOTES
"  [facility]        NAME: Anish Song is a 21 y o  female  : 2000    MRN: [id number]  DATE: 2023  TIME: 6:01 PM    Assessment and Plan   Dizzy [R42]  1  Dizzy  POCT urine HCG    ECG 12 lead            Patient Instructions       Proceed to  ER for further evaluation  Patient agreeable to plan of care  Friend to drive patient  Chief Complaint     Chief Complaint   Patient presents with   • Dizziness     Light headed , dizziness started this morning         History of Present Illness       Dizziness  This is a new problem  The current episode started today  The problem occurs intermittently  The problem has been unchanged  Associated symptoms include chest pain and nausea  Pertinent negatives include no abdominal pain, arthralgias, chills, coughing, diaphoresis, fatigue, fever, headaches, joint swelling, myalgias, numbness, rash, swollen glands, urinary symptoms, vertigo, vomiting or weakness  The symptoms are aggravated by standing  She has tried nothing for the symptoms  The treatment provided no relief  Patient states \"I feel like I'm going to pass out  \" denies any syncopal episodes  Does note intermittent palpitations  Denies hx of anxiety  Review of Systems   Review of Systems   Constitutional: Negative for chills, diaphoresis, fatigue and fever  HENT: Negative  Eyes: Negative for photophobia and visual disturbance  Respiratory: Positive for shortness of breath  Negative for cough, chest tightness, wheezing and stridor  Cardiovascular: Positive for chest pain and palpitations  Gastrointestinal: Positive for nausea  Negative for abdominal pain, diarrhea and vomiting  Genitourinary: Negative  Musculoskeletal: Negative for arthralgias, back pain, joint swelling and myalgias  Skin: Negative for rash  Neurological: Positive for dizziness and light-headedness  Negative for vertigo, tremors, seizures, syncope, speech difficulty, weakness, numbness and headaches   " Current Medications       Current Outpatient Medications:   •  albuterol (Proventil HFA) 90 mcg/act inhaler, Inhale 2 puffs every 6 (six) hours as needed for wheezing, Disp: 6 7 g, Rfl: 0  •  amLODIPine (NORVASC) 5 mg tablet, Take 1 tablet (5 mg total) by mouth daily, Disp: 90 tablet, Rfl: 1  •  Biotin w/ Vitamins C & E (HAIR/SKIN/NAILS PO), Take by mouth daily at bedtime, Disp: , Rfl:   •  Calcium Carbonate-Vitamin D 600-200 MG-UNIT TABS, Take 1 tablet by mouth daily, Disp: , Rfl:   •  clindamycin (CLEOCIN T) 1 % lotion, Apply 1 application topically 2 (two) times a day as needed (ezema), Disp: 60 mL, Rfl: 2  •  DAILY MULTIPLE VITAMINS PO, Take 1 tablet by mouth daily , Disp: , Rfl:   •  ergocalciferol (VITAMIN D2) 50,000 units, Take 1 capsule (50,000 Units total) by mouth every 14 (fourteen) days, Disp: 6 capsule, Rfl: 1  •  fluticasone (FLONASE) 50 mcg/act nasal spray, 2 sprays into each nostril daily as needed, Disp: , Rfl:   •  hydrochlorothiazide (HYDRODIURIL) 12 5 mg tablet, Take 1 tablet (12 5 mg total) by mouth daily, Disp: 90 tablet, Rfl: 1  •  IRON, FERROUS GLUCONATE, PO, Take 1 tablet by mouth every other day, Disp: , Rfl:   •  levonorgestrel (KYLEENA) 19 5 MG intrauterine device, 1 Intra Uterine Device by Intrauterine route once, Disp: , Rfl:   •  Probiotic Product (DIGESTIVE ADVANTAGE PO), Take 1 capsule by mouth every other day, Disp: , Rfl:   •  semaglutide, 0 25 or 0 5 mg/dose, (Ozempic) 2 mg/1 5 mL injection pen, Inject 0 19 mL (0 25 mg total) under the skin every 7 days for 28 days, THEN 0 38 mL (0 5 mg total) every 7 days  , Disp: 4 5 mL, Rfl: 0    Current Allergies     Allergies as of 06/16/2023 - Reviewed 06/16/2023   Allergen Reaction Noted   • Cefdinir Hives 02/18/2015            The following portions of the patient's history were reviewed and updated as appropriate: allergies, current medications, past family history, past medical history, past social history, past surgical history "and problem list      Past Medical History:   Diagnosis Date   • Acne     resolved-12/3/2015   • Eczema     last assessed-12/3/2015   • Gastroesophageal reflux disease without esophagitis 6/28/2019   • Hypertension     last assessed-12/11/2017   • Iron deficiency anemia 5/21/2019   • Pneumonia 11/4/2019   • Subluxation of left patella     last assessed-3/31/2015       Past Surgical History:   Procedure Laterality Date   • TONSILLECTOMY     • WISDOM TOOTH EXTRACTION  03/2020       Family History   Problem Relation Age of Onset   • Allergic rhinitis Mother    • Hypertension Mother    • Diabetes Mother    • Thyroid disease Maternal Grandmother    • Dementia Maternal Grandmother    • COPD Maternal Grandfather    • Asthma Maternal Grandfather    • Atrial fibrillation Maternal Grandfather    • Cancer Other    • Diabetes Other          Medications have been verified  Objective   /82   Pulse 95   Temp 99 8 °F (37 7 °C)   Resp 16   Ht 5' 4\" (1 626 m)   Wt 132 kg (290 lb)   SpO2 100%   BMI 49 78 kg/m²   No LMP recorded  Patient has had an implant  Physical Exam     Physical Exam  Constitutional:       General: She is not in acute distress  Appearance: Normal appearance  She is well-developed  She is not diaphoretic  Eyes:      Extraocular Movements: Extraocular movements intact  Pupils: Pupils are equal, round, and reactive to light  Cardiovascular:      Rate and Rhythm: Normal rate and regular rhythm  Heart sounds: Normal heart sounds  Pulmonary:      Effort: Pulmonary effort is normal       Breath sounds: Normal breath sounds  Abdominal:      General: Bowel sounds are normal  There is no distension  Palpations: Abdomen is soft  Abdomen is not rigid  There is no mass  Tenderness: There is no abdominal tenderness  There is no guarding or rebound  Negative signs include Rolle's sign and McBurney's sign  Skin:     General: Skin is warm and dry     Neurological:      " Mental Status: She is alert and oriented to person, place, and time  GCS: GCS eye subscore is 4  GCS verbal subscore is 5  GCS motor subscore is 6

## 2023-06-16 NOTE — PATIENT INSTRUCTIONS
Rest and drink plenty of fluids  Sit for a few minutes prior to standing up  Continue checking BP at home  If you develop any increased dizziness, chest pain, sob, you pass out, have a headache or any new or concerning symptoms please proceed to ER   Follow up with pcp in 3-5 days

## 2023-06-17 LAB
ATRIAL RATE: 75 BPM
ATRIAL RATE: 85 BPM
P AXIS: 35 DEGREES
P AXIS: 38 DEGREES
PR INTERVAL: 164 MS
PR INTERVAL: 170 MS
QRS AXIS: 31 DEGREES
QRS AXIS: 32 DEGREES
QRSD INTERVAL: 86 MS
QRSD INTERVAL: 86 MS
QT INTERVAL: 376 MS
QT INTERVAL: 388 MS
QTC INTERVAL: 433 MS
QTC INTERVAL: 447 MS
T WAVE AXIS: -1 DEGREES
T WAVE AXIS: 5 DEGREES
VENTRICULAR RATE: 75 BPM
VENTRICULAR RATE: 85 BPM

## 2023-06-17 PROCEDURE — 93010 ELECTROCARDIOGRAM REPORT: CPT | Performed by: INTERNAL MEDICINE

## 2023-06-19 ENCOUNTER — OFFICE VISIT (OUTPATIENT)
Dept: INTERNAL MEDICINE CLINIC | Facility: OTHER | Age: 23
End: 2023-06-19
Payer: COMMERCIAL

## 2023-06-19 VITALS
HEIGHT: 64 IN | TEMPERATURE: 99.4 F | HEART RATE: 82 BPM | BODY MASS INDEX: 50.02 KG/M2 | WEIGHT: 293 LBS | OXYGEN SATURATION: 100 % | DIASTOLIC BLOOD PRESSURE: 84 MMHG | RESPIRATION RATE: 18 BRPM | SYSTOLIC BLOOD PRESSURE: 154 MMHG

## 2023-06-19 DIAGNOSIS — I10 ESSENTIAL HYPERTENSION: ICD-10-CM

## 2023-06-19 DIAGNOSIS — E55.9 VITAMIN D DEFICIENCY: ICD-10-CM

## 2023-06-19 DIAGNOSIS — I10 PRIMARY HYPERTENSION: Primary | ICD-10-CM

## 2023-06-19 DIAGNOSIS — E87.6 HYPOKALEMIA: ICD-10-CM

## 2023-06-19 PROCEDURE — 99214 OFFICE O/P EST MOD 30 MIN: CPT | Performed by: INTERNAL MEDICINE

## 2023-06-19 RX ORDER — POTASSIUM CHLORIDE 20 MEQ/1
20 TABLET, EXTENDED RELEASE ORAL DAILY
Qty: 30 TABLET | Refills: 0 | Status: SHIPPED | OUTPATIENT
Start: 2023-06-19

## 2023-06-19 RX ORDER — HYDROCHLOROTHIAZIDE 25 MG/1
25 TABLET ORAL DAILY
Qty: 30 TABLET | Refills: 0 | Status: SHIPPED | OUTPATIENT
Start: 2023-06-19

## 2023-06-19 RX ORDER — VALACYCLOVIR HYDROCHLORIDE 1 G/1
TABLET, FILM COATED ORAL
COMMUNITY
Start: 2023-06-16

## 2023-06-19 RX ORDER — HYDROCHLOROTHIAZIDE 12.5 MG/1
25 TABLET ORAL DAILY
Qty: 90 TABLET | Refills: 1 | Status: SHIPPED | OUTPATIENT
Start: 2023-06-19 | End: 2023-06-19 | Stop reason: SDUPTHER

## 2023-06-19 RX ORDER — ERGOCALCIFEROL 1.25 MG/1
50000 CAPSULE ORAL
Qty: 6 CAPSULE | Refills: 1 | Status: SHIPPED | OUTPATIENT
Start: 2023-06-19

## 2023-06-19 NOTE — ASSESSMENT & PLAN NOTE
Elevated, previously controlled  She has been monitoring her blood pressures at home and reports average systolic readings are 663Z  Will increase hydrochlorothiazide from 12 5 to 25 mg daily  Continue Modicon 5 mg daily  We will check a BMP and magnesium in 2 weeks and follow-up in 2 weeks

## 2023-06-19 NOTE — PROGRESS NOTES
Assessment/Plan:    Hypertension  Elevated, previously controlled  She has been monitoring her blood pressures at home and reports average systolic readings are 629Y  Will increase hydrochlorothiazide from 12 5 to 25 mg daily  Continue Modicon 5 mg daily  We will check a BMP and magnesium in 2 weeks and follow-up in 2 weeks  Vitamin D deficiency  Continue vitamin D supplementation  Hypokalemia  We will start K-Dur 20 mill equivalents daily  Recheck magnesium and BMP in 2 weeks  Diagnoses and all orders for this visit:    Primary hypertension    Vitamin D deficiency  -     ergocalciferol (VITAMIN D2) 50,000 units; Take 1 capsule (50,000 Units total) by mouth every 14 (fourteen) days    Essential hypertension  -     Discontinue: hydrochlorothiazide (HYDRODIURIL) 12 5 mg tablet; Take 2 tablets (25 mg total) by mouth daily  -     hydrochlorothiazide (HYDRODIURIL) 25 mg tablet; Take 1 tablet (25 mg total) by mouth daily  -     Basic metabolic panel; Future  -     Magnesium; Future    Hypokalemia  -     potassium chloride (K-DUR,KLOR-CON) 20 mEq tablet; Take 1 tablet (20 mEq total) by mouth daily  -     Basic metabolic panel; Future  -     Magnesium; Future    Other orders  -     valACYclovir (VALTREX) 1,000 mg tablet                  Subjective:      Patient ID: Vinnie Nino is a 21 y o  female  Chief Complaint   Patient presents with   • Dizziness     Lightheaded for past week and a half,    • Follow-up     Ed 6/16 [facility] [place] chest pain lightheaded, HTN  Did feel like she was going to pass out  Did EKG Labs Chest xray       72-year-old female seen today for ER follow-up for lightheadedness/dizziness  Symptoms had been occurring for 1 week  She went to an urgent care and was transferred to the ER for further evaluation  She was also experiencing presyncopal symptoms as well as tingling in her fingers and toes with mild chest discomfort    Laboratory studies showed a leukocyte count of 15 2 with elevation in neutrophil count however otherwise within normal range  BMP and cardiac testing were negative  D-dimer was negative  Chest x-ray was negative for acute cardiopulmonary disease  EKG then was normal sinus rhythm  BP has been elevated however she feels it is elevated due to feeling anxious about her symptoms  She has been compliant with her antihypertensive regimen  She started a new job however denies any other stressors  She has been sleeping well  The dizziness occurs with any position  She has increased her oral hydration  Dizziness  This is a new problem  The current episode started 1 to 4 weeks ago  Pertinent negatives include no abdominal pain, chest pain, chills, congestion, coughing, diaphoresis, fatigue, fever, headaches, nausea, numbness, sore throat, vomiting or weakness  The following portions of the patient's history were reviewed and updated as appropriate: allergies, current medications, past family history, past medical history, past social history, past surgical history and problem list     Review of Systems   Constitutional: Negative for activity change, appetite change, chills, diaphoresis, fatigue and fever  HENT: Negative for congestion, postnasal drip, rhinorrhea, sinus pressure, sinus pain, sneezing and sore throat  Eyes: Negative for visual disturbance  Respiratory: Negative for apnea, cough, choking, chest tightness, shortness of breath and wheezing  Cardiovascular: Negative for chest pain, palpitations and leg swelling  Gastrointestinal: Negative for abdominal distention, abdominal pain, anal bleeding, blood in stool, constipation, diarrhea, nausea and vomiting  Endocrine: Negative for cold intolerance and heat intolerance  Genitourinary: Negative for difficulty urinating, dysuria and hematuria  Musculoskeletal: Negative  Skin: Negative  Neurological: Negative for dizziness, weakness, light-headedness, numbness and headaches  Hematological: Negative for adenopathy  Psychiatric/Behavioral: Negative for agitation, sleep disturbance and suicidal ideas  All other systems reviewed and are negative          Past Medical History:   Diagnosis Date   • Acne     resolved-12/3/2015   • Eczema     last assessed-12/3/2015   • Gastroesophageal reflux disease without esophagitis 6/28/2019   • Hypertension     last assessed-12/11/2017   • Iron deficiency anemia 5/21/2019   • Pneumonia 11/4/2019   • Subluxation of left patella     last assessed-3/31/2015         Current Outpatient Medications:   •  albuterol (Proventil HFA) 90 mcg/act inhaler, Inhale 2 puffs every 6 (six) hours as needed for wheezing, Disp: 6 7 g, Rfl: 0  •  amLODIPine (NORVASC) 5 mg tablet, Take 1 tablet (5 mg total) by mouth daily, Disp: 90 tablet, Rfl: 1  •  Biotin w/ Vitamins C & E (HAIR/SKIN/NAILS PO), Take by mouth daily at bedtime, Disp: , Rfl:   •  Calcium Carbonate-Vitamin D 600-200 MG-UNIT TABS, Take 1 tablet by mouth daily, Disp: , Rfl:   •  clindamycin (CLEOCIN T) 1 % lotion, Apply 1 application topically 2 (two) times a day as needed (ezema), Disp: 60 mL, Rfl: 2  •  DAILY MULTIPLE VITAMINS PO, Take 1 tablet by mouth daily , Disp: , Rfl:   •  ergocalciferol (VITAMIN D2) 50,000 units, Take 1 capsule (50,000 Units total) by mouth every 14 (fourteen) days, Disp: 6 capsule, Rfl: 1  •  fluticasone (FLONASE) 50 mcg/act nasal spray, 2 sprays into each nostril daily as needed, Disp: , Rfl:   •  hydrochlorothiazide (HYDRODIURIL) 25 mg tablet, Take 1 tablet (25 mg total) by mouth daily, Disp: 30 tablet, Rfl: 0  •  IRON, FERROUS GLUCONATE, PO, Take 1 tablet by mouth every other day, Disp: , Rfl:   •  levonorgestrel (KYLEENA) 19 5 MG intrauterine device, 1 Intra Uterine Device by Intrauterine route once, Disp: , Rfl:   •  potassium chloride (K-DUR,KLOR-CON) 20 mEq tablet, Take 1 tablet (20 mEq total) by mouth daily, Disp: 30 tablet, Rfl: 0  •  Probiotic Product (DIGESTIVE ADVANTAGE "PO), Take 1 capsule by mouth every other day, Disp: , Rfl:   •  valACYclovir (VALTREX) 1,000 mg tablet, , Disp: , Rfl:     Allergies   Allergen Reactions   • Cefdinir Hives       Social History   Past Surgical History:   Procedure Laterality Date   • TONSILLECTOMY     • WISDOM TOOTH EXTRACTION  03/2020     Family History   Problem Relation Age of Onset   • Allergic rhinitis Mother    • Hypertension Mother    • Diabetes Mother    • Thyroid disease Maternal Grandmother    • Dementia Maternal Grandmother    • COPD Maternal Grandfather    • Asthma Maternal Grandfather    • Atrial fibrillation Maternal Grandfather    • Cancer Other    • Diabetes Other        Objective:  /84 (BP Location: Left arm, Patient Position: Sitting, Cuff Size: Large)   Pulse 82   Temp 99 4 °F (37 4 °C) (Temporal)   Resp 18   Ht 5' 4\" (1 626 m)   Wt (!) 139 kg (306 lb)   LMP 06/09/2023   SpO2 100%   BMI 52 52 kg/m²     Recent Results (from the past 1344 hour(s))   Fingerstick Glucose (POCT)    Collection Time: 06/16/23  5:58 PM   Result Value Ref Range    POC Glucose 88 65 - 140 mg/dl   ECG 12 lead    Collection Time: 06/16/23  6:27 PM   Result Value Ref Range    Ventricular Rate 85 BPM    Atrial Rate 85 BPM    NY Interval 164 ms    QRSD Interval 86 ms    QT Interval 376 ms    QTC Interval 447 ms    P Axis 38 degrees    QRS Axis 31 degrees    T Wave Axis -1 degrees   ECG 12 lead    Collection Time: 06/16/23  6:43 PM   Result Value Ref Range    Ventricular Rate 75 BPM    Atrial Rate 75 BPM    NY Interval 170 ms    QRSD Interval 86 ms    QT Interval 388 ms    QTC Interval 433 ms    P Axis 35 degrees    QRS Axis 32 degrees    T Wave Axis 5 degrees   CBC and differential    Collection Time: 06/16/23  6:50 PM   Result Value Ref Range    WBC 15 20 (H) 4 31 - 10 16 Thousand/uL    RBC 4 62 3 81 - 5 12 Million/uL    Hemoglobin 14 1 11 5 - 15 4 g/dL    Hematocrit 42 5 34 8 - 46 1 %    MCV 92 82 - 98 fL    MCH 30 5 26 8 - 34 3 pg    MCHC 33 2 " "31 4 - 37 4 g/dL    RDW 11 8 11 6 - 15 1 %    MPV 9 3 8 9 - 12 7 fL    Platelets 979 524 - 261 Thousands/uL    nRBC 0 /100 WBCs    Neutrophils Relative 70 43 - 75 %    Immat GRANS % 0 0 - 2 %    Lymphocytes Relative 22 14 - 44 %    Monocytes Relative 6 4 - 12 %    Eosinophils Relative 1 0 - 6 %    Basophils Relative 1 0 - 1 %    Neutrophils Absolute 10 61 (H) 1 85 - 7 62 Thousands/µL    Immature Grans Absolute 0 06 0 00 - 0 20 Thousand/uL    Lymphocytes Absolute 3 38 0 60 - 4 47 Thousands/µL    Monocytes Absolute 0 90 0 17 - 1 22 Thousand/µL    Eosinophils Absolute 0 18 0 00 - 0 61 Thousand/µL    Basophils Absolute 0 07 0 00 - 0 10 Thousands/µL   Basic metabolic panel    Collection Time: 06/16/23  6:50 PM   Result Value Ref Range    Sodium 135 135 - 147 mmol/L    Potassium 3 4 (L) 3 5 - 5 3 mmol/L    Chloride 101 96 - 108 mmol/L    CO2 23 21 - 32 mmol/L    ANION GAP 11 4 - 13 mmol/L    BUN 9 5 - 25 mg/dL    Creatinine 0 54 (L) 0 60 - 1 30 mg/dL    Glucose 103 65 - 140 mg/dL    Calcium 9 9 8 4 - 10 2 mg/dL    eGFR 133 ml/min/1 73sq m   HS Troponin 0hr (reflex protocol)    Collection Time: 06/16/23  6:50 PM   Result Value Ref Range    hs TnI 0hr <2 \"Refer to ACS Flowchart\"- see link ng/L   D-dimer, quantitative    Collection Time: 06/16/23  6:50 PM   Result Value Ref Range    D-Dimer, Quant 0 35 <0 50 ug/ml FEU   hCG, qualitative pregnancy    Collection Time: 06/16/23  6:50 PM   Result Value Ref Range    Preg, Serum Negative Negative   POCT urine HCG    Collection Time: 06/16/23  7:47 PM   Result Value Ref Range    URINE HCG negative             Physical Exam  Vitals and nursing note reviewed  Constitutional:       General: She is not in acute distress  Appearance: She is well-developed  She is not diaphoretic  HENT:      Head: Normocephalic and atraumatic  Eyes:      General:         Right eye: No discharge  Left eye: No discharge        Conjunctiva/sclera: Conjunctivae normal       Pupils: Pupils " are equal, round, and reactive to light  Neck:      Thyroid: No thyromegaly  Vascular: No JVD  Cardiovascular:      Rate and Rhythm: Normal rate and regular rhythm  Heart sounds: Normal heart sounds  No murmur heard  No friction rub  No gallop  Pulmonary:      Effort: Pulmonary effort is normal  No respiratory distress  Breath sounds: Normal breath sounds  No wheezing or rales  Chest:      Chest wall: No tenderness  Abdominal:      General: There is no distension  Palpations: Abdomen is soft  Tenderness: There is no abdominal tenderness  Musculoskeletal:         General: No tenderness or deformity  Normal range of motion  Cervical back: Normal range of motion and neck supple  Lymphadenopathy:      Cervical: No cervical adenopathy  Skin:     General: Skin is warm and dry  Coloration: Skin is not pale  Findings: No erythema or rash  Neurological:      Mental Status: She is alert and oriented to person, place, and time  Cranial Nerves: No cranial nerve deficit  Coordination: Coordination normal    Psychiatric:         Behavior: Behavior normal          Thought Content:  Thought content normal          Judgment: Judgment normal

## 2023-06-19 NOTE — ED PROVIDER NOTES
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 under a lot of stress recently  She has been feeling lightheaded throughout the day today  Symptoms have been somewhat intermittent without alleviators aspirating factors  She also at times feels some tingling in her fingers and toes  She says over the past 3 to 4 hours she has had some mild chest discomfort as well  It is substernal constant without leaving exacerbating factors  Nonradiating  She does have IUD containing estrogen but otherwise denies PE or DVT risk factors  She otherwise denies nausea vomiting unable to eat and drink with past couple days  Denies fever chills rigors or cough  She denies abdominal pain urinary or bowel symptoms  Prior to Admission Medications   Prescriptions Last Dose Informant Patient Reported? Taking?    Biotin w/ Vitamins C & E (HAIR/SKIN/NAILS PO)  Self Yes No   Sig: Take by mouth daily at bedtime   Calcium Carbonate-Vitamin D 600-200 MG-UNIT TABS  Self Yes No   Sig: Take 1 tablet by mouth daily   DAILY MULTIPLE VITAMINS PO  Self Yes No   Sig: Take 1 tablet by mouth daily    IRON, FERROUS GLUCONATE, PO  Self Yes No   Sig: Take 1 tablet by mouth every other day   Probiotic Product (DIGESTIVE ADVANTAGE PO)  Self Yes No   Sig: Take 1 capsule by mouth every other day   albuterol (Proventil HFA) 90 mcg/act inhaler  Self No No   Sig: Inhale 2 puffs every 6 (six) hours as needed for wheezing   amLODIPine (NORVASC) 5 mg tablet   No No   Sig: Take 1 tablet (5 mg total) by mouth daily   clindamycin (CLEOCIN T) 1 % lotion  Self No No   Sig: Apply 1 application topically 2 (two) times a day as needed (ezema)   ergocalciferol (VITAMIN D2) 50,000 units  Self No No   Sig: Take 1 capsule (50,000 Units total) by mouth every 14 (fourteen) days   fluticasone (FLONASE) 50 mcg/act nasal spray  Self Yes No   Si sprays into each nostril daily as needed   hydrochlorothiazide (HYDRODIURIL) 12 5 mg tablet   No No   Sig: Take 1 tablet (12 5 mg total) by mouth daily   levonorgestrel (KYLEENA) 19 5 MG intrauterine device  Self Yes No   Si Intra Uterine Device by Intrauterine route once   semaglutide, 0 25 or 0 5 mg/dose, (Ozempic) 2 mg/1 5 mL injection pen  Self No No   Sig: Inject 0 19 mL (0 25 mg total) under the skin every 7 days for 28 days, THEN 0 38 mL (0 5 mg total) every 7 days  Facility-Administered Medications: None       Past Medical History:   Diagnosis Date   • Acne     resolved-12/3/2015   • Eczema     last assessed-12/3/2015   • Gastroesophageal reflux disease without esophagitis 2019   • Hypertension     last assessed-2017   • Iron deficiency anemia 2019   • Pneumonia 2019   • Subluxation of left patella     last assessed-3/31/2015       Past Surgical History:   Procedure Laterality Date   • TONSILLECTOMY     • WISDOM TOOTH EXTRACTION  2020       Family History   Problem Relation Age of Onset   • Allergic rhinitis Mother    • Hypertension Mother    • Diabetes Mother    • Thyroid disease Maternal Grandmother    • Dementia Maternal Grandmother    • COPD Maternal Grandfather    • Asthma Maternal Grandfather    • Atrial fibrillation Maternal Grandfather    • Cancer Other    • Diabetes Other      I have reviewed and agree with the history as documented      E-Cigarette/Vaping   • E-Cigarette Use Never User      E-Cigarette/Vaping Substances   • Nicotine No    • THC No    • CBD No    • Flavoring No    • Other No    • Unknown No      Social History     Tobacco Use   • Smoking status: Never   • Smokeless tobacco: Never   Vaping Use   • Vaping Use: Never used   Substance Use Topics   • Alcohol use: Yes     Comment: social   • Drug use: No       Review of Systems   Constitutional: Negative for fever  HENT: Negative for sore throat  Respiratory: Negative for shortness of breath  Cardiovascular: Positive for chest pain  Gastrointestinal: Negative for abdominal pain  Genitourinary: Negative for dysuria  Musculoskeletal: Negative for back pain  Skin: Negative for rash  Neurological: Positive for light-headedness  Psychiatric/Behavioral: Negative for agitation  All other systems reviewed and are negative  Physical Exam  Physical Exam  Vitals reviewed  Constitutional:       General: She is not in acute distress  Appearance: She is well-developed  HENT:      Head: Normocephalic  Eyes:      Pupils: Pupils are equal, round, and reactive to light  Cardiovascular:      Rate and Rhythm: Normal rate and regular rhythm  Heart sounds: Normal heart sounds  Pulmonary:      Effort: Pulmonary effort is normal       Breath sounds: Normal breath sounds  Abdominal:      General: Bowel sounds are normal  There is no distension  Palpations: Abdomen is soft  Tenderness: There is no abdominal tenderness  There is no guarding  Musculoskeletal:         General: No tenderness or deformity  Normal range of motion  Cervical back: Normal range of motion and neck supple  Skin:     General: Skin is warm and dry  Capillary Refill: Capillary refill takes less than 2 seconds  Neurological:      Mental Status: She is alert and oriented to person, place, and time  Cranial Nerves: No cranial nerve deficit  Sensory: No sensory deficit  Psychiatric:         Behavior: Behavior normal          Thought Content:  Thought content normal          Judgment: Judgment normal          Vital Signs  ED Triage Vitals [06/16/23 1829]   Temperature Pulse Respirations Blood Pressure SpO2   98 5 °F (36 9 °C) 87 18 (!) 162/101 99 %      Temp Source Heart Rate Source Patient Position - Orthostatic VS BP Location FiO2 (%)   Temporal Monitor -- Left arm --      Pain Score       --           Vitals:    06/16/23 1829   BP: (!) 162/101   Pulse: 87         Visual Acuity  Visual Acuity    Flowsheet Row Most Recent Value   L Pupil Size (mm) 3   R Pupil Size (mm) 3          ED Medications  Medications   sodium chloride 0 9 % bolus 1,000 mL (0 mL Intravenous Stopped 6/16/23 2039)       Diagnostic Studies  Results Reviewed     Procedure Component Value Units Date/Time    hCG, qualitative pregnancy [[id number]]  (Normal) Collected: 06/16/23 1850    Lab Status: Final result Specimen: Blood from Arm, Left Updated: 06/16/23 1927     Preg, Serum Negative    HS Troponin 0hr (reflex protocol) [[id number]]  (Normal) Collected: 06/16/23 1850    Lab Status: Final result Specimen: Blood from Arm, Left Updated: 06/16/23 1927     hs TnI 0hr <2 ng/L     Basic metabolic panel [[id number]]  (Abnormal) Collected: 06/16/23 1850    Lab Status: Final result Specimen: Blood from Arm, Left Updated: 06/16/23 1919     Sodium 135 mmol/L      Potassium 3 4 mmol/L      Chloride 101 mmol/L      CO2 23 mmol/L      ANION GAP 11 mmol/L      BUN 9 mg/dL      Creatinine 0 54 mg/dL      Glucose 103 mg/dL      Calcium 9 9 mg/dL      eGFR 133 ml/min/1 73sq m     Narrative:      Roseline guidelines for Chronic Kidney Disease (CKD):   •  Stage 1 with normal or high GFR (GFR > 90 mL/min/1 73 square meters)  •  Stage 2 Mild CKD (GFR = 60-89 mL/min/1 73 square meters)  •  Stage 3A Moderate CKD (GFR = 45-59 mL/min/1 73 square meters)  •  Stage 3B Moderate CKD (GFR = 30-44 mL/min/1 73 square meters)  •  Stage 4 Severe CKD (GFR = 15-29 mL/min/1 73 square meters)  •  Stage 5 End Stage CKD (GFR <15 mL/min/1 73 square meters)  Note: GFR calculation is accurate only with a steady state creatinine    D-dimer, quantitative [[id number]]  (Normal) Collected: 06/16/23 1850    Lab Status: Final result Specimen: Blood from Arm, Left Updated: 06/16/23 1917     D-Dimer, Quant 0 35 ug/ml FEU     CBC and differential [[id number]]  (Abnormal) Collected: 06/16/23 1850    Lab Status: Final result Specimen: Blood from Arm, Left Updated: 06/16/23 1857     WBC 15 20 Thousand/uL      RBC 4 62 Million/uL      Hemoglobin 14 1 g/dL      Hematocrit 42 5 %      MCV 92 fL      MCH 30 5 pg      MCHC 33 2 g/dL      RDW 11 8 %      MPV 9 3 fL      Platelets 182 Thousands/uL      nRBC 0 /100 WBCs      Neutrophils Relative 70 %      Immat GRANS % 0 %      Lymphocytes Relative 22 %      Monocytes Relative 6 %      Eosinophils Relative 1 %      Basophils Relative 1 %      Neutrophils Absolute 10 61 Thousands/µL      Immature Grans Absolute 0 06 Thousand/uL      Lymphocytes Absolute 3 38 Thousands/µL      Monocytes Absolute 0 90 Thousand/µL      Eosinophils Absolute 0 18 Thousand/µL      Basophils Absolute 0 07 Thousands/µL                  XR chest 1 view portable   Final Result by Anastasiya Magana MD (06/17 1001)      No acute cardiopulmonary disease        Findings are stable            Workstation performed: [id number]                    Procedures  ECG 12 Lead Documentation Only    Date/Time: 6/16/2023 8:00 PM    Performed by: Anna Amaya MD  Authorized by: Anna Amaya MD    ECG reviewed by me, the ED Provider: yes    Patient location:  ED  Previous ECG:     Previous ECG:  Unavailable    Comparison to cardiac monitor: Yes    Interpretation:     Interpretation: non-specific    Rate:     ECG rate assessment: normal    Rhythm:     Rhythm: sinus rhythm    Ectopy:     Ectopy: none    QRS:     QRS axis:  Normal    QRS intervals:  Normal  Conduction:     Conduction: normal    ST segments:     ST segments:  Normal  T waves:     T waves: inverted      Inverted:  III             ED Course  ED Course as of 06/19/23 1308   Fri Jun 16, 2023 1920 D-Dimer, Quant: 0 35                               SBIRT 22yo+    Eva Sicks Most Recent Value   Initial Alcohol Screen: US AUDIT-C     1  How often do you have a drink containing alcohol? 0 Filed at: 06/16/2023 1831   2  How many drinks containing alcohol do you have on a typical day you are drinking? 0 Filed at: 06/16/2023 1831   3a  Male UNDER 65: How often do you have five or more drinks on one occasion? 0 Filed at: 06/16/2023 1831   3b  FEMALE Any Age, or MALE 65+: How often do you have 4 or more drinks on one occassion? 0 Filed at: 06/16/2023 1831   Audit-C Score 0 Filed at: 06/16/2023 1831   LISA: How many times in the past year have you    Used an illegal drug or used a prescription medication for non-medical reasons? Never Filed at: 06/16/2023 1831                    Medical Decision Making  Patient is a 45-year-old female that presents for evaluation of lightheadedness tingling in her fingers and toes and chest pain  Initial concern for ACS, PE, pneumothorax  Work-up however negative  Blood work reviewed myself with troponin negative and D-dimer negative  Chest x-ray reviewed by myself with no acute pathology noted  IV fluids given with improvement of symptoms  Patient does endorse some anxiety and stress with personal stressors recently, potentially anxiety induced symptoms  Also mild dehydration possible as well  Advised patient on aggressive p o  hydration and follow-up to PCP regarding anxiety  Strict return precautions discussed  Amount and/or Complexity of Data Reviewed  Labs: ordered  Decision-making details documented in ED Course  Radiology: ordered and independent interpretation performed  ECG/medicine tests: ordered and independent interpretation performed            Disposition  Final diagnoses:   Non-cardiac chest pain   Episodic lightheadedness     Time reflects when diagnosis was documented in both MDM as applicable and the Disposition within this note     Time User Action Codes Description Comment    6/16/2023  8:14 PM Carmina Rosales Add [P02 56] Non-cardiac chest pain     6/16/2023  8:14 PM Imeldalan Sommer Add [R42] Episodic lightheadedness       ED Disposition     ED Disposition   Discharge    Condition   Stable    Date/Time   Fri Jun 16, 2023  7:39 PM    Comment   Isabell Rodriges discharge to home/self care                 Follow-up Information     Follow up With Specialties Details Why [address] [facility] Emergency Department Emergency Medicine  If symptoms worsen [address] [place] [id number]  [facility] Emergency Department, [address] [facility], [place], [address] [facility]          Discharge Medication List as of 6/16/2023  8:38 PM      CONTINUE these medications which have NOT CHANGED    Details   albuterol (Proventil HFA) 90 mcg/act inhaler Inhale 2 puffs every 6 (six) hours as needed for wheezing, Starting Tue 4/19/2022, Normal      amLODIPine (NORVASC) 5 mg tablet Take 1 tablet (5 mg total) by mouth daily, Starting Fri 4/14/2023, Normal      Biotin w/ Vitamins C & E (HAIR/SKIN/NAILS PO) Take by mouth daily at bedtime, Historical Med      Calcium Carbonate-Vitamin D 600-200 MG-UNIT TABS Take 1 tablet by mouth daily, Starting Fri 2/27/2015, Historical Med      clindamycin (CLEOCIN T) 1 % lotion Apply 1 application topically 2 (two) times a day as needed (ezema), Starting Wed 1/4/2023, Normal      DAILY MULTIPLE VITAMINS PO Take 1 tablet by mouth daily , Historical Med      ergocalciferol (VITAMIN D2) 50,000 units Take 1 capsule (50,000 Units total) by mouth every 14 (fourteen) days, Starting Tue 1/31/2023, Normal      fluticasone (FLONASE) 50 mcg/act nasal spray 2 sprays into each nostril daily as needed, Starting Wed 3/16/2016, Historical Med      hydrochlorothiazide (HYDRODIURIL) 12 5 mg tablet Take 1 tablet (12 5 mg total) by mouth daily, Starting Wed 5/24/2023, Normal      IRON, FERROUS GLUCONATE, PO Take 1 tablet by mouth every other day, Historical Med levonorgestrel (KYLEENA) 19 5 MG intrauterine device 1 Intra Uterine Device by Intrauterine route once, Historical Med      Probiotic Product (DIGESTIVE ADVANTAGE PO) Take 1 capsule by mouth every other day, Historical Med      semaglutide, 0 25 or 0 5 mg/dose, (Ozempic) 2 mg/1 5 mL injection pen Multiple Dosages:Starting Fri 3/24/2023, Until Thu 4/20/2023 at 2359, THEN Starting Fri 4/21/2023, Until Thu 6/15/2023 at 2359Inject 0 19 mL (0 25 mg total) under the skin every 7 days for 28 days, THEN 0 38 mL (0 5 mg total) every 7 days  , Normal             No discharge procedures on file      PDMP Review     None          ED Provider  Electronically Signed by           Gerhardt Donald, MD  06/19/23 6259

## 2023-06-21 LAB
ATRIAL RATE: 93 BPM
P AXIS: 42 DEGREES
PR INTERVAL: 160 MS
QRS AXIS: 10 DEGREES
QRSD INTERVAL: 82 MS
QT INTERVAL: 366 MS
QTC INTERVAL: 455 MS
T WAVE AXIS: 13 DEGREES
VENTRICULAR RATE: 93 BPM

## 2023-06-21 PROCEDURE — 93010 ELECTROCARDIOGRAM REPORT: CPT | Performed by: INTERNAL MEDICINE

## 2023-06-29 ENCOUNTER — OFFICE VISIT (OUTPATIENT)
Dept: INTERNAL MEDICINE CLINIC | Facility: OTHER | Age: 23
End: 2023-06-29
Payer: COMMERCIAL

## 2023-06-29 VITALS
BODY MASS INDEX: 50.02 KG/M2 | OXYGEN SATURATION: 98 % | TEMPERATURE: 98.2 F | HEART RATE: 103 BPM | SYSTOLIC BLOOD PRESSURE: 138 MMHG | HEIGHT: 64 IN | WEIGHT: 293 LBS | DIASTOLIC BLOOD PRESSURE: 84 MMHG

## 2023-06-29 DIAGNOSIS — J06.9 URI, ACUTE: Primary | ICD-10-CM

## 2023-06-29 PROCEDURE — 99213 OFFICE O/P EST LOW 20 MIN: CPT | Performed by: INTERNAL MEDICINE

## 2023-06-29 RX ORDER — BENZONATATE 100 MG/1
100 CAPSULE ORAL 3 TIMES DAILY PRN
Qty: 20 CAPSULE | Refills: 0 | Status: SHIPPED | OUTPATIENT
Start: 2023-06-29

## 2023-06-29 RX ORDER — PROMETHAZINE HYDROCHLORIDE AND CODEINE PHOSPHATE 6.25; 1 MG/5ML; MG/5ML
5 SYRUP ORAL EVERY 4 HOURS PRN
Qty: 180 ML | Refills: 0 | Status: SHIPPED | OUTPATIENT
Start: 2023-06-29 | End: 2023-06-29

## 2023-06-29 RX ORDER — ACETAMINOPHEN AND CODEINE PHOSPHATE 120; 12 MG/5ML; MG/5ML
5 SOLUTION ORAL EVERY 6 HOURS PRN
Qty: 118 ML | Refills: 0 | Status: SHIPPED | OUTPATIENT
Start: 2023-06-29 | End: 2023-07-03

## 2023-06-29 NOTE — PROGRESS NOTES
Assessment/Plan:    URI, acute  Symptoms likely viral in etiology  Will defer on antibiotics  Will prescribe Tylenol-codeine and benzonatate for symptoms  Discussed over-the-counter medications to take for symptom relief  She is to contact our office for worsening symptoms  Diagnoses and all orders for this visit:    URI, acute  -     Discontinue: promethazine-codeine (PHENERGAN WITH CODEINE) 6 25-10 mg/5 mL syrup; Take 5 mL by mouth every 4 (four) hours as needed for cough  -     benzonatate (TESSALON PERLES) 100 mg capsule; Take 1 capsule (100 mg total) by mouth 3 (three) times a day as needed for cough  -     acetaminophen-codeine (TYLENOL WITH CODEINE) 120-12 mg/5 mL solution; Take 5 mL by mouth every 6 (six) hours as needed for moderate pain                  Subjective:      Patient ID: Rosaura Snowden is a 21 y o  female  Chief Complaint   Patient presents with   • Cold Like Symptoms     C/O congestion 104 3 fever last night took tylenol  Painful throat with swallowing  Coughing        21year old female is seen today with concern for acute URI symptoms  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, 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  Fever - 9 weeks to 74 years   This is a new problem  The current episode started in the past 7 days  The problem occurs constantly  The problem has been unchanged  The maximum temperature noted was more than 104 F  The temperature was taken using a tympanic thermometer  Associated symptoms include congestion, coughing, headaches, muscle aches, nausea and a sore throat  Pertinent negatives include no abdominal pain, chest pain, diarrhea, ear pain, urinary pain, vomiting or wheezing  URI   This is a chronic problem  The current episode started more than 1 year ago  The problem has been unchanged  Associated symptoms include congestion, coughing, headaches, nausea and a sore throat   Pertinent negatives include no abdominal pain, chest pain, diarrhea, dysuria, ear pain, rhinorrhea, sinus pain, sneezing, vomiting or wheezing  She has tried decongestant for the symptoms  The following portions of the patient's history were reviewed and updated as appropriate: allergies, current medications, past family history, past medical history, past social history, past surgical history and problem list     Review of Systems   Constitutional: Positive for fever  Negative for activity change, appetite change, chills, diaphoresis and fatigue  HENT: Positive for congestion and sore throat  Negative for ear pain, postnasal drip, rhinorrhea, sinus pressure, sinus pain and sneezing  Eyes: Negative for visual disturbance  Respiratory: Positive for cough  Negative for apnea, choking, chest tightness, shortness of breath and wheezing  Cardiovascular: Negative for chest pain, palpitations and leg swelling  Gastrointestinal: Positive for nausea  Negative for abdominal distention, abdominal pain, anal bleeding, blood in stool, constipation, diarrhea and vomiting  Endocrine: Negative for cold intolerance and heat intolerance  Genitourinary: Negative for difficulty urinating, dysuria and hematuria  Musculoskeletal: Negative  Skin: Negative  Neurological: Positive for headaches  Negative for dizziness, weakness, light-headedness and numbness  Hematological: Negative for adenopathy  Psychiatric/Behavioral: Negative for agitation, sleep disturbance and suicidal ideas  All other systems reviewed and are negative          Past Medical History:   Diagnosis Date   • Acne     resolved-12/3/2015   • Eczema     last assessed-12/3/2015   • Gastroesophageal reflux disease without esophagitis 6/28/2019   • Hypertension     last assessed-12/11/2017   • Iron deficiency anemia 5/21/2019   • Pneumonia 11/4/2019   • Subluxation of left patella     last assessed-3/31/2015         Current Outpatient Medications:   •  acetaminophen-codeine (TYLENOL WITH CODEINE) 120-12 mg/5 mL solution, Take 5 mL by mouth every 6 (six) hours as needed for moderate pain, Disp: 118 mL, Rfl: 0  •  albuterol (Proventil HFA) 90 mcg/act inhaler, Inhale 2 puffs every 6 (six) hours as needed for wheezing, Disp: 6 7 g, Rfl: 0  •  amLODIPine (NORVASC) 5 mg tablet, Take 1 tablet (5 mg total) by mouth daily, Disp: 90 tablet, Rfl: 1  •  benzonatate (TESSALON PERLES) 100 mg capsule, Take 1 capsule (100 mg total) by mouth 3 (three) times a day as needed for cough, Disp: 20 capsule, Rfl: 0  •  Biotin w/ Vitamins C & E (HAIR/SKIN/NAILS PO), Take by mouth daily at bedtime, Disp: , Rfl:   •  Calcium Carbonate-Vitamin D 600-200 MG-UNIT TABS, Take 1 tablet by mouth daily, Disp: , Rfl:   •  clindamycin (CLEOCIN T) 1 % lotion, Apply 1 application topically 2 (two) times a day as needed (ezema), Disp: 60 mL, Rfl: 2  •  DAILY MULTIPLE VITAMINS PO, Take 1 tablet by mouth daily , Disp: , Rfl:   •  ergocalciferol (VITAMIN D2) 50,000 units, Take 1 capsule (50,000 Units total) by mouth every 14 (fourteen) days, Disp: 6 capsule, Rfl: 1  •  fluticasone (FLONASE) 50 mcg/act nasal spray, 2 sprays into each nostril daily as needed, Disp: , Rfl:   •  hydrochlorothiazide (HYDRODIURIL) 25 mg tablet, Take 1 tablet (25 mg total) by mouth daily, Disp: 30 tablet, Rfl: 0  •  IRON, FERROUS GLUCONATE, PO, Take 1 tablet by mouth every other day, Disp: , Rfl:   •  levonorgestrel (KYLEENA) 19 5 MG intrauterine device, 1 Intra Uterine Device by Intrauterine route once, Disp: , Rfl:   •  potassium chloride (K-DUR,KLOR-CON) 20 mEq tablet, Take 1 tablet (20 mEq total) by mouth daily, Disp: 30 tablet, Rfl: 0  •  Probiotic Product (DIGESTIVE ADVANTAGE PO), Take 1 capsule by mouth every other day, Disp: , Rfl:   •  valACYclovir (VALTREX) 1,000 mg tablet, , Disp: , Rfl:     Allergies   Allergen Reactions   • Cefdinir Hives       Social History   Past Surgical History:   Procedure Laterality Date   • TONSILLECTOMY     • WISDOM TOOTH EXTRACTION  03/2020     Family History   Problem Relation Age of "Onset   • Allergic rhinitis Mother    • Hypertension Mother    • Diabetes Mother    • Thyroid disease Maternal Grandmother    • Dementia Maternal Grandmother    • COPD Maternal Grandfather    • Asthma Maternal Grandfather    • Atrial fibrillation Maternal Grandfather    • Cancer Other    • Diabetes Other        Objective:  /84 (BP Location: Left arm, Patient Position: Sitting, Cuff Size: Large)   Pulse 103   Temp 98 2 °F (36 8 °C) (Temporal)   Ht 5' 4\" (1 626 m)   Wt (!) 139 kg (307 lb)   LMP 06/09/2023   SpO2 98%   BMI 52 70 kg/m²     Recent Results (from the past 1344 hour(s))   ECG 12 lead    Collection Time: 06/16/23  5:41 PM   Result Value Ref Range    Ventricular Rate 93 BPM    Atrial Rate 93 BPM    MI Interval 160 ms    QRSD Interval 82 ms    QT Interval 366 ms    QTC Interval 455 ms    P Axis 42 degrees    QRS Axis 10 degrees    T Wave Axis 13 degrees   Fingerstick Glucose (POCT)    Collection Time: 06/16/23  5:58 PM   Result Value Ref Range    POC Glucose 88 65 - 140 mg/dl   ECG 12 lead    Collection Time: 06/16/23  6:27 PM   Result Value Ref Range    Ventricular Rate 85 BPM    Atrial Rate 85 BPM    MI Interval 164 ms    QRSD Interval 86 ms    QT Interval 376 ms    QTC Interval 447 ms    P Axis 38 degrees    QRS Axis 31 degrees    T Wave Axis -1 degrees   ECG 12 lead    Collection Time: 06/16/23  6:43 PM   Result Value Ref Range    Ventricular Rate 75 BPM    Atrial Rate 75 BPM    MI Interval 170 ms    QRSD Interval 86 ms    QT Interval 388 ms    QTC Interval 433 ms    P Axis 35 degrees    QRS Axis 32 degrees    T Wave Axis 5 degrees   CBC and differential    Collection Time: 06/16/23  6:50 PM   Result Value Ref Range    WBC 15 20 (H) 4 31 - 10 16 Thousand/uL    RBC 4 62 3 81 - 5 12 Million/uL    Hemoglobin 14 1 11 5 - 15 4 g/dL    Hematocrit 42 5 34 8 - 46 1 %    MCV 92 82 - 98 fL    MCH 30 5 26 8 - 34 3 pg    MCHC 33 2 31 4 - 37 4 g/dL    RDW 11 8 11 6 - 15 1 %    MPV 9 3 8 9 - 12 7 fL    " "Platelets [id number] - 803 Thousands/uL    nRBC 0 /100 WBCs    Neutrophils Relative 70 43 - 75 %    Immat GRANS % 0 0 - 2 %    Lymphocytes Relative 22 14 - 44 %    Monocytes Relative 6 4 - 12 %    Eosinophils Relative 1 0 - 6 %    Basophils Relative 1 0 - 1 %    Neutrophils Absolute 10 61 (H) 1 85 - 7 62 Thousands/µL    Immature Grans Absolute 0 06 0 00 - 0 20 Thousand/uL    Lymphocytes Absolute 3 38 0 60 - 4 47 Thousands/µL    Monocytes Absolute 0 90 0 17 - 1 22 Thousand/µL    Eosinophils Absolute 0 18 0 00 - 0 61 Thousand/µL    Basophils Absolute 0 07 0 00 - 0 10 Thousands/µL   Basic metabolic panel    Collection Time: 06/16/23  6:50 PM   Result Value Ref Range    Sodium 135 135 - 147 mmol/L    Potassium 3 4 (L) 3 5 - 5 3 mmol/L    Chloride 101 96 - 108 mmol/L    CO2 23 21 - 32 mmol/L    ANION GAP 11 4 - 13 mmol/L    BUN 9 5 - 25 mg/dL    Creatinine 0 54 (L) 0 60 - 1 30 mg/dL    Glucose 103 65 - 140 mg/dL    Calcium 9 9 8 4 - 10 2 mg/dL    eGFR 133 ml/min/1 73sq m   HS Troponin 0hr (reflex protocol)    Collection Time: 06/16/23  6:50 PM   Result Value Ref Range    hs TnI 0hr <2 \"Refer to ACS Flowchart\"- see link ng/L   D-dimer, quantitative    Collection Time: 06/16/23  6:50 PM   Result Value Ref Range    D-Dimer, Quant 0 35 <0 50 ug/ml FEU   hCG, qualitative pregnancy    Collection Time: 06/16/23  6:50 PM   Result Value Ref Range    Preg, Serum Negative Negative   POCT urine HCG    Collection Time: 06/16/23  7:47 PM   Result Value Ref Range    URINE HCG negative             Physical Exam  Vitals and nursing note reviewed  Constitutional:       General: She is not in acute distress  Appearance: She is well-developed  She is not diaphoretic  HENT:      Head: Normocephalic and atraumatic  Eyes:      General:         Right eye: No discharge  Left eye: No discharge  Conjunctiva/sclera: Conjunctivae normal       Pupils: Pupils are equal, round, and reactive to light     Neck:      Thyroid: No " thyromegaly  Vascular: No JVD  Cardiovascular:      Rate and Rhythm: Normal rate and regular rhythm  Heart sounds: Normal heart sounds  No murmur heard  No friction rub  No gallop  Pulmonary:      Effort: Pulmonary effort is normal  No respiratory distress  Breath sounds: Normal breath sounds  No wheezing or rales  Chest:      Chest wall: No tenderness  Abdominal:      General: There is no distension  Palpations: Abdomen is soft  Tenderness: There is no abdominal tenderness  Musculoskeletal:         General: No tenderness or deformity  Normal range of motion  Cervical back: Normal range of motion and neck supple  Lymphadenopathy:      Cervical: No cervical adenopathy  Skin:     General: Skin is warm and dry  Coloration: Skin is not pale  Findings: No erythema or rash  Neurological:      Mental Status: She is alert and oriented to person, place, and time  Cranial Nerves: No cranial nerve deficit  Coordination: Coordination normal    Psychiatric:         Behavior: Behavior normal          Thought Content:  Thought content normal          Judgment: Judgment normal

## 2023-06-29 NOTE — ASSESSMENT & PLAN NOTE
Symptoms likely viral in etiology  Will defer on antibiotics  Will prescribe Tylenol-codeine and benzonatate for symptoms  Discussed over-the-counter medications to take for symptom relief  She is to contact our office for worsening symptoms

## 2023-06-30 ENCOUNTER — APPOINTMENT (OUTPATIENT)
Dept: LAB | Facility: CLINIC | Age: 23
End: 2023-06-30
Payer: COMMERCIAL

## 2023-06-30 DIAGNOSIS — E87.6 HYPOKALEMIA: ICD-10-CM

## 2023-06-30 DIAGNOSIS — Z13.220 SCREENING CHOLESTEROL LEVEL: ICD-10-CM

## 2023-06-30 DIAGNOSIS — I10 ESSENTIAL HYPERTENSION: ICD-10-CM

## 2023-06-30 DIAGNOSIS — I10 PRIMARY HYPERTENSION: ICD-10-CM

## 2023-06-30 DIAGNOSIS — E55.9 VITAMIN D DEFICIENCY: ICD-10-CM

## 2023-06-30 LAB
ANION GAP SERPL CALCULATED.3IONS-SCNC: 4 MMOL/L
BUN SERPL-MCNC: 11 MG/DL (ref 5–25)
CALCIUM SERPL-MCNC: 8.8 MG/DL (ref 8.3–10.1)
CHLORIDE SERPL-SCNC: 105 MMOL/L (ref 96–108)
CO2 SERPL-SCNC: 27 MMOL/L (ref 21–32)
CREAT SERPL-MCNC: 0.47 MG/DL (ref 0.6–1.3)
GFR SERPL CREATININE-BSD FRML MDRD: 139 ML/MIN/1.73SQ M
GLUCOSE P FAST SERPL-MCNC: 83 MG/DL (ref 65–99)
MAGNESIUM SERPL-MCNC: 2.1 MG/DL (ref 1.6–2.6)
POTASSIUM SERPL-SCNC: 3.5 MMOL/L (ref 3.5–5.3)
SODIUM SERPL-SCNC: 136 MMOL/L (ref 135–147)

## 2023-06-30 PROCEDURE — 83735 ASSAY OF MAGNESIUM: CPT

## 2023-06-30 PROCEDURE — 36415 COLL VENOUS BLD VENIPUNCTURE: CPT

## 2023-06-30 PROCEDURE — 80048 BASIC METABOLIC PNL TOTAL CA: CPT

## 2023-07-03 ENCOUNTER — OFFICE VISIT (OUTPATIENT)
Dept: INTERNAL MEDICINE CLINIC | Facility: OTHER | Age: 23
End: 2023-07-03
Payer: COMMERCIAL

## 2023-07-03 VITALS
RESPIRATION RATE: 20 BRPM | WEIGHT: 293 LBS | BODY MASS INDEX: 50.02 KG/M2 | TEMPERATURE: 98.1 F | SYSTOLIC BLOOD PRESSURE: 144 MMHG | OXYGEN SATURATION: 97 % | HEART RATE: 73 BPM | DIASTOLIC BLOOD PRESSURE: 84 MMHG | HEIGHT: 64 IN

## 2023-07-03 DIAGNOSIS — I10 PRIMARY HYPERTENSION: Primary | ICD-10-CM

## 2023-07-03 DIAGNOSIS — J06.9 URI, ACUTE: ICD-10-CM

## 2023-07-03 DIAGNOSIS — E87.6 HYPOKALEMIA: ICD-10-CM

## 2023-07-03 PROBLEM — E66.9 CHILDHOOD OBESITY: Status: RESOLVED | Noted: 2017-06-20 | Resolved: 2023-07-03

## 2023-07-03 PROCEDURE — 99214 OFFICE O/P EST MOD 30 MIN: CPT | Performed by: INTERNAL MEDICINE

## 2023-07-03 RX ORDER — AMOXICILLIN AND CLAVULANATE POTASSIUM 875; 125 MG/1; MG/1
1 TABLET, FILM COATED ORAL EVERY 12 HOURS SCHEDULED
Qty: 14 TABLET | Refills: 0 | Status: SHIPPED | OUTPATIENT
Start: 2023-07-03 | End: 2023-07-11

## 2023-07-03 RX ORDER — POTASSIUM CHLORIDE 20 MEQ/1
40 TABLET, EXTENDED RELEASE ORAL DAILY
Qty: 180 TABLET | Refills: 1 | Status: SHIPPED | OUTPATIENT
Start: 2023-07-03

## 2023-07-03 NOTE — ASSESSMENT & PLAN NOTE
Will prescribe Augmentin twice a day for 7 days. She has a history of hives with cefdinir however has been able to tolerate penicillins in the past.  Continue over-the-counter decongestants. She is to contact our office for worsening symptoms.

## 2023-07-03 NOTE — ASSESSMENT & PLAN NOTE
We will increase potassium from 20 mill equivalents daily to 40 mill equivalents daily. Discussed incorporating foods rich in potassium into her diet.

## 2023-07-03 NOTE — PROGRESS NOTES
Assessment/Plan:    URI, acute  Will prescribe Augmentin twice a day for 7 days. She has a history of hives with cefdinir however has been able to tolerate penicillins in the past.  Continue over-the-counter decongestants. She is to contact our office for worsening symptoms. Hypertension  Improved. For now, continue amlodipine 5 mg daily and hydrochlorothiazide 25 mg daily. Hypokalemia  We will increase potassium from 20 mill equivalents daily to 40 mill equivalents daily. Discussed incorporating foods rich in potassium into her diet. Diagnoses and all orders for this visit:    Primary hypertension    URI, acute  -     amoxicillin-clavulanate (AUGMENTIN) 875-125 mg per tablet; Take 1 tablet by mouth every 12 (twelve) hours for 7 days    Hypokalemia  -     potassium chloride (K-DUR,KLOR-CON) 20 mEq tablet; Take 2 tablets (40 mEq total) by mouth daily    BMI 45.0-49.9, adult ([facility])          BMI Counseling: Body mass index is 52.97 kg/m². The BMI is above normal. Nutrition recommendations include decreasing portion sizes, encouraging healthy choices of fruits and vegetables, decreasing fast food intake, consuming healthier snacks, limiting drinks that contain sugar, moderation in carbohydrate intake, increasing intake of lean protein, reducing intake of saturated and trans fat and reducing intake of cholesterol. Exercise recommendations include moderate physical activity 150 minutes/week and exercising 3-5 times per week. No pharmacotherapy was ordered. Patient referred to PCP. Rationale for BMI follow-up plan is due to patient being overweight or obese. Subjective:      Patient ID: José Luis Sloan is a 21 y.o. female. Chief Complaint   Patient presents with   • Follow-up     2 wk - labs done 6/30   • URI     Suffering with symptoms all weekend       24-year-old female seen today for follow-up of chronic conditions. Laboratory studies reviewed today, potassium remains low at 3.5. Magnesium 2.1. She has been compliant with her medication regimen. She has been monitoring her blood pressures and reports improvement in systolic pressure. She continues to experience URI symptoms to which have actually been worsening over the weekend. She has been taking over-the-counter decongestant with improvement of symptoms. URI   This is a new problem. The current episode started in the past 7 days. The problem has been gradually worsening. Associated symptoms include congestion, coughing, headaches and rhinorrhea. Pertinent negatives include no abdominal pain, chest pain, diarrhea, dysuria, nausea, sinus pain, sneezing, sore throat, vomiting or wheezing. She has tried decongestant for the symptoms. The treatment provided no relief. Hypertension  This is a chronic problem. The current episode started more than 1 year ago. The problem has been gradually improving since onset. The problem is controlled. Associated symptoms include headaches. Pertinent negatives include no chest pain, palpitations or shortness of breath. Past treatments include calcium channel blockers and diuretics. The current treatment provides moderate improvement. Compliance problems include exercise and diet. The following portions of the patient's history were reviewed and updated as appropriate: allergies, current medications, past family history, past medical history, past social history, past surgical history and problem list.    Review of Systems   Constitutional: Negative for activity change, appetite change, chills, diaphoresis, fatigue and fever. HENT: Positive for congestion and rhinorrhea. Negative for postnasal drip, sinus pressure, sinus pain, sneezing and sore throat. Eyes: Negative for visual disturbance. Respiratory: Positive for cough. Negative for apnea, choking, chest tightness, shortness of breath and wheezing. Cardiovascular: Negative for chest pain, palpitations and leg swelling. Gastrointestinal: Negative for abdominal distention, abdominal pain, anal bleeding, blood in stool, constipation, diarrhea, nausea and vomiting. Endocrine: Negative for cold intolerance and heat intolerance. Genitourinary: Negative for difficulty urinating, dysuria and hematuria. Musculoskeletal: Negative. Skin: Negative. Neurological: Positive for headaches. Negative for dizziness, weakness, light-headedness and numbness. Hematological: Negative for adenopathy. Psychiatric/Behavioral: Negative for agitation, sleep disturbance and suicidal ideas. All other systems reviewed and are negative.         Past Medical History:   Diagnosis Date   • Acne     resolved-12/3/2015   • Eczema     last assessed-12/3/2015   • Gastroesophageal reflux disease without esophagitis 6/28/2019   • Hypertension     last assessed-12/11/2017   • Iron deficiency anemia 5/21/2019   • Pneumonia 11/4/2019   • Subluxation of left patella     last assessed-3/31/2015         Current Outpatient Medications:   •  albuterol (Proventil HFA) 90 mcg/act inhaler, Inhale 2 puffs every 6 (six) hours as needed for wheezing, Disp: 6.7 g, Rfl: 0  •  amLODIPine (NORVASC) 5 mg tablet, Take 1 tablet (5 mg total) by mouth daily, Disp: 90 tablet, Rfl: 1  •  amoxicillin-clavulanate (AUGMENTIN) 875-125 mg per tablet, Take 1 tablet by mouth every 12 (twelve) hours for 7 days, Disp: 14 tablet, Rfl: 0  •  benzonatate (TESSALON PERLES) 100 mg capsule, Take 1 capsule (100 mg total) by mouth 3 (three) times a day as needed for cough, Disp: 20 capsule, Rfl: 0  •  Biotin w/ Vitamins C & E (HAIR/SKIN/NAILS PO), Take by mouth daily at bedtime, Disp: , Rfl:   •  Calcium Carbonate-Vitamin D 600-200 MG-UNIT TABS, Take 1 tablet by mouth daily, Disp: , Rfl:   •  clindamycin (CLEOCIN T) 1 % lotion, Apply 1 application topically 2 (two) times a day as needed (ezema), Disp: 60 mL, Rfl: 2  •  DAILY MULTIPLE VITAMINS PO, Take 1 tablet by mouth daily , Disp: , Rfl:   • ergocalciferol (VITAMIN D2) 50,000 units, Take 1 capsule (50,000 Units total) by mouth every 14 (fourteen) days, Disp: 6 capsule, Rfl: 1  •  fluticasone (FLONASE) 50 mcg/act nasal spray, 2 sprays into each nostril daily as needed, Disp: , Rfl:   •  hydrochlorothiazide (HYDRODIURIL) 25 mg tablet, Take 1 tablet (25 mg total) by mouth daily, Disp: 30 tablet, Rfl: 0  •  IRON, FERROUS GLUCONATE, PO, Take 1 tablet by mouth every other day, Disp: , Rfl:   •  levonorgestrel (KYLEENA) 19.5 MG intrauterine device, 1 Intra Uterine Device by Intrauterine route once, Disp: , Rfl:   •  potassium chloride (K-DUR,KLOR-CON) 20 mEq tablet, Take 2 tablets (40 mEq total) by mouth daily, Disp: 180 tablet, Rfl: 1  •  Probiotic Product (DIGESTIVE ADVANTAGE PO), Take 1 capsule by mouth every other day, Disp: , Rfl:   •  valACYclovir (VALTREX) 1,000 mg tablet, , Disp: , Rfl:     Allergies   Allergen Reactions   • Cefdinir Hives       Social History   Past Surgical History:   Procedure Laterality Date   • TONSILLECTOMY     • WISDOM TOOTH EXTRACTION  03/2020     Family History   Problem Relation Age of Onset   • Allergic rhinitis Mother    • Hypertension Mother    • Diabetes Mother    • Thyroid disease Maternal Grandmother    • Dementia Maternal Grandmother    • COPD Maternal Grandfather    • Asthma Maternal Grandfather    • Atrial fibrillation Maternal Grandfather    • Cancer Other    • Diabetes Other        Objective:  /84 (BP Location: Left arm, Patient Position: Sitting, Cuff Size: Large)   Pulse 73   Temp 98.1 °F (36.7 °C) (Temporal)   Resp 20   Ht 5' 4" (1.626 m)   Wt (!) 140 kg (308 lb 9.6 oz)   LMP 06/09/2023   SpO2 97%   BMI 52.97 kg/m²     Recent Results (from the past 1344 hour(s))   ECG 12 lead    Collection Time: 06/16/23  5:41 PM   Result Value Ref Range    Ventricular Rate 93 BPM    Atrial Rate 93 BPM    RI Interval 160 ms    QRSD Interval 82 ms    QT Interval 366 ms    QTC Interval 455 ms    P Axis 42 degrees QRS Axis 10 degrees    T Wave Axis 13 degrees   Fingerstick Glucose (POCT)    Collection Time: 06/16/23  5:58 PM   Result Value Ref Range    POC Glucose 88 65 - 140 mg/dl   ECG 12 lead    Collection Time: 06/16/23  6:27 PM   Result Value Ref Range    Ventricular Rate 85 BPM    Atrial Rate 85 BPM    MD Interval 164 ms    QRSD Interval 86 ms    QT Interval 376 ms    QTC Interval 447 ms    P Axis 38 degrees    QRS Axis 31 degrees    T Wave Axis -1 degrees   ECG 12 lead    Collection Time: 06/16/23  6:43 PM   Result Value Ref Range    Ventricular Rate 75 BPM    Atrial Rate 75 BPM    MD Interval 170 ms    QRSD Interval 86 ms    QT Interval 388 ms    QTC Interval 433 ms    P Axis 35 degrees    QRS Axis 32 degrees    T Wave Axis 5 degrees   CBC and differential    Collection Time: 06/16/23  6:50 PM   Result Value Ref Range    WBC 15.20 (H) 4.31 - 10.16 Thousand/uL    RBC 4.62 3.81 - 5.12 Million/uL    Hemoglobin 14.1 11.5 - 15.4 g/dL    Hematocrit 42.5 34.8 - 46.1 %    MCV 92 82 - 98 fL    MCH 30.5 26.8 - 34.3 pg    MCHC 33.2 31.4 - 37.4 g/dL    RDW 11.8 11.6 - 15.1 %    MPV 9.3 8.9 - 12.7 fL    Platelets 312 351 - 971 Thousands/uL    nRBC 0 /100 WBCs    Neutrophils Relative 70 43 - 75 %    Immat GRANS % 0 0 - 2 %    Lymphocytes Relative 22 14 - 44 %    Monocytes Relative 6 4 - 12 %    Eosinophils Relative 1 0 - 6 %    Basophils Relative 1 0 - 1 %    Neutrophils Absolute 10.61 (H) 1.85 - 7.62 Thousands/µL    Immature Grans Absolute 0.06 0.00 - 0.20 Thousand/uL    Lymphocytes Absolute 3.38 0.60 - 4.47 Thousands/µL    Monocytes Absolute 0.90 0.17 - 1.22 Thousand/µL    Eosinophils Absolute 0.18 0.00 - 0.61 Thousand/µL    Basophils Absolute 0.07 0.00 - 0.10 Thousands/µL   Basic metabolic panel    Collection Time: 06/16/23  6:50 PM   Result Value Ref Range    Sodium 135 135 - 147 mmol/L    Potassium 3.4 (L) 3.5 - 5.3 mmol/L    Chloride 101 96 - 108 mmol/L    CO2 23 21 - 32 mmol/L    ANION GAP 11 4 - 13 mmol/L    BUN 9 5 - 25 mg/dL    Creatinine 0.54 (L) 0.60 - 1.30 mg/dL    Glucose 103 65 - 140 mg/dL    Calcium 9.9 8.4 - 10.2 mg/dL    eGFR 133 ml/min/1.73sq m   HS Troponin 0hr (reflex protocol)    Collection Time: 06/16/23  6:50 PM   Result Value Ref Range    hs TnI 0hr <2 "Refer to ACS Flowchart"- see link ng/L   D-dimer, quantitative    Collection Time: 06/16/23  6:50 PM   Result Value Ref Range    D-Dimer, Quant 0.35 <0.50 ug/ml FEU   hCG, qualitative pregnancy    Collection Time: 06/16/23  6:50 PM   Result Value Ref Range    Preg, Serum Negative Negative   POCT urine HCG    Collection Time: 06/16/23  7:47 PM   Result Value Ref Range    URINE HCG negative    Basic metabolic panel    Collection Time: 06/30/23  7:34 AM   Result Value Ref Range    Sodium 136 135 - 147 mmol/L    Potassium 3.5 3.5 - 5.3 mmol/L    Chloride 105 96 - 108 mmol/L    CO2 27 21 - 32 mmol/L    ANION GAP 4 mmol/L    BUN 11 5 - 25 mg/dL    Creatinine 0.47 (L) 0.60 - 1.30 mg/dL    Glucose, Fasting 83 65 - 99 mg/dL    Calcium 8.8 8.3 - 10.1 mg/dL    eGFR 139 ml/min/1.73sq m   Magnesium    Collection Time: 06/30/23  7:34 AM   Result Value Ref Range    Magnesium 2.1 1.6 - 2.6 mg/dL            Physical Exam  Vitals and nursing note reviewed. Constitutional:       General: She is not in acute distress. Appearance: She is well-developed. She is not diaphoretic. HENT:      Head: Normocephalic and atraumatic. Eyes:      General:         Right eye: No discharge. Left eye: No discharge. Conjunctiva/sclera: Conjunctivae normal.      Pupils: Pupils are equal, round, and reactive to light. Neck:      Thyroid: No thyromegaly. Vascular: No JVD. Cardiovascular:      Rate and Rhythm: Normal rate and regular rhythm. Heart sounds: Normal heart sounds. No murmur heard. No friction rub. No gallop. Pulmonary:      Effort: Pulmonary effort is normal. No respiratory distress. Breath sounds: Normal breath sounds. No wheezing or rales.    Chest: Chest wall: No tenderness. Abdominal:      General: There is no distension. Palpations: Abdomen is soft. Tenderness: There is no abdominal tenderness. Musculoskeletal:         General: No tenderness or deformity. Normal range of motion. Cervical back: Normal range of motion and neck supple. Lymphadenopathy:      Cervical: No cervical adenopathy. Skin:     General: Skin is warm and dry. Coloration: Skin is not pale. Findings: No erythema or rash. Neurological:      Mental Status: She is alert and oriented to person, place, and time. Cranial Nerves: No cranial nerve deficit. Coordination: Coordination normal.   Psychiatric:         Behavior: Behavior normal.         Thought Content:  Thought content normal.         Judgment: Judgment normal.

## 2023-07-10 ENCOUNTER — NURSE TRIAGE (OUTPATIENT)
Dept: OTHER | Facility: OTHER | Age: 23
End: 2023-07-10

## 2023-07-10 NOTE — TELEPHONE ENCOUNTER
Patient called in to report moderate pain/burning with sexual activity and milky whtte discharge. Also has moderate itching. Reports some vaginal blood spotting for two days last week and unsure if it is regular spotting due to birth control or symptoms of infection. Requesting OV. Please follow up with patient. Reason for Disposition  • MILD-MODERATE pain and present > 24 hours (Exception: chronic pain)    Answer Assessment - Initial Assessment Questions  1. SYMPTOM: "What's the main symptom you're concerned about?" (e.g., pain, itching, dryness)      Pain and burning post sexual activity; milky color discharge  2. LOCATION: "Where is the pain located?" (e.g., inside/outside, left/right)      inside  3. ONSET: "When did the symptoms start?"      Change on Wed 7/5 and burning started on Fri. 7/7   4. PAIN: "Is there any pain?" If Yes, ask: "How bad is it?" (Scale: 1-10; mild, moderate, severe)      Moderate (7) when sexually active and voiding  5. ITCHING: "Is there any itching?" If Yes, ask: "How bad is it?" (Scale: 1-10; mild, moderate, severe)      Yes, moderate (7)  6. CAUSE: "What do you think is causing the discharge?" "Have you had the same problem before? What happened then?"      Possible yeast infection  7. OTHER SYMPTOMS: "Do you have any other symptoms?" (e.g., fever, itching, vaginal bleeding, pain with urination, injury to genital area, vaginal foreign body)      Vaginal bleeding/spotting possible normal with birth control   8.  PREGNANCY: "Is there any chance you are pregnant?" "When was your last menstrual period?"      Denies    Protocols used: VAGINAL SYMPTOMS-ADULT-[place]

## 2023-07-10 NOTE — TELEPHONE ENCOUNTER
Regarding: Possible Yeast Infection  ----- Message from Vernon Georges sent at 7/10/2023  7:24 AM EDT -----  "I think I have an yeast infection. I am having pain and a burning sensation, especially after sex. I do have some discharge as well.  I would like to be seen by my obgyn today."

## 2023-07-11 ENCOUNTER — OFFICE VISIT (OUTPATIENT)
Dept: OBGYN CLINIC | Facility: MEDICAL CENTER | Age: 23
End: 2023-07-11
Payer: COMMERCIAL

## 2023-07-11 VITALS
SYSTOLIC BLOOD PRESSURE: 132 MMHG | BODY MASS INDEX: 50.02 KG/M2 | WEIGHT: 293 LBS | DIASTOLIC BLOOD PRESSURE: 92 MMHG | HEIGHT: 64 IN

## 2023-07-11 DIAGNOSIS — N90.89 VULVAR IRRITATION: Primary | ICD-10-CM

## 2023-07-11 LAB
BV WHIFF TEST VAG QL: NORMAL
CLUE CELLS SPEC QL WET PREP: NORMAL
PH SMN: NORMAL [PH]
SL AMB POCT WET MOUNT: NORMAL
T VAGINALIS VAG QL WET PREP: NORMAL
YEAST VAG QL WET PREP: NORMAL

## 2023-07-11 PROCEDURE — 87210 SMEAR WET MOUNT SALINE/INK: CPT | Performed by: NURSE PRACTITIONER

## 2023-07-11 PROCEDURE — 99213 OFFICE O/P EST LOW 20 MIN: CPT | Performed by: NURSE PRACTITIONER

## 2023-07-11 PROCEDURE — 81514 NFCT DS BV&VAGINITIS DNA ALG: CPT | Performed by: NURSE PRACTITIONER

## 2023-07-11 NOTE — PROGRESS NOTES
Assessment/Plan:  Molecular panel collected. Will reach out with results. Wet mount normal with few lacto, no clue cells, neg whiff, neg hypae, neg trich. Bathe daily with dove bar soap, rinse well and pat dry. Coconut oil to vulva to protect skin. Call with worsening of symptoms. Return to office for annual exam and as needed. 1. Vulvar irritation  -     Molecular Vaginal Panel  -     POCT wet mount           Subjective:      Patient ID: Ara Whittaker is a 21 y.o. female. HPI    Ara Whittaker is a 21 y.o.  female who is here today for a problem visit . C/o clear white increased  vaginal discharge and vulvar itching that started one week ago. It worsened 2 days later and accompanied by burning with sex. No alleviating factors. No vaginal odor or pelvic pain. No fever, bowel or bladder concerns today. Has had loose stools in the last week. Denies new soaps, detergents or skin products. Irregular spotting with the kyleena IUD. Ara Whittaker is sexually active with  of 11 months. Denies pain, bleeding or dryness. She is monogamous. She is not interested in STD screening today. The following portions of the patient's history were reviewed and updated as appropriate: allergies, current medications, past medical history, past social history, past surgical history and problem list.    Review of Systems   Constitutional: Negative. Gastrointestinal: Negative for abdominal pain, constipation, diarrhea, nausea and vomiting. Genitourinary: Positive for dyspareunia and vaginal discharge. Negative for decreased urine volume, difficulty urinating, dysuria, frequency, genital sores, menstrual problem, pelvic pain, urgency, vaginal bleeding and vaginal pain. Musculoskeletal: Negative for arthralgias and myalgias. Skin: Negative. Hematological: Negative for adenopathy. Psychiatric/Behavioral: Negative. All other systems reviewed and are negative. Objective:      /92 (BP Location: Left arm, Patient Position: Sitting, Cuff Size: Large)   Ht 5' 3.75" (1.619 m)   Wt (!) 141 kg (311 lb)   LMP 06/09/2023   BMI 53.80 kg/m²          Physical Exam  Vitals and nursing note reviewed. Constitutional:       Appearance: Normal appearance. She is well-developed. She is obese. Genitourinary:     General: Normal vulva. Exam position: Lithotomy position. Labia:         Right: No rash, tenderness, lesion or injury. Left: No rash, tenderness, lesion or injury. Urethra: No prolapse, urethral pain, urethral swelling or urethral lesion. Vagina: No signs of injury and foreign body. Bleeding (light flow) present. No vaginal discharge, erythema or tenderness. Cervix: No cervical motion tenderness, discharge, friability, lesion, erythema or cervical bleeding. Uterus: Normal.       Adnexa: Right adnexa normal and left adnexa normal.        Right: No mass, tenderness or fullness. Left: No mass, tenderness or fullness. Rectum: No external hemorrhoid. Comments: IUD string noted at os  Physical exam limited by habitus  Lymphadenopathy:      Lower Body: No right inguinal adenopathy. No left inguinal adenopathy. Skin:     General: Skin is warm and dry. Neurological:      Mental Status: She is alert and oriented to person, place, and time.    Psychiatric:         Mood and Affect: Mood normal.         Behavior: Behavior normal.

## 2023-07-11 NOTE — PATIENT INSTRUCTIONS
Molecular panel collected. Will reach out with results. Wet mount normal with few lacto, no clue cells, neg whiff, neg hypae, neg trich. Bathe daily with dove bar soap, rinse well and pat dry. Coconut oil to vulva to protect skin. Call with worsening of symptoms. Return to office for annual exam and as needed.

## 2023-07-12 LAB
C GLABRATA DNA VAG QL NAA+PROBE: NEGATIVE
C KRUSEI DNA VAG QL NAA+PROBE: NEGATIVE
CANDIDA SP 6 PNL VAG NAA+PROBE: POSITIVE
T VAGINALIS DNA VAG QL NAA+PROBE: NEGATIVE
VAGINOSIS/ITIS DNA PNL VAG PROBE+SIG AMP: NEGATIVE

## 2023-07-14 ENCOUNTER — OFFICE VISIT (OUTPATIENT)
Dept: URGENT CARE | Facility: CLINIC | Age: 23
End: 2023-07-14
Payer: COMMERCIAL

## 2023-07-14 ENCOUNTER — NURSE TRIAGE (OUTPATIENT)
Dept: OBGYN CLINIC | Facility: MEDICAL CENTER | Age: 23
End: 2023-07-14

## 2023-07-14 ENCOUNTER — APPOINTMENT (OUTPATIENT)
Dept: RADIOLOGY | Facility: CLINIC | Age: 23
End: 2023-07-14
Payer: COMMERCIAL

## 2023-07-14 VITALS
RESPIRATION RATE: 18 BRPM | BODY MASS INDEX: 51.91 KG/M2 | DIASTOLIC BLOOD PRESSURE: 78 MMHG | HEIGHT: 63 IN | WEIGHT: 293 LBS | TEMPERATURE: 98.5 F | OXYGEN SATURATION: 100 % | HEART RATE: 93 BPM | SYSTOLIC BLOOD PRESSURE: 158 MMHG

## 2023-07-14 DIAGNOSIS — B37.31 CANDIDA VAGINITIS: Primary | ICD-10-CM

## 2023-07-14 DIAGNOSIS — S99.922A INJURY OF LEFT ANKLE AND FOOT, INITIAL ENCOUNTER: ICD-10-CM

## 2023-07-14 DIAGNOSIS — I10 ESSENTIAL HYPERTENSION: ICD-10-CM

## 2023-07-14 DIAGNOSIS — S99.912A INJURY OF LEFT ANKLE AND FOOT, INITIAL ENCOUNTER: ICD-10-CM

## 2023-07-14 DIAGNOSIS — S99.922A INJURY OF LEFT ANKLE AND FOOT, INITIAL ENCOUNTER: Primary | ICD-10-CM

## 2023-07-14 DIAGNOSIS — S99.912A INJURY OF LEFT ANKLE AND FOOT, INITIAL ENCOUNTER: Primary | ICD-10-CM

## 2023-07-14 DIAGNOSIS — B37.31 CANDIDA VAGINITIS: ICD-10-CM

## 2023-07-14 PROCEDURE — 73630 X-RAY EXAM OF FOOT: CPT

## 2023-07-14 PROCEDURE — 73610 X-RAY EXAM OF ANKLE: CPT

## 2023-07-14 PROCEDURE — 99213 OFFICE O/P EST LOW 20 MIN: CPT | Performed by: PHYSICIAN ASSISTANT

## 2023-07-14 RX ORDER — FLUCONAZOLE 150 MG/1
150 TABLET ORAL ONCE
Qty: 1 TABLET | Refills: 0 | Status: SHIPPED | OUTPATIENT
Start: 2023-07-14 | End: 2023-07-14

## 2023-07-14 RX ORDER — HYDROCHLOROTHIAZIDE 25 MG/1
TABLET ORAL
Qty: 30 TABLET | Refills: 0 | OUTPATIENT
Start: 2023-07-14

## 2023-07-14 NOTE — PATIENT INSTRUCTIONS
RICE- rest, ice, compression, elevation. Walking boot for comfort. Call tomorrow for official xray results. Call Ortho today and follow-up with them in the next 1-2 days for further evaluation and treatment.    Call [facility] to schedule an appointment: [phone]  Or the direct Ortho number at [phone] to schedule an appointment   Go to the ER immediately if any numbness, tingling, weakness, change in intensity or location of pain, calf pain or swelling, other new or concerning symptoms

## 2023-07-14 NOTE — PROGRESS NOTES
[facility]        NAME: Malina Yen is a 21 y.o. female  : 2000    MRN: [id number]  DATE: 2023  TIME: 5:01 PM    Assessment and Plan   Injury of left ankle and foot, initial encounter [S99.912A, S99.922A]  1. Injury of left ankle and foot, initial encounter  XR foot 3+ vw left    XR ankle 3+ vw left    Ambulatory Referral to Orthopedic Surgery        Xray- negative for obvious acute osseous abnormality, pending final read  Declined crutches, Walking boot- placed by medical staff for comfort, NV intact post-splinting    Patient Instructions     RICE- rest, ice, compression, elevation. Walking boot for comfort. Call tomorrow for official xray results. Call Ortho today and follow-up with them in the next 1-2 days for further evaluation and treatment. Call [facility] to schedule an appointment: [phone]  Or the direct Ortho number at [phone] to schedule an appointment   Go to the ER immediately if any numbness, tingling, weakness, change in intensity or location of pain, calf pain or swelling, other new or concerning symptoms     Chief Complaint     Chief Complaint   Patient presents with   • Ankle Pain     Landed on her ankle wrong while dancing happened last night         History of Present Illness       22-year-old female presents for evaluation of left ankle/foot pain after an injury that occurred last night. States she was at a dance class when she landed wrong on her left foot/ankle, twisting the left foot/ankle. States she did notice some swelling to the outside/lateral aspect of the left ankle. Denies any bruising, redness, warmth, abrasions or lacerations. Has tried Tylenol, ice and elevation with some improvement. States she has been walking on it today but pain is worse with walking or palpation.  Feels better at rest. Denies any numbness, tingling, weakness, calf or shin pain or swelling, chest pain, chest tightness, shortness of breath, GI/ symptoms or other complaints. Denies any pregnancy risk. Review of Systems   Review of Systems   Constitutional: Negative for chills, fatigue and fever. Respiratory: Negative for shortness of breath. Cardiovascular: Negative for chest pain. Gastrointestinal: Negative for abdominal pain, diarrhea, nausea and vomiting. Musculoskeletal: Positive for arthralgias and joint swelling. Negative for back pain and neck pain. Skin: Negative for rash and wound. Neurological: Negative for dizziness, weakness, numbness and headaches. All other systems reviewed and are negative.         Current Medications       Current Outpatient Medications:   •  albuterol (Proventil HFA) 90 mcg/act inhaler, Inhale 2 puffs every 6 (six) hours as needed for wheezing, Disp: 6.7 g, Rfl: 0  •  amLODIPine (NORVASC) 5 mg tablet, Take 1 tablet (5 mg total) by mouth daily, Disp: 90 tablet, Rfl: 1  •  Biotin w/ Vitamins C & E (HAIR/SKIN/NAILS PO), Take by mouth daily at bedtime, Disp: , Rfl:   •  Calcium Carbonate-Vitamin D 600-200 MG-UNIT TABS, Take 1 tablet by mouth daily, Disp: , Rfl:   •  clindamycin (CLEOCIN T) 1 % lotion, Apply 1 application topically 2 (two) times a day as needed (ezema), Disp: 60 mL, Rfl: 2  •  DAILY MULTIPLE VITAMINS PO, Take 1 tablet by mouth daily , Disp: , Rfl:   •  ergocalciferol (VITAMIN D2) 50,000 units, Take 1 capsule (50,000 Units total) by mouth every 14 (fourteen) days, Disp: 6 capsule, Rfl: 1  •  fluconazole (DIFLUCAN) 150 mg tablet, Take 1 tablet (150 mg total) by mouth once for 1 dose, Disp: 1 tablet, Rfl: 0  •  fluticasone (FLONASE) 50 mcg/act nasal spray, 2 sprays into each nostril daily as needed, Disp: , Rfl:   •  hydrochlorothiazide (HYDRODIURIL) 25 mg tablet, Take 1 tablet (25 mg total) by mouth daily, Disp: 30 tablet, Rfl: 0  •  IRON, FERROUS GLUCONATE, PO, Take 1 tablet by mouth every other day, Disp: , Rfl:   •  levonorgestrel (KYLEENA) 19.5 MG intrauterine device, 1 Intra Uterine Device by Intrauterine route once, Disp: , Rfl:   •  potassium chloride (K-DUR,KLOR-CON) 20 mEq tablet, Take 2 tablets (40 mEq total) by mouth daily, Disp: 180 tablet, Rfl: 1  •  Probiotic Product (DIGESTIVE ADVANTAGE PO), Take 1 capsule by mouth every other day, Disp: , Rfl:   •  benzonatate (TESSALON PERLES) 100 mg capsule, Take 1 capsule (100 mg total) by mouth 3 (three) times a day as needed for cough (Patient not taking: Reported on 7/11/2023), Disp: 20 capsule, Rfl: 0  •  valACYclovir (VALTREX) 1,000 mg tablet, , Disp: , Rfl:     Current Allergies     Allergies as of 07/14/2023 - Reviewed 07/14/2023   Allergen Reaction Noted   • Cefdinir Hives 02/18/2015            The following portions of the patient's history were reviewed and updated as appropriate: allergies, current medications, past family history, past medical history, past social history, past surgical history and problem list.     Past Medical History:   Diagnosis Date   • Acne     resolved-12/3/2015   • Eczema     last assessed-12/3/2015   • Gastroesophageal reflux disease without esophagitis 6/28/2019   • Hypertension     last assessed-12/11/2017   • Iron deficiency anemia 5/21/2019   • Pneumonia 11/4/2019   • Subluxation of left patella     last assessed-3/31/2015       Past Surgical History:   Procedure Laterality Date   • TONSILLECTOMY     • WISDOM TOOTH EXTRACTION  03/2020       Family History   Problem Relation Age of Onset   • Allergic rhinitis Mother    • Hypertension Mother    • Diabetes Mother    • Thyroid disease Maternal Grandmother    • Dementia Maternal Grandmother    • COPD Maternal Grandfather    • Asthma Maternal Grandfather    • Atrial fibrillation Maternal Grandfather    • Cancer Other    • Diabetes Other          Medications have been verified.         Objective   /78   Pulse 93   Temp 98.5 °F (36.9 °C)   Resp 18   Ht 5' 3" (1.6 m)   Wt (!) 141 kg (311 lb)   LMP 06/09/2023   SpO2 100%   BMI 55.09 kg/m²        Physical Exam Physical Exam  Vitals and nursing note reviewed. Constitutional:       General: She is not in acute distress. Appearance: Normal appearance. She is well-developed. She is not toxic-appearing. Cardiovascular:      Rate and Rhythm: Normal rate and regular rhythm. Heart sounds: Normal heart sounds. Pulmonary:      Effort: Pulmonary effort is normal.      Breath sounds: Normal breath sounds. Musculoskeletal:      Left lower leg: Normal. No swelling or tenderness. Left ankle: Swelling present. No ecchymosis or lacerations (but mild pain with varus/valgus stress). Tenderness present. Normal range of motion. Normal pulse. Left foot: Normal range of motion and normal capillary refill. Tenderness present. No swelling or deformity. Normal pulse. Feet:       Comments: Able to wiggle toes, dorsi flexion/plantarflexion intact. No calf or shin swelling or tenderness to palpation. Skin:     Capillary Refill: Capillary refill takes less than 2 seconds. Findings: No bruising or erythema. Neurological:      General: No focal deficit present. Mental Status: She is alert and oriented to person, place, and time. Sensory: Sensation is intact. Motor: Motor function is intact. Deep Tendon Reflexes: Reflexes are normal and symmetric. Comments: NV intact with sensation and strong peripheral pulses.  5/5 strength of LE bilaterally   Psychiatric:         Behavior: Behavior normal.

## 2023-07-15 NOTE — TELEPHONE ENCOUNTER
Reason for Disposition  • [1] Prescription prescribed recently is not at pharmacy AND [2] triager has access to patient's EMR AND [3] prescription is recorded in the EMR    Answer Assessment - Initial Assessment Questions  1. NAME of MEDICATION: "What medicine are you calling about?"      fluconazole (DIFLUCAN) 150 mg tablet    2. QUESTION: "What is your question?" (e.g., medication refill, side effect)      Sent to wrong pharmacy, med transfer? 3. PRESCRIBING HCP: "Who prescribed it?" Reason: if prescribed by specialist, call should be referred to that group.       Johan Toth    Protocols used: MEDICATION QUESTION CALL-ADULT-

## 2023-07-15 NOTE — TELEPHONE ENCOUNTER
Regarding: script issue  ----- Message from Ashley Ferreira sent at 7/14/2023  6:47 PM EDT -----  Pt called, "  I was supposed to receive a medication  from my pharmacy but they did not receive anything." The info from med list was checked and it was sent to [facility]."    Fluconazole 150 mg tab

## 2023-07-19 ENCOUNTER — OFFICE VISIT (OUTPATIENT)
Dept: OBGYN CLINIC | Facility: CLINIC | Age: 23
End: 2023-07-19
Payer: COMMERCIAL

## 2023-07-19 VITALS
HEART RATE: 111 BPM | SYSTOLIC BLOOD PRESSURE: 139 MMHG | TEMPERATURE: 99.8 F | DIASTOLIC BLOOD PRESSURE: 85 MMHG | BODY MASS INDEX: 51.91 KG/M2 | HEIGHT: 63 IN | WEIGHT: 293 LBS

## 2023-07-19 DIAGNOSIS — S86.012A ACHILLES TENDON SPRAIN, LEFT, INITIAL ENCOUNTER: ICD-10-CM

## 2023-07-19 PROCEDURE — 99204 OFFICE O/P NEW MOD 45 MIN: CPT | Performed by: FAMILY MEDICINE

## 2023-07-19 NOTE — PATIENT INSTRUCTIONS
F/u 3 wks  Boot as needed. Take off boot 3 x per day for gentle range of motion exercises. Icing/heat/OTC pain meds as needed.   Begin physical therapy

## 2023-07-19 NOTE — PROGRESS NOTES
[facility] [address] [address] [phone]      Assessment:  1. Achilles tendon sprain, left, initial encounter  -     Ambulatory Referral to Orthopedic Surgery  -     Ambulatory Referral to Physical Therapy; Future        Plan:  Patient Instructions   F/u 3 wks  Boot as needed. Take off boot 3 x per day for gentle range of motion exercises. Icing/heat/OTC pain meds as needed. Begin physical therapy     Return in about 3 weeks (around 8/9/2023) for Recheck. Chief Complaint:  Chief Complaint   Patient presents with   • Left Ankle - Pain       Subjective:   HPI    Patient ID: Vera Hilliard is a 21 y.o. female   Here c/o L ankle pain  She was dancing and landed wrong and felt pain in the back of the ankle  Hurts to walk/steps  Seen in UC. XR Done. Given boot. Note reviewed. Denies CP/SOB  Swelling has dec    LEFT ANKLE     INDICATION:   T79.887P: Unspecified injury of left ankle, initial encounter  A83.515I: Unspecified injury of left foot, initial encounter.     COMPARISON:  None     VIEWS:  XR ANKLE 3+ VW LEFT, XR FOOT 3+ VW LEFT  Images: 6     FINDINGS:     There is no acute fracture or dislocation.     No significant degenerative changes.     No lytic or blastic osseous lesion.     Soft tissues are unremarkable.     IMPRESSION:     No acute osseous abnormality. Review of Systems   Constitutional: Negative for fatigue and fever. Respiratory: Negative for shortness of breath. Cardiovascular: Negative for chest pain. Gastrointestinal: Negative for abdominal pain and nausea. Genitourinary: Negative for dysuria. Musculoskeletal: Positive for arthralgias and gait problem. Skin: Negative for rash and wound. Neurological: Negative for weakness and headaches.        Objective:  Vitals:  /85 (BP Location: Left arm, Patient Position: Sitting, Cuff Size: Large)   Pulse (!) 111   Temp 99.8 °F (37.7 °C) (Tympanic) Ht 5' 3" (1.6 m)   Wt (!) 142 kg (312 lb 3.2 oz)   BMI 55.30 kg/m²     The following portions of the patient's history were reviewed and updated as appropriate: allergies, current medications, past family history, past medical history, past social history, past surgical history, and problem list.    Physical exam:  Physical Exam  Constitutional:       Appearance: Normal appearance. She is normal weight. HENT:      Head: Normocephalic. Eyes:      Extraocular Movements: Extraocular movements intact. Pulmonary:      Effort: Pulmonary effort is normal.   Musculoskeletal:      Cervical back: Normal range of motion. Skin:     General: Skin is warm and dry. Neurological:      General: No focal deficit present. Mental Status: She is alert and oriented to person, place, and time. Mental status is at baseline. Psychiatric:         Mood and Affect: Mood normal.         Behavior: Behavior normal.         Thought Content: Thought content normal.         Judgment: Judgment normal.       Left Ankle Exam     Tenderness   Left ankle tenderness location: L achilles tendon TTP. Comments:  Neg morgan test  Pain with resistance to plantarflexion            I have personally reviewed pertinent films in PACS and my interpretation is XR-  L ankle/foot- no fx.       Manuel Cano MD

## 2023-07-25 NOTE — PROGRESS NOTES
PT Evaluation     Today's date: 2023  Patient name: José Luis Sloan  : 2000  MRN: [id number]  Referring provider: Jamel Mcallister MD  Dx:   Encounter Diagnosis     ICD-10-CM    1. Left ankle pain, unspecified chronicity  M25.572       2. Achilles tendon sprain, left, initial encounter  S86.012A Ambulatory Referral to Physical Therapy          Start Time: 0740  Stop Time: 0825  Total time in clinic (min): 45 minutes    Assessment  Assessment details: Patient is a 21 y.o. female who reports to outpatient physical therapy with L ankle pain. Probable movement impairment diagnosis of L achilles tendinopathy/strain resulting in pathoanatomical symptoms of L ankle/leg pain, decreased ROM, decreased strength and limiting ability to WB, walk, perform normal daily activity. Skilled physical therapy is warranted for the application of the interventions found below in the planned intervention section. Etiologic factors include mistep at dance. Prognosis is fair given HEP compliance and attendance to physical therapy 2x a week for 8 weeks. Positive prognostic indicators include active PF. Negative prognostic indicators include limited WB/ROM. Please contact me if you have any questions or recommendations. Thank you for the referral and the opportunity to share in Marlyn's care. Patient verbalized understanding of POC, HEP, and return demonstrated HEP. Impairments: abnormal gait, abnormal or restricted ROM, activity intolerance, impaired physical strength, lacks appropriate home exercise program, pain with function and weight-bearing intolerance    Goals  STG (4 weeks)  1. Patient will have reported 0/10 pain in L achilles at rest.   2. Improve patient's L DF AROM to neutral degrees for increased ability to take proper strides during ambulation. 3. Increase patient's L single leg balance to >30 seconds for increased stability on stairs. LTG (8 weeks)  1.  Patient's LE strength will be equal bilaterally for ability to ambulate and return to functional activities at [facility]. 2. Patient will be able to complete SL HR with 0/10 pain in L LE.   3. Patient will be independent with home exercise program for continued maintenance post PT discharge. Plan  Plan details: Re-Evaluation in 4 weeks. Planned therapy interventions: manual therapy, neuromuscular re-education, patient education, therapeutic activities, therapeutic exercise, home exercise program, balance and gait training  Frequency: 2x week  Duration in weeks: 12  Plan of Care beginning date: 7/26/2023  Plan of Care expiration date: 10/26/2023  Treatment plan discussed with: patient        Subjective Evaluation    History of Present Illness  Mechanism of injury: López Gusman presents to outpatient physical therapy with a referral for L ankle/achilles pain. 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 Does not feel like anything is torn. Cannot stretch achilles in back, difficult to roll through when walking. Has been walking stiff legged. Has been walking with boot. HPI/Primary Complaint: pain in base of heel to half-way up calf; pain is achey; pain when trying to move it or use it. Does not think there is any remaining swelling. Denies bruising or discoloration. Denies numbness or tingling. Wearing boot during day. Relevant PMH:   Hobbies/exercise: teaching dance  Relieving factors: ice   Patient Goals: decrease pain, return to normal activity/walking     MD Notes:   F/u 3 wks  Boot as needed. Take off boot 3 x per day for gentle range of motion exercises. Icing/heat/OTC pain meds as needed. Begin physical therapy  Return in about 3 weeks (around 8/9/2023) for Recheck. Objective     General Comments:       Ankle/Foot Comments   Palpation: TTP L GA, Ach;   Observation: mild swelling L ankle  Gait: L LE ER, avoids rocker/toe off; stiff legged to avoid GA loading/stretch  WB: decreased WB through L LE  Squat: np    MMTs:  Left LE:   DF (L4): 3-/5  EVR: 4/5  INV: 4/5  PF: 3+/5  Standing PF (S1): np/5  Great Toe (L5): 5/5    ROM:  Left Ankle:   DF (0-15):  AROM: -20, PROM: -15  PF (0-45):  AROM: WNL  EVR:  AROM: 18,   INV:  AROM: 28,     Jones test: L= negative             Flowsheet Rows    Flowsheet Row Most Recent Value   PT/OT G-Codes    Current Score 32   Projected Score 68             Precautions: n/a  HEP: [id number]  (7/26)    Manuals 7/26          L ankle PROM                                            Neuro Re-Ed           Foot arching           L ankle isometrics                                                                  Ther Ex           L GA stretch strap           L GA stretch wall           L ankle ABC           L ankle pumps           Seated HR           L ankle TB                                 Ther Activity                                 Gait Training                                 Modalities

## 2023-07-26 ENCOUNTER — EVALUATION (OUTPATIENT)
Dept: PHYSICAL THERAPY | Facility: CLINIC | Age: 23
End: 2023-07-26
Payer: COMMERCIAL

## 2023-07-26 DIAGNOSIS — S86.012A ACHILLES TENDON SPRAIN, LEFT, INITIAL ENCOUNTER: ICD-10-CM

## 2023-07-26 DIAGNOSIS — M25.572 LEFT ANKLE PAIN, UNSPECIFIED CHRONICITY: Primary | ICD-10-CM

## 2023-07-26 PROCEDURE — 97161 PT EVAL LOW COMPLEX 20 MIN: CPT

## 2023-07-31 DIAGNOSIS — I10 ESSENTIAL HYPERTENSION: ICD-10-CM

## 2023-07-31 RX ORDER — AMLODIPINE BESYLATE 5 MG/1
5 TABLET ORAL DAILY
Qty: 90 TABLET | Refills: 1 | Status: SHIPPED | OUTPATIENT
Start: 2023-07-31

## 2023-08-01 ENCOUNTER — OFFICE VISIT (OUTPATIENT)
Dept: PHYSICAL THERAPY | Facility: CLINIC | Age: 23
End: 2023-08-01
Payer: COMMERCIAL

## 2023-08-01 DIAGNOSIS — M25.572 LEFT ANKLE PAIN, UNSPECIFIED CHRONICITY: Primary | ICD-10-CM

## 2023-08-01 DIAGNOSIS — S86.012A ACHILLES TENDON SPRAIN, LEFT, INITIAL ENCOUNTER: ICD-10-CM

## 2023-08-01 PROCEDURE — 97112 NEUROMUSCULAR REEDUCATION: CPT

## 2023-08-01 PROCEDURE — 97110 THERAPEUTIC EXERCISES: CPT

## 2023-08-01 NOTE — PROGRESS NOTES
Daily Note     Today's date: 2023  Patient name: Kamron David YOB: 2000  MRN: [id number]  Referring provider: Manuel Maravilla MD  Dx:   Encounter Diagnosis     ICD-10-CM    1. Left ankle pain, unspecified chronicity  M25.572       2. Achilles tendon sprain, left, initial encounter  S86.012A           Start Time: 0803          Subjective: Patient states she had to walk a lot the last couple of days that increased her pain. The pain increase started on . Presently her pain is a 8/10. She has been doing her exercises regularly. Also she reports doing ice and elevation after work because of the pain and swelling. Objective: See treatment diary below    Patient's home exercise program was updated to include additional exercises. Handout issued and explained with demonstration. Patient accepts new exercises. Yellow theraband issued for home use. Assessment: Tolerated treatment well. Patient would benefit from continued PT for stretching and strengtheing. Patient was able add exercises to her program with little difficulty and discomfort. She seemed to understand all education given during session. Patient felt better after treatment and rated her pain a 6/10. Plan: Continue per plan of care. Progress treatment as tolerated.        Precautions: n/a  HEP: [id number]  ()    Manuals          L ankle PROM                                            Neuro Re-Ed           Foot arching  x10         L ankle isometrics           Toe yoga  x10 ea         marbles  x2 min         Toe crunch  x10                               Ther Ex           L GA stretch strap  :30x3         L GA stretch wall           L ankle ABC  HEP         L ankle pumps  HEP         Seated HR  HR/TR x10 ea         L ankle TB  Yellow x10 ea         Hamstring stretch  :30x3                    Ther Activity                                 Gait Training                                 Modalities Access Code: [email or web address]  URL: [email or web address]  Date: 08/01/2023  Prepared by: Ashley Lott    Exercises  - Long Sitting Calf Stretch with Strap  - 2 x daily - 7 x weekly - 3 reps - 30 hold  - Supine Ankle Pumps  - 2 x daily - 7 x weekly - 30 reps  - Seated Ankle Alphabet  - 2 x daily - 7 x weekly  - Seated Heel Raise  - 2 x daily - 7 x weekly - 20 reps  - Gastroc Stretch on Wall  - 2 x daily - 7 x weekly - 3 sets - 30 hold  - Seated Great Toe Extension  - 2 x daily - 7 x weekly - 1 sets - 10 reps  - Seated Lesser Toes Extension  - 2 x daily - 7 x weekly - 1 sets - 10 reps  - Seated [place] Pick-Up with Toes  - 2 x daily - 7 x weekly - 1 sets - 20 reps  - Seated Arch Lifts  - 2 x daily - 7 x weekly - 1 sets - 10 reps  - Towel Scrunches  - 2 x daily - 7 x weekly - 1 sets - 10 reps  - Ankle Dorsiflexion with Resistance  - 2 x daily - 7 x weekly - 1 sets - 10 reps  - Ankle Eversion with Resistance  - 2 x daily - 7 x weekly - 1 sets - 10 reps  - Ankle Inversion with Resistance  - 2 x daily - 7 x weekly - 1 sets - 10 reps  - Ankle and Toe Plantarflexion with Resistance  - 2 x daily - 7 x weekly - 1 sets - 10 reps

## 2023-08-03 ENCOUNTER — OFFICE VISIT (OUTPATIENT)
Dept: PHYSICAL THERAPY | Facility: CLINIC | Age: 23
End: 2023-08-03
Payer: COMMERCIAL

## 2023-08-03 DIAGNOSIS — S86.012A ACHILLES TENDON SPRAIN, LEFT, INITIAL ENCOUNTER: ICD-10-CM

## 2023-08-03 DIAGNOSIS — M25.572 LEFT ANKLE PAIN, UNSPECIFIED CHRONICITY: Primary | ICD-10-CM

## 2023-08-03 PROCEDURE — 97140 MANUAL THERAPY 1/> REGIONS: CPT | Performed by: PHYSICAL THERAPIST

## 2023-08-03 PROCEDURE — 97112 NEUROMUSCULAR REEDUCATION: CPT | Performed by: PHYSICAL THERAPIST

## 2023-08-03 PROCEDURE — 97110 THERAPEUTIC EXERCISES: CPT | Performed by: PHYSICAL THERAPIST

## 2023-08-03 NOTE — PROGRESS NOTES
Daily Note     Today's date: 8/3/2023  Patient name: Vannessa Hidalgo  : 2000  MRN: [id number]  Referring provider: Kelly Cisneros MD  Dx:   Encounter Diagnosis     ICD-10-CM    1. Left ankle pain, unspecified chronicity  M25.572       2. Achilles tendon sprain, left, initial encounter  S86.012A           Start Time: 1700  Stop Time: 1745  Total time in clinic (min): 45 minutes    Subjective: Patient states she is feeling a little better today than earlier this week where she "over did it walking" causing increased pain. Objective: See treatment diary below      Assessment: Tolerated treatment well. Introduced IASTM today to help increase muscle flexibility. Cueing on proper execution of exercises today using foot intrinsic mm vs extrinsic. Educated patient on increasing stretching frequency though out the day. Patient would benefit from continued PT      Plan: Continue per plan of care. Progress treatment as tolerated. Precautions: n/a  HEP: [id number]  ()    Manuals  8/3        L ankle PROM   KB with active release of calf        Rear foot, midfoot, TC jt mobs   Grade III-IV        IASTM   graston using GT 4,5                   Neuro Re-Ed           Foot arching  x10 10x        L ankle isometrics           Toe yoga  x10 ea 10x        marbles  x2 min         Toe crunch  x10                               Ther Ex           Nustep   Shoe L1 5 min        L GA stretch strap  :30x3 Standing GA :30x3  Soleus :15x3        L GA stretch wall           L ankle ABC  HEP         L ankle pumps  HEP         Seated HR  HR/TR x10 ea HR/TR x10 ea        L ankle TB  Yellow x10 ea         Hamstring stretch  :30x3 :30x3                   Ther Activity                                 Gait Training                                 Modalities                                       Access Code: [email or web address]  URL: [email or web address]  Date: 2023  Prepared by: Max Arteaga Frohnheiser    Exercises  - Long Sitting Calf Stretch with Strap  - 2 x daily - 7 x weekly - 3 reps - 30 hold  - Supine Ankle Pumps  - 2 x daily - 7 x weekly - 30 reps  - Seated Ankle Alphabet  - 2 x daily - 7 x weekly  - Seated Heel Raise  - 2 x daily - 7 x weekly - 20 reps  - Gastroc Stretch on Wall  - 2 x daily - 7 x weekly - 3 sets - 30 hold  - Seated Great Toe Extension  - 2 x daily - 7 x weekly - 1 sets - 10 reps  - Seated Lesser Toes Extension  - 2 x daily - 7 x weekly - 1 sets - 10 reps  - Seated [place] Pick-Up with Toes  - 2 x daily - 7 x weekly - 1 sets - 20 reps  - Seated Arch Lifts  - 2 x daily - 7 x weekly - 1 sets - 10 reps  - Towel Scrunches  - 2 x daily - 7 x weekly - 1 sets - 10 reps  - Ankle Dorsiflexion with Resistance  - 2 x daily - 7 x weekly - 1 sets - 10 reps  - Ankle Eversion with Resistance  - 2 x daily - 7 x weekly - 1 sets - 10 reps  - Ankle Inversion with Resistance  - 2 x daily - 7 x weekly - 1 sets - 10 reps  - Ankle and Toe Plantarflexion with Resistance  - 2 x daily - 7 x weekly - 1 sets - 10 reps

## 2023-08-04 DIAGNOSIS — I10 PRIMARY HYPERTENSION: Primary | ICD-10-CM

## 2023-08-04 RX ORDER — AMLODIPINE BESYLATE 5 MG/1
5 TABLET ORAL DAILY
Qty: 30 TABLET | Refills: 0 | Status: SHIPPED | OUTPATIENT
Start: 2023-08-04 | End: 2023-08-14 | Stop reason: SDUPTHER

## 2023-08-10 ENCOUNTER — OFFICE VISIT (OUTPATIENT)
Dept: PHYSICAL THERAPY | Facility: CLINIC | Age: 23
End: 2023-08-10
Payer: COMMERCIAL

## 2023-08-10 DIAGNOSIS — M25.572 LEFT ANKLE PAIN, UNSPECIFIED CHRONICITY: Primary | ICD-10-CM

## 2023-08-10 DIAGNOSIS — S86.012A ACHILLES TENDON SPRAIN, LEFT, INITIAL ENCOUNTER: ICD-10-CM

## 2023-08-10 PROCEDURE — 97112 NEUROMUSCULAR REEDUCATION: CPT | Performed by: PHYSICAL THERAPIST

## 2023-08-10 PROCEDURE — 97140 MANUAL THERAPY 1/> REGIONS: CPT | Performed by: PHYSICAL THERAPIST

## 2023-08-10 PROCEDURE — 97110 THERAPEUTIC EXERCISES: CPT | Performed by: PHYSICAL THERAPIST

## 2023-08-10 NOTE — PROGRESS NOTES
Daily Note     Today's date: 8/10/2023  Patient name: Aleksander Cooper  : 2000  MRN: [id number]  Referring provider: Elza Douglas MD  Dx:   Encounter Diagnosis     ICD-10-CM    1. Left ankle pain, unspecified chronicity  M25.572       2. Achilles tendon sprain, left, initial encounter  S86.012A           Start Time: 0730  Stop Time: 0820  Total time in clinic (min): 50 minutes    Subjective: States the past few days she hasn't been wearing the boot. Doesn't feel any different better or worse without using it. Objective: See treatment diary below      Assessment: Tolerated treatment well. Tenderness and pain with palpation to achilles tendon region. Noted lesions of tightness at achilles insertion with gastroc and soleus tightness. Patient would benefit from continued PT working on flexibility and eccentric strength. May benefit from step-down heel lift program while not in boot. Plan: Continue per plan of care. Progress treatment as tolerated.        Precautions: n/a  HEP: [id number]  ()    Manuals 7/26 8/1 8/3 8/10       L ankle PROM   KB with active release of calf        Rear foot, midfoot, TC jt mobs   Grade III-IV        IASTM   graston using GT 4,5 graston using GT 4,5, 3                  Neuro Re-Ed           Foot arching  x10 10x 20x       L ankle isometrics           Toe yoga  x10 ea 10x 20x       marbles  x2 min  2 min       Toe crunch  x10  2 min                             Ther Ex           Nustep   Shoe L1 5 min Shoe L1 8 min       L GA stretch strap  :30x3 Standing GA :30x3  Soleus :15x3 Standing GA :30x3  Soleus :15x3       L GA stretch wall           L ankle ABC  HEP         L ankle pumps  HEP         Seated HR  HR/TR x10 ea HR/TR x10 ea Standing 10x ea       L ankle TB  Yellow x10 ea  Red 2x10 ea       Hamstring stretch  :30x3 :30x3 3x:30                  Ther Activity                                 Gait Training                                 Modalities Access Code: [email or web address]  URL: [email or web address]  Date: 08/01/2023  Prepared by: Nell Lott    Exercises  - Long Sitting Calf Stretch with Strap  - 2 x daily - 7 x weekly - 3 reps - 30 hold  - Supine Ankle Pumps  - 2 x daily - 7 x weekly - 30 reps  - Seated Ankle Alphabet  - 2 x daily - 7 x weekly  - Seated Heel Raise  - 2 x daily - 7 x weekly - 20 reps  - Gastroc Stretch on Wall  - 2 x daily - 7 x weekly - 3 sets - 30 hold  - Seated Great Toe Extension  - 2 x daily - 7 x weekly - 1 sets - 10 reps  - Seated Lesser Toes Extension  - 2 x daily - 7 x weekly - 1 sets - 10 reps  - Seated [place] Pick-Up with Toes  - 2 x daily - 7 x weekly - 1 sets - 20 reps  - Seated Arch Lifts  - 2 x daily - 7 x weekly - 1 sets - 10 reps  - Towel Scrunches  - 2 x daily - 7 x weekly - 1 sets - 10 reps  - Ankle Dorsiflexion with Resistance  - 2 x daily - 7 x weekly - 1 sets - 10 reps  - Ankle Eversion with Resistance  - 2 x daily - 7 x weekly - 1 sets - 10 reps  - Ankle Inversion with Resistance  - 2 x daily - 7 x weekly - 1 sets - 10 reps  - Ankle and Toe Plantarflexion with Resistance  - 2 x daily - 7 x weekly - 1 sets - 10 reps

## 2023-08-11 ENCOUNTER — EVALUATION (OUTPATIENT)
Dept: PHYSICAL THERAPY | Facility: CLINIC | Age: 23
End: 2023-08-11
Payer: COMMERCIAL

## 2023-08-11 ENCOUNTER — OFFICE VISIT (OUTPATIENT)
Dept: OBGYN CLINIC | Facility: CLINIC | Age: 23
End: 2023-08-11
Payer: COMMERCIAL

## 2023-08-11 VITALS
TEMPERATURE: 97.6 F | BODY MASS INDEX: 51.91 KG/M2 | DIASTOLIC BLOOD PRESSURE: 89 MMHG | HEIGHT: 63 IN | WEIGHT: 293 LBS | SYSTOLIC BLOOD PRESSURE: 137 MMHG | HEART RATE: 76 BPM

## 2023-08-11 DIAGNOSIS — M25.572 LEFT ANKLE PAIN, UNSPECIFIED CHRONICITY: Primary | ICD-10-CM

## 2023-08-11 DIAGNOSIS — S86.012A ACHILLES TENDON SPRAIN, LEFT, INITIAL ENCOUNTER: ICD-10-CM

## 2023-08-11 DIAGNOSIS — S86.012D PARTIAL ACHILLES TENDON TEAR, LEFT, SUBSEQUENT ENCOUNTER: Primary | ICD-10-CM

## 2023-08-11 PROCEDURE — 97140 MANUAL THERAPY 1/> REGIONS: CPT | Performed by: PHYSICAL THERAPIST

## 2023-08-11 PROCEDURE — 97110 THERAPEUTIC EXERCISES: CPT | Performed by: PHYSICAL THERAPIST

## 2023-08-11 PROCEDURE — 99213 OFFICE O/P EST LOW 20 MIN: CPT | Performed by: FAMILY MEDICINE

## 2023-08-11 NOTE — PATIENT INSTRUCTIONS
F/u 3 wks  Continue therapy  Boot as needed. Heel lift. Icing/heat/OTC pain meds as needed. Home exercises.

## 2023-08-11 NOTE — PROGRESS NOTES
PT Re-Evaluation     Today's date: 2023  Patient name: Alanis Kearney  : 2000  MRN: [id number]  Referring provider: Carmine Quiñones MD  Dx:   Encounter Diagnosis     ICD-10-CM    1. Left ankle pain, unspecified chronicity  M25.572       2. Achilles tendon sprain, left, initial encounter  S86.012A           Start Time: 925  Stop Time: 1010  Total time in clinic (min): 45 minutes    Assessment  Assessment details: Patient is a 21 y.o. female who reports to outpatient physical therapy with L ankle pain. Probable movement impairment diagnosis of L achilles tendinopathy/strain. Patient has been seen for 4 f/u sessions and presents today for re-evaluation. Findings of examination today show improvement with left ankle range of motion, strength and decreased pain with increased walking tolerance. Noted deficits include limitation in left ankle strength and stability with continued pain and altered gait mechanics. Skilled physical therapy is warranted for the application of the interventions found below in the planned intervention section. Plan to decrease exercise frequency to 1x/week for an additional 4 weeks and continue with home exercise program independently. Etiologic factors include mistep at dance. Prognosis is fair given HEP compliance and attendance to physical therapy 2x a week for 8 weeks. Positive prognostic indicators include active PF. Negative prognostic indicators include limited WB/ROM. Please contact me if you have any questions or recommendations. Thank you for the referral and the opportunity to share in Marlyn's care. Patient verbalized understanding of POC, HEP, and return demonstrated HEP. Impairments: abnormal gait, abnormal or restricted ROM, activity intolerance, impaired physical strength, lacks appropriate home exercise program, pain with function and weight-bearing intolerance    Goals  STG (4 weeks)  1.  Patient will have reported 0/10 pain in L achilles at rest. - MET 8/11  2. Improve patient's L DF AROM to neutral degrees for increased ability to take proper strides during ambulation. - MET 8/11  3. Increase patient's L single leg balance to >30 seconds for increased stability on stairs. - NT  LTG (8 weeks)  1. Patient's LE strength will be equal bilaterally for ability to ambulate and return to functional activities at PLOF. - PROGRESSING  2. Patient will be able to complete SL HR with 0/10 pain in L LE. - NOT MET  3. Patient will be independent with home exercise program for continued maintenance post PT discharge. - [address] details: Re-Evaluation in 4 weeks. Planned therapy interventions: manual therapy, neuromuscular re-education, patient education, therapeutic activities, therapeutic exercise, home exercise program, balance and gait training  Frequency: 1x week  Duration in weeks: 12  Plan of Care beginning date: 7/26/2023  Plan of Care expiration date: 10/26/2023  Treatment plan discussed with: patient        Subjective Evaluation    History of Present Illness  Subjective 8/11: Patient states 70% improvement since the start of PT. Has been without CAM boot for the last 2 days without any increased pain. States she woke up this am feeling "pretty good". No longer has constant pain, but will increase with WB (walking/standing/stairs). Most pain with initial stand. To f.u with referring provider later today. Pain Rating (0-10) 8/11   Current 3   At least 0   At worst 7   Location Posterior ankle   Quality Sharp pulling   Alleviate sitting   Aggravate  WB           Mechanism of injury: Favian Fletcher presents to outpatient physical therapy with a referral for L ankle/achilles pain. 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 Does not feel like anything is torn. Cannot stretch achilles in back, difficult to roll through when walking. Has been walking stiff legged. Has been walking with boot.       HPI/Primary Complaint: pain in base of heel to long term up calf; pain is achey; pain when trying to move it or use it. Does not think there is any remaining swelling. Denies bruising or discoloration. Denies numbness or tingling. Wearing boot during day. Relevant PMH:   Hobbies/exercise: teaching dance  Relieving factors: ice   Patient Goals: decrease pain, return to normal activity/walking     MD Notes:   F/u 3 wks  Boot as needed. Take off boot 3 x per day for gentle range of motion exercises. Icing/heat/OTC pain meds as needed. Begin physical therapy  Return in about 3 weeks (around 8/9/2023) for Recheck. Objective     General Comments:       Ankle/Foot Comments   Palpation: TTP L GA, Ach;   8/11: TTP at L achillies insertion   Observation: mild swelling L ankle  8/11: WNL  Gait: L LE ER, avoids rocker/toe off; stiff legged to avoid GA loading/stretch  8/11: improved heel toe walking with shoe, DC CAM boot  WB: decreased WB through L LE    Squat: np        Ankle - MMT IE 8/11   LEFT     Plantarflexion 3+/5 4/5   Dorsiflexion 3/5 4/5   Inversion 4/5 5/5   Eversion 4/5 4-/5   Standing PF NT Unable in SL   Great toe  5/5        Ankle - AROM IE 8/11   LEFT     Plantarflexion WNL 60   Dorsiflexion -20 5   Inversion 28 30   Eversion 18 10     Ankle - PROM IE 8/11   LEFT     Plantarflexion  70   Dorsiflexion -15 10   Inversion  WNL   Eversion  15         Jones test: L= negative              Precautions: n/a  HEP: [id number]  (7/26)  [id number]    Manuals 7/26 8/1 8/3 8/10 8/11      L ankle PROM   KB with active release of calf        Rear foot, midfoot, TC jt mobs   Grade III-IV        IASTM   graston using GT 4,5 graston using GT 4,5, 3 graston using GT 4,5, 3                 Neuro Re-Ed           Foot arching  x10 10x 20x       L ankle isometrics           Toe yoga  x10 ea 10x 20x       marbles  x2 min  2 min       Toe crunch  x10  2 min                             Ther Ex           Nustep   Shoe L1 5 min Shoe L1 8 min 10 min L2      L GA stretch strap  :30x3 Standing GA :30x3  Soleus :15x3 Standing GA :30x3  Soleus :15x3 Wedge :30x3  Soleus :15x3      L GA stretch wall           L ankle ABC  HEP         L ankle pumps  HEP         Seated HR  HR/TR x10 ea HR/TR x10 ea Standing 10x ea - DL HR 10x    - DL HR off step 10x    - ecc HR off step :20x3      L ankle TB  Yellow x10 ea  Red 2x10 ea       Hamstring stretch  :30x3 :30x3 3x:30                  Ther Activity                                 Gait Training                                 Modalities                                       Access Code: [email or web address]  URL: [email or web address]  Date: 08/01/2023  Prepared by: Antonio Lott    Exercises  - Long Sitting Calf Stretch with Strap  - 2 x daily - 7 x weekly - 3 reps - 30 hold  - Supine Ankle Pumps  - 2 x daily - 7 x weekly - 30 reps  - Seated Ankle Alphabet  - 2 x daily - 7 x weekly  - Seated Heel Raise  - 2 x daily - 7 x weekly - 20 reps  - Gastroc Stretch on Wall  - 2 x daily - 7 x weekly - 3 sets - 30 hold  - Seated Great Toe Extension  - 2 x daily - 7 x weekly - 1 sets - 10 reps  - Seated Lesser Toes Extension  - 2 x daily - 7 x weekly - 1 sets - 10 reps  - Seated [place] Pick-Up with Toes  - 2 x daily - 7 x weekly - 1 sets - 20 reps  - Seated Arch Lifts  - 2 x daily - 7 x weekly - 1 sets - 10 reps  - Towel Scrunches  - 2 x daily - 7 x weekly - 1 sets - 10 reps  - Ankle Dorsiflexion with Resistance  - 2 x daily - 7 x weekly - 1 sets - 10 reps  - Ankle Eversion with Resistance  - 2 x daily - 7 x weekly - 1 sets - 10 reps  - Ankle Inversion with Resistance  - 2 x daily - 7 x weekly - 1 sets - 10 reps  - Ankle and Toe Plantarflexion with Resistance  - 2 x daily - 7 x weekly - 1 sets - 10 reps

## 2023-08-11 NOTE — PROGRESS NOTES
[facility] [address] [id number] [phone]      Assessment:  1. Partial Achilles tendon tear, left, subsequent encounter        Plan:  Patient Instructions   F/u 3 wks  Continue therapy  Boot as needed. Heel lift. Icing/heat/OTC pain meds as needed. Home exercises. Return in about 3 weeks (around 9/1/2023) for Recheck. Chief Complaint:  Chief Complaint   Patient presents with   • Left Ankle - Follow-up       Subjective:   HPI    Patient ID: Umm Cifuentes is a 21 y.o. female     Here for f/u L achilles strain  Still having pain but less  Doing more  Stopped wearing boot Wed  Going to PT  Hurts to walk/steps without shoes    Review of Systems   Constitutional: Negative for fatigue and fever. Respiratory: Negative for shortness of breath. Cardiovascular: Negative for chest pain. Gastrointestinal: Negative for abdominal pain and nausea. Genitourinary: Negative for dysuria. Musculoskeletal: Positive for arthralgias. Skin: Negative for rash and wound. Neurological: Negative for weakness and headaches. Objective:  Vitals:  /89 (BP Location: Left arm, Patient Position: Sitting, Cuff Size: Standard)   Pulse 76   Temp 97.6 °F (36.4 °C) (Tympanic)   Ht 5' 3" (1.6 m)   Wt (!) 142 kg (312 lb 3.2 oz)   BMI 55.30 kg/m²     The following portions of the patient's history were reviewed and updated as appropriate: allergies, current medications, past family history, past medical history, past social history, past surgical history, and problem list.    Physical exam:  Physical Exam  Left Ankle Exam     Tenderness   Left ankle tenderness location: distal achilles tendon TTP. Range of Motion   The patient has normal left ankle ROM. Muscle Strength   The patient has normal left ankle strength.     Comments:  Pain with double foot raise  Unable to perform single foot raise          Strength/Myotome Testing     Left Ankle/Foot   Normal strength            Melly Cordon MD

## 2023-08-14 ENCOUNTER — OFFICE VISIT (OUTPATIENT)
Dept: INTERNAL MEDICINE CLINIC | Age: 23
End: 2023-08-14
Payer: COMMERCIAL

## 2023-08-14 VITALS
BODY MASS INDEX: 50.02 KG/M2 | HEIGHT: 64 IN | WEIGHT: 293 LBS | HEART RATE: 83 BPM | OXYGEN SATURATION: 99 % | TEMPERATURE: 100.3 F | DIASTOLIC BLOOD PRESSURE: 84 MMHG | SYSTOLIC BLOOD PRESSURE: 138 MMHG

## 2023-08-14 DIAGNOSIS — B02.9 HERPES ZOSTER WITHOUT COMPLICATION: Primary | ICD-10-CM

## 2023-08-14 PROBLEM — J06.9 URI, ACUTE: Status: RESOLVED | Noted: 2018-05-14 | Resolved: 2023-08-14

## 2023-08-14 PROCEDURE — 99213 OFFICE O/P EST LOW 20 MIN: CPT | Performed by: FAMILY MEDICINE

## 2023-08-14 NOTE — PROGRESS NOTES
Assessment/Plan:    1. Herpes zoster without complication  -     triamcinolone (KENALOG) 0.1 % ointment; Apply topically 2 (two) times a day            There are no Patient Instructions on file for this visit. Return for Next scheduled follow up. Subjective:      Patient ID: Maryjo Cabrera is a 21 y.o. female. Chief Complaint   Patient presents with   • Rash     Patient c/o an itchy rash on her left lower back that comes and goes since 7/30/23. She had insect bites 2 days prior and she accidentally took 2 BP pills the day it started. HPI     Answers for HPI/ROS submitted by the patient on 8/9/2023  nail changes: No    Started with rash on July 30, itchy but not painful, no recent illnesses or stress or any other issues. Not spreading. Has pictures of it from that time.     The following portions of the patient's history were reviewed and updated as appropriate: allergies, current medications, past family history, past medical history, past social history, past surgical history and problem list.    Review of Systems      Constitutional:  Denies fever or chills   Eyes:  Denies double , blurry vision or eye pain  HENT:  Denies nasal congestion, sore throat or new hearing issues  Respiratory:  Denies cough or shortness of breath or wheezing  Cardiovascular:  Denies palpitations or chest pain  GI:  Denies abdominal pain, nausea, or vomiting, no loose stools, no reflux  Integument:  Denies rash , no open areas  Neurologic:  Denies headache or focal weakness, no dizziness  : no dysuria, or hematuria      Current Outpatient Medications   Medication Sig Dispense Refill   • albuterol (Proventil HFA) 90 mcg/act inhaler Inhale 2 puffs every 6 (six) hours as needed for wheezing 6.7 g 0   • amLODIPine (NORVASC) 5 mg tablet Take 1 tablet (5 mg total) by mouth daily 90 tablet 1   • Biotin w/ Vitamins C & E (HAIR/SKIN/NAILS PO) Take by mouth daily at bedtime     • Calcium Carbonate-Vitamin D 600-200 MG-UNIT TABS Take 1 tablet by mouth daily     • clindamycin (CLEOCIN T) 1 % lotion Apply 1 application topically 2 (two) times a day as needed (ezema) 60 mL 2   • DAILY MULTIPLE VITAMINS PO Take 1 tablet by mouth daily      • ergocalciferol (VITAMIN D2) 50,000 units Take 1 capsule (50,000 Units total) by mouth every 14 (fourteen) days 6 capsule 1   • fluticasone (FLONASE) 50 mcg/act nasal spray 2 sprays into each nostril daily as needed     • hydrochlorothiazide (HYDRODIURIL) 25 mg tablet Take 1 tablet (25 mg total) by mouth daily 30 tablet 0   • IRON, FERROUS GLUCONATE, PO Take 1 tablet by mouth every other day     • levonorgestrel (KYLEENA) 19.5 MG intrauterine device 1 Intra Uterine Device by Intrauterine route once     • potassium chloride (K-DUR,KLOR-CON) 20 mEq tablet Take 2 tablets (40 mEq total) by mouth daily 180 tablet 1   • Probiotic Product (DIGESTIVE ADVANTAGE PO) Take 1 capsule by mouth every other day     • triamcinolone (KENALOG) 0.1 % ointment Apply topically 2 (two) times a day 30 g 3   • valACYclovir (VALTREX) 1,000 mg tablet        No current facility-administered medications for this visit.        Objective:    /84 (BP Location: Left arm, Patient Position: Sitting, Cuff Size: Large)   Pulse 83   Temp 100.3 °F (37.9 °C) (Tympanic)   Ht 5' 4" (1.626 m)   Wt (!) 142 kg (313 lb 4.8 oz)   SpO2 99%   BMI 53.78 kg/m²        Physical Exam         Constitutional:  Well developed, well nourished, no acute distress, non-toxic appearance   Eyes:  PERRL, conjunctiva normal , non icteric sclera  HENT:  Atraumatic, oropharynx moist. Neck-  supple   Respiratory:  CTA b/l, normal breath sounds, no rales, no wheezing   Cardiovascular:  RRR, no murmurs, no LE edema b/l  GI:  Soft, nondistended, normal bowel sounds x 4, nontender, no organomegaly, no mass, no rebound, no guarding   Neurologic:  no focal deficits noted   Psychiatric:  Speech and behavior appropriate , AAO x 3                Noralyn Pour, DO

## 2023-08-15 ENCOUNTER — APPOINTMENT (OUTPATIENT)
Dept: PHYSICAL THERAPY | Facility: CLINIC | Age: 23
End: 2023-08-15
Payer: COMMERCIAL

## 2023-08-29 DIAGNOSIS — I10 ESSENTIAL HYPERTENSION: ICD-10-CM

## 2023-08-29 RX ORDER — AMLODIPINE BESYLATE 5 MG/1
5 TABLET ORAL DAILY
Qty: 30 TABLET | OUTPATIENT
Start: 2023-08-29

## 2023-08-30 ENCOUNTER — EVALUATION (OUTPATIENT)
Dept: PHYSICAL THERAPY | Facility: CLINIC | Age: 23
End: 2023-08-30
Payer: COMMERCIAL

## 2023-08-30 DIAGNOSIS — M25.572 LEFT ANKLE PAIN, UNSPECIFIED CHRONICITY: Primary | ICD-10-CM

## 2023-08-30 DIAGNOSIS — S86.012A ACHILLES TENDON SPRAIN, LEFT, INITIAL ENCOUNTER: ICD-10-CM

## 2023-08-30 PROCEDURE — 97110 THERAPEUTIC EXERCISES: CPT | Performed by: PHYSICAL THERAPIST

## 2023-08-30 PROCEDURE — 97112 NEUROMUSCULAR REEDUCATION: CPT | Performed by: PHYSICAL THERAPIST

## 2023-08-30 NOTE — PROGRESS NOTES
Daily Note     Today's date: 2023  Patient name: Marysol Sethi  : 2000  MRN: [id number]  Referring provider: Lindy Sandoval MD  Dx:   Encounter Diagnosis     ICD-10-CM    1. Left ankle pain, unspecified chronicity  M25.572       2. Achilles tendon sprain, left, initial encounter  S86.012A           Start Time: 0730  Stop Time: 0815  Total time in clinic (min): 45 minutes    Subjective: Patient no longer wearing boot, has transitioned to sneaker. States pain when descending stairs and after prolonged walking. Has not attempted using heel lift yet. Objective: See treatment diary below      Assessment: Tolerated treatment well. Discussed treatment plan with patient today including eccentric loading of achilles, stretching of gastroc and soleus, balance, and heel lifts. Print out of exercises was given with questions addressed. Patient would benefit from continued PT      Plan: Continue per plan of care. Progress treatment as tolerated. Patient will be going OOT and will plan a re-evaluation upon return with possible discharge.       Precautions: n/a  HEP: [id number]  ()  [id number]    Manuals 7/26 8/1 8/3 8/10 8/11 8/30     L ankle PROM   KB with active release of calf        Rear foot, midfoot, TC jt mobs   Grade III-IV        IASTM   graston using GT 4,5 graston using GT 4,5, 3 graston using GT 4,5, 3 deferred                Neuro Re-Ed           Foot arching  x10 10x 20x       L ankle isometrics           Toe yoga  x10 ea 10x 20x       marbles  x2 min  2 min       Toe crunch  x10  2 min       airex      rhomberg :30x3 EC     SLB      SL EC :30x3     SL clock balance      Toe taps 2x     Ther Ex           Nustep   Shoe L1 5 min Shoe L1 8 min 10 min L2 10 min L2     L GA stretch strap  :30x3 Standing GA :30x3  Soleus :15x3 Standing GA :30x3  Soleus :15x3 Wedge :30x3  Soleus :15x3 Wedge :30x3  Soleus :30x3     L GA stretch wall           L ankle ABC  HEP         L ankle pumps  HEP Seated HR  HR/TR x10 ea HR/TR x10 ea Standing 10x ea - DL HR 10x    - DL HR off step 10x    - ecc HR off step :20x3 - DL HR 10x    - DL HR off step 10x    - ecc HR off step :20x3    - ecc HR (2 up, 1 down) 10x     L ankle TB  Yellow x10 ea  Red 2x10 ea  reviewed     Hamstring stretch  :30x3 :30x3 3x:30                  Ther Activity                                 Gait Training                                 Modalities                                       Access Code: [email or web address]  URL: [email or web address]  Date: 08/01/2023  Prepared by: Ebonie Lott    Exercises  - Long Sitting Calf Stretch with Strap  - 2 x daily - 7 x weekly - 3 reps - 30 hold  - Supine Ankle Pumps  - 2 x daily - 7 x weekly - 30 reps  - Seated Ankle Alphabet  - 2 x daily - 7 x weekly  - Seated Heel Raise  - 2 x daily - 7 x weekly - 20 reps  - Gastroc Stretch on Wall  - 2 x daily - 7 x weekly - 3 sets - 30 hold  - Seated Great Toe Extension  - 2 x daily - 7 x weekly - 1 sets - 10 reps  - Seated Lesser Toes Extension  - 2 x daily - 7 x weekly - 1 sets - 10 reps  - Seated [place] Pick-Up with Toes  - 2 x daily - 7 x weekly - 1 sets - 20 reps  - Seated Arch Lifts  - 2 x daily - 7 x weekly - 1 sets - 10 reps  - Towel Scrunches  - 2 x daily - 7 x weekly - 1 sets - 10 reps  - Ankle Dorsiflexion with Resistance  - 2 x daily - 7 x weekly - 1 sets - 10 reps  - Ankle Eversion with Resistance  - 2 x daily - 7 x weekly - 1 sets - 10 reps  - Ankle Inversion with Resistance  - 2 x daily - 7 x weekly - 1 sets - 10 reps  - Ankle and Toe Plantarflexion with Resistance  - 2 x daily - 7 x weekly - 1 sets - 10 reps

## 2023-09-01 DIAGNOSIS — I10 ESSENTIAL HYPERTENSION: ICD-10-CM

## 2023-09-01 RX ORDER — HYDROCHLOROTHIAZIDE 25 MG/1
25 TABLET ORAL DAILY
Qty: 90 TABLET | Refills: 1 | Status: SHIPPED | OUTPATIENT
Start: 2023-09-01

## 2023-09-01 RX ORDER — AMLODIPINE BESYLATE 5 MG/1
5 TABLET ORAL DAILY
Qty: 90 TABLET | Refills: 1 | Status: SHIPPED | OUTPATIENT
Start: 2023-09-01

## 2023-09-08 ENCOUNTER — OFFICE VISIT (OUTPATIENT)
Dept: OBGYN CLINIC | Facility: CLINIC | Age: 23
End: 2023-09-08
Payer: COMMERCIAL

## 2023-09-08 VITALS
WEIGHT: 293 LBS | HEIGHT: 64 IN | SYSTOLIC BLOOD PRESSURE: 148 MMHG | HEART RATE: 87 BPM | DIASTOLIC BLOOD PRESSURE: 94 MMHG | TEMPERATURE: 98.5 F | BODY MASS INDEX: 50.02 KG/M2

## 2023-09-08 DIAGNOSIS — S99.912D ANKLE INJURY, LEFT, SUBSEQUENT ENCOUNTER: ICD-10-CM

## 2023-09-08 DIAGNOSIS — M76.72 PERONEAL TENDONITIS, LEFT: ICD-10-CM

## 2023-09-08 DIAGNOSIS — G89.29 CHRONIC PAIN OF LEFT ANKLE: ICD-10-CM

## 2023-09-08 DIAGNOSIS — M25.572 CHRONIC PAIN OF LEFT ANKLE: ICD-10-CM

## 2023-09-08 DIAGNOSIS — S86.012D PARTIAL ACHILLES TENDON TEAR, LEFT, SUBSEQUENT ENCOUNTER: Primary | ICD-10-CM

## 2023-09-08 PROCEDURE — 99214 OFFICE O/P EST MOD 30 MIN: CPT | Performed by: FAMILY MEDICINE

## 2023-09-08 NOTE — PROGRESS NOTES
[facility] [address] [address] [phone]      Assessment:  1. Partial Achilles tendon tear, left, subsequent encounter  -     MRI ankle/heel left  wo contrast; Future; Expected date: 09/08/2023  -     Brace    2. Ankle injury, left, subsequent encounter  -     MRI ankle/heel left  wo contrast; Future; Expected date: 09/08/2023  -     Brace    3. Chronic pain of left ankle  -     MRI ankle/heel left  wo contrast; Future; Expected date: 09/08/2023  -     Brace    4. Peroneal tendonitis, left  -     Brace        Plan:  Patient Instructions   F/u after MRI  MRI L ankle-  L ankle pain/PT for 6 wks/home exercises daily for 6 wks. XR neg. chronic pain. Continue home exercises. Heel lift while dancing. Ankle brace. Return for f/u after MRI L ankle, Recheck. Chief Complaint:  Chief Complaint   Patient presents with   • Left Ankle - Follow-up       Subjective:   HPI    Patient ID: Edelmira Martinez is a 21 y.o. female     Here for f/u L achilles strain  Finished therapy  Doing home exercises. Wearing heel lift for prolonged walking. Still having pain but less  Doing more  Swells up  Hurts to walk/steps without shoes       Review of Systems   Constitutional: Negative for fatigue and fever. Respiratory: Negative for shortness of breath. Cardiovascular: Negative for chest pain. Gastrointestinal: Negative for abdominal pain and nausea. Genitourinary: Negative for dysuria. Musculoskeletal: Positive for arthralgias. Skin: Positive for rash. Negative for wound. Neurological: Negative for weakness and headaches.        Objective:  Vitals:  /94 (BP Location: Right arm, Patient Position: Sitting, Cuff Size: Standard)   Pulse 87   Temp 98.5 °F (36.9 °C) (Tympanic)   Ht 5' 4" (1.626 m)   Wt (!) 142 kg (312 lb 12.8 oz)   BMI 53.69 kg/m²     The following portions of the patient's history were reviewed and updated as appropriate: allergies, current medications, past family history, past medical history, past social history, past surgical history, and problem list.    Physical exam:  Physical Exam  Constitutional:       Appearance: Normal appearance. She is normal weight. HENT:      Head: Normocephalic. Eyes:      Extraocular Movements: Extraocular movements intact. Pulmonary:      Effort: Pulmonary effort is normal.   Musculoskeletal:         General: Tenderness present. Cervical back: Normal range of motion. Skin:     General: Skin is warm and dry. Neurological:      General: No focal deficit present. Mental Status: She is alert and oriented to person, place, and time. Mental status is at baseline. Psychiatric:         Mood and Affect: Mood normal.         Behavior: Behavior normal.         Thought Content: Thought content normal.         Judgment: Judgment normal.       Left Ankle Exam     Tenderness   Left ankle tenderness location: distal achilles/peroneal tendon TTP. Muscle Strength   The patient has normal left ankle strength.     Comments:  Pain with inversion          Strength/Myotome Testing     Left Ankle/Foot   Normal strength            John Mcfarlane MD

## 2023-09-08 NOTE — PATIENT INSTRUCTIONS
F/u after MRI  MRI L ankle-  L ankle pain/PT for 6 wks/home exercises daily for 6 wks. XR neg. chronic pain. Continue home exercises. Heel lift while dancing. Ankle brace.

## 2023-09-18 ENCOUNTER — TELEPHONE (OUTPATIENT)
Dept: OBGYN CLINIC | Facility: HOSPITAL | Age: 23
End: 2023-09-18

## 2023-09-18 DIAGNOSIS — M76.72 PERONEAL TENDONITIS, LEFT: ICD-10-CM

## 2023-09-18 DIAGNOSIS — S99.912D ANKLE INJURY, LEFT, SUBSEQUENT ENCOUNTER: ICD-10-CM

## 2023-09-18 DIAGNOSIS — G89.29 CHRONIC PAIN OF LEFT ANKLE: ICD-10-CM

## 2023-09-18 DIAGNOSIS — S86.012D PARTIAL ACHILLES TENDON TEAR, LEFT, SUBSEQUENT ENCOUNTER: Primary | ICD-10-CM

## 2023-09-18 DIAGNOSIS — M25.572 CHRONIC PAIN OF LEFT ANKLE: ICD-10-CM

## 2023-09-18 NOTE — TELEPHONE ENCOUNTER
Caller: Patient    Doctor: John Guzman    Reason for call: PT is requesting a new Rx. Can we please put in chart? Thanks!     Call back#: [phone]

## 2023-09-22 ENCOUNTER — EVALUATION (OUTPATIENT)
Dept: PHYSICAL THERAPY | Facility: CLINIC | Age: 23
End: 2023-09-22
Payer: COMMERCIAL

## 2023-09-22 DIAGNOSIS — M25.572 LEFT ANKLE PAIN, UNSPECIFIED CHRONICITY: Primary | ICD-10-CM

## 2023-09-22 DIAGNOSIS — S86.012A ACHILLES TENDON SPRAIN, LEFT, INITIAL ENCOUNTER: ICD-10-CM

## 2023-09-22 PROCEDURE — 97110 THERAPEUTIC EXERCISES: CPT | Performed by: PHYSICAL THERAPIST

## 2023-09-22 PROCEDURE — 97112 NEUROMUSCULAR REEDUCATION: CPT | Performed by: PHYSICAL THERAPIST

## 2023-09-22 NOTE — PROGRESS NOTES
PT Re-Evaluation     Today's date: 2023  Patient name: Harrison Guzmán  : 2000  MRN: [id number]  Referring provider: Holly Hidalgo MD  Dx:   Encounter Diagnosis     ICD-10-CM    1. Left ankle pain, unspecified chronicity  M25.572       2. Achilles tendon sprain, left, initial encounter  S86.012A           Start Time: 915  Stop Time:   Total time in clinic (min): 40 minutes    Assessment  Assessment details: Patient is a 21 y.o. female who reports to outpatient physical therapy with L ankle pain. Probable movement impairment diagnosis of L achilles tendinopathy/strain. Patient has been seen for 6 f/u sessions and presents today for re-evaluation. Evaluation performed today shows return to baseline active range of motion with the exception of full range DF, improvement in LE balance/proprioception and overall reduction of pain intensity and occurrence. Reviewed home exercise program and will hold PT until f/u with referring provider to review MRI. Etiologic factors include mistep at dance. Prognosis is fair given HEP compliance and attendance to physical therapy 2x a week for 8 weeks. Positive prognostic indicators include active PF. Negative prognostic indicators include limited WB/ROM. Please contact me if you have any questions or recommendations. Thank you for the referral and the opportunity to share in Marlyn's care. Patient verbalized understanding of POC, HEP, and return demonstrated HEP. Impairments: abnormal gait, abnormal or restricted ROM, activity intolerance, impaired physical strength, lacks appropriate home exercise program, pain with function and weight-bearing intolerance    Goals  STG (4 weeks)  1. Patient will have reported 0/10 pain in L achilles at rest. - MET   2. Improve patient's L DF AROM to neutral degrees for increased ability to take proper strides during ambulation. - MET   3.  Increase patient's L single leg balance to >30 seconds for increased stability on stairs. - MET 9/22  LTG (8 weeks)  1. Patient's LE strength will be equal bilaterally for ability to ambulate and return to functional activities at PLOF. - PROGRESSING  2. Patient will be able to complete SL HR with 0/10 pain in L LE. - PROGRESSING  3. Patient will be independent with home exercise program for continued maintenance post PT discharge. - [address] details: Re-Evaluation in 4 weeks. Planned therapy interventions: manual therapy, neuromuscular re-education, patient education, therapeutic activities, therapeutic exercise, home exercise program, balance and gait training  Frequency: 1x week  Duration in weeks: 12  Plan of Care beginning date: 7/26/2023  Plan of Care expiration date: 10/26/2023  Treatment plan discussed with: patient        Subjective Evaluation    History of Present Illness  Subjective 9/22: Patient states 75% improvement since the start of PT. Continues to have increased pain in am and when navigating stairs, but overall has had decreased pain occurrence throughout the day. Is scheduled for MRI 9/25 with f/u with sports medicine 10/6. Subjective 8/11: Patient states 70% improvement since the start of PT. Has been without CAM boot for the last 2 days without any increased pain. States she woke up this am feeling "pretty good". No longer has constant pain, but will increase with WB (walking/standing/stairs). Most pain with initial stand. To f.u with referring provider later today. Pain Rating (0-10) 8/11 9/22   Current 3 2   At least 0 0   At worst 7 7   Location Posterior ankle Lateral ankle   Quality Sharp pulling    Alleviate sitting    Aggravate  WB Stairs, am           Mechanism of injury: Nighat Santa presents to outpatient physical therapy with a referral for L ankle/achilles pain. 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 Does not feel like anything is torn.  Cannot stretch achilles in back, difficult to roll through when walking. Has been walking stiff legged. Has been walking with boot. HPI/Primary Complaint: pain in base of heel to long term up calf; pain is achey; pain when trying to move it or use it. Does not think there is any remaining swelling. Denies bruising or discoloration. Denies numbness or tingling. Wearing boot during day. Relevant PMH:   Hobbies/exercise: teaching dance  Relieving factors: ice   Patient Goals: decrease pain, return to normal activity/walking     MD Notes:   F/u 3 wks  Boot as needed. Take off boot 3 x per day for gentle range of motion exercises. Icing/heat/OTC pain meds as needed. Begin physical therapy  Return in about 3 weeks (around 8/9/2023) for Recheck. Objective     General Comments: Ankle/Foot Comments   Palpation: TTP L GA, Ach;   8/11: TTP at L achillies insertion  9/22: TTP at post calcaneus      Observation: mild swelling L ankle  8/11: WNL      Gait: L LE ER, avoids rocker/toe off; stiff legged to avoid GA loading/stretch  8/11: improved heel toe walking with shoe, DC CAM boot  9/22: No boot, baseline    Squat: np  9/22: baseline    SLB:  9/22: R= 15 sec L=30 sec        Ankle - MMT IE 8/11 9/22   LEFT      Plantarflexion 3+/5 4/5 4+/5   Dorsiflexion 3/5 4/5 5/5   Inversion 4/5 5/5 5/5   Eversion 4/5 4-/5 4+/5   Standing PF NT Unable in % of contralat.    Great toe  5/5         Ankle - AROM IE 8/11 9/22   LEFT      Plantarflexion WNL 60 60   Dorsiflexion -20 5 5   Inversion 28 30 40   Eversion 18 10 15     Ankle - PROM IE 8/11 9/22   LEFT      Plantarflexion  70    Dorsiflexion -15 10 10   Inversion  WNL    Eversion  15          Jones test: L= negative          Precautions: n/a  HEP: [id number]  (7/26)  3AKGJNDE    Manuals 8/3 8/10 8/11 8/30 9/22    L ankle PROM KB with active release of calf        Rear foot, midfoot, TC jt mobs Grade III-IV        IASTM graston using GT 4,5 graston using GT 4,5, 3 graston using GT 4,5, 3 deferred              Neuro Re-Ed     HEP review    Foot arching 10x 20x       L ankle isometrics         Toe yoga 10x 20x       marbles  2 min       Toe crunch  2 min       airex    rhomberg :30x3 EC     SLB    SL EC :30x3     SL clock balance    Toe taps 2x     Ther Ex     HEP review    Nustep Shoe L1 5 min Shoe L1 8 min 10 min L2 10 min L2 5 min L5  5 min L2    L GA stretch strap Standing GA :30x3  Soleus :15x3 Standing GA :30x3  Soleus :15x3 Wedge :30x3  Soleus :15x3 Wedge :30x3  Soleus :30x3     L GA stretch wall         L ankle ABC         L ankle pumps         Seated HR HR/TR x10 ea Standing 10x ea - DL HR 10x    - DL HR off step 10x    - ecc HR off step :20x3 - DL HR 10x    - DL HR off step 10x    - ecc HR off step :20x3    - ecc HR (2 up, 1 down) 10x     L ankle TB  Red 2x10 ea  reviewed     Hamstring stretch :30x3 3x:30       1/2 kneel DF mob     :10x10 L    Ther Activity                           Gait Training                           Modalities                                 Access Code: [email or web address]  URL: [email or web address]  Date: 08/01/2023  Prepared by: Lexy Lott    Exercises  - Long Sitting Calf Stretch with Strap  - 2 x daily - 7 x weekly - 3 reps - 30 hold  - Supine Ankle Pumps  - 2 x daily - 7 x weekly - 30 reps  - Seated Ankle Alphabet  - 2 x daily - 7 x weekly  - Seated Heel Raise  - 2 x daily - 7 x weekly - 20 reps  - Gastroc Stretch on Wall  - 2 x daily - 7 x weekly - 3 sets - 30 hold  - Seated Great Toe Extension  - 2 x daily - 7 x weekly - 1 sets - 10 reps  - Seated Lesser Toes Extension  - 2 x daily - 7 x weekly - 1 sets - 10 reps  - Seated [place] Pick-Up with Toes  - 2 x daily - 7 x weekly - 1 sets - 20 reps  - Seated Arch Lifts  - 2 x daily - 7 x weekly - 1 sets - 10 reps  - Towel Scrunches  - 2 x daily - 7 x weekly - 1 sets - 10 reps  - Ankle Dorsiflexion with Resistance  - 2 x daily - 7 x weekly - 1 sets - 10 reps  - Ankle Eversion with Resistance  - 2 x daily - 7 x weekly - 1 sets - 10 reps  - Ankle Inversion with Resistance  - 2 x daily - 7 x weekly - 1 sets - 10 reps  - Ankle and Toe Plantarflexion with Resistance  - 2 x daily - 7 x weekly - 1 sets - 10 reps

## 2023-09-24 ENCOUNTER — HOSPITAL ENCOUNTER (OUTPATIENT)
Dept: MRI IMAGING | Facility: HOSPITAL | Age: 23
Discharge: HOME | End: 2023-09-24
Attending: FAMILY MEDICINE
Payer: COMMERCIAL

## 2023-09-24 DIAGNOSIS — M25.572 CHRONIC PAIN OF LEFT ANKLE: ICD-10-CM

## 2023-09-24 DIAGNOSIS — G89.29 CHRONIC PAIN OF LEFT ANKLE: ICD-10-CM

## 2023-09-24 DIAGNOSIS — S99.912D ANKLE INJURY, LEFT, SUBSEQUENT ENCOUNTER: ICD-10-CM

## 2023-09-24 DIAGNOSIS — S86.012D PARTIAL ACHILLES TENDON TEAR, LEFT, SUBSEQUENT ENCOUNTER: ICD-10-CM

## 2023-09-24 PROCEDURE — 73721 MRI JNT OF LWR EXTRE W/O DYE: CPT

## 2023-09-24 PROCEDURE — G1004 CDSM NDSC: HCPCS

## 2023-09-25 ENCOUNTER — TELEPHONE (OUTPATIENT)
Age: 23
End: 2023-09-25

## 2023-09-25 NOTE — TELEPHONE ENCOUNTER
CLM on VM and relayed Dr Jeovanny Jameson. Further advised pt I am unable to give her the results of MRI and it will be discussed at the next appt. She can call back with further questions if needed.

## 2023-09-25 NOTE — TELEPHONE ENCOUNTER
Caller: Eilene Goltz     Doctor: Barbra Castano / [facility]     Reason for call: Patient relayed message from Dr Barbra Castano. Patient verbalized understanding but requesting call back from nurse to explain what the MRI results are. Patient is scheduled for follow up visit on 10/06 and would like to be pro-active .     Please call to advise   Call back#: [phone]

## 2023-10-06 ENCOUNTER — OFFICE VISIT (OUTPATIENT)
Dept: OBGYN CLINIC | Facility: CLINIC | Age: 23
End: 2023-10-06
Payer: COMMERCIAL

## 2023-10-06 VITALS
BODY MASS INDEX: 50.02 KG/M2 | SYSTOLIC BLOOD PRESSURE: 136 MMHG | HEART RATE: 76 BPM | WEIGHT: 293 LBS | HEIGHT: 64 IN | TEMPERATURE: 99.3 F | DIASTOLIC BLOOD PRESSURE: 83 MMHG

## 2023-10-06 DIAGNOSIS — S99.912D ANKLE INJURY, LEFT, SUBSEQUENT ENCOUNTER: ICD-10-CM

## 2023-10-06 DIAGNOSIS — S86.012D PARTIAL ACHILLES TENDON TEAR, LEFT, SUBSEQUENT ENCOUNTER: Primary | ICD-10-CM

## 2023-10-06 PROCEDURE — 99214 OFFICE O/P EST MOD 30 MIN: CPT | Performed by: FAMILY MEDICINE

## 2023-10-06 NOTE — PROGRESS NOTES
[facility] [address] [id number] [phone]      Assessment:  1. Partial Achilles tendon tear, left, subsequent encounter    2. Ankle injury, left, subsequent encounter        Plan:  Patient Instructions   F/u as needed  Continue home exercises. Boot only as needed for a short time  Heel lift  Icing/heat/OTC pain meds as needed. Return if symptoms worsen or fail to improve. Chief Complaint:  Chief Complaint   Patient presents with   • Left Ankle - Follow-up     Follow up to MRI left ankle/heel       Subjective:   HPI    Patient ID: Garett Mancia is a 21 y.o. female     Here for f/u L achilles strain/MRI  Still has pain- worse in the am  Swells at the end of the day  No pain walking  Worse with going on toes. Doing home exercises. Wearing heel lift for prolonged walking. MRI ANKLE/HEEL LEFT WO CONTRAST     INDICATION:   S86.012D: Strain of left Achilles tendon, subsequent encounter  S99.912D: Unspecified injury of left ankle, subsequent encounter  M25.572: Pain in left ankle and joints of left foot  G89.29: Other chronic pain.     COMPARISON: 7/14/2023     TECHNIQUE:   Multiplanar/multisequence MR of the left ankle was performed.        FINDINGS:     Subcutaneous Tissues: Mild subcutaneous edema about the ankle.     Joint Effusion: None.     TENDONS:  Achilles tendon: Mild insertional tendinosis with low-grade interstitial tearing of the distal tendon central fibers and insertion. There is mild associated retrocalcaneal bursitis.   Peroneus brevis and longus: Normal.  Posterior tibialis, flexor digitorum longus, flexor hallucis longus: Normal.  Anterior tibialis, extensor digitorum longus, extensor hallucis longus:  Normal.     LIGAMENTS:  Lateral collateral ligament complex:  Normal.  Distal tibiofibular syndesmosis:  Normal.  Medial collateral ligament complex:  Normal.     Plantar Fascia:  Normal.     Articular Surfaces:  Normal.     Sinus Tarsi:  Normal.     Tarsal Tunnel: Unremarkable.     Bones:  Normal signal intensity.     Musculature:  Normal.     IMPRESSION:     Mild Achilles insertional tendinosis with low-grade interstitial tearing of the distal tendon and insertion. No high-grade Achilles tear identified.     There is mild associated retrocalcaneal bursitis.        Workstation performed: [id number]         Review of Systems   Constitutional: Negative for fatigue and fever. Respiratory: Negative for shortness of breath. Cardiovascular: Negative for chest pain. Gastrointestinal: Negative for abdominal pain and nausea. Genitourinary: Negative for dysuria. Musculoskeletal: Positive for arthralgias. Skin: Negative for rash and wound. Neurological: Negative for weakness and headaches. Objective:  Vitals:  /83 (BP Location: Left arm, Patient Position: Sitting, Cuff Size: Standard)   Pulse 76   Temp 99.3 °F (37.4 °C) (Tympanic)   Ht 5' 4" (1.626 m)   Wt (!) 142 kg (312 lb)   BMI 53.55 kg/m²     The following portions of the patient's history were reviewed and updated as appropriate: allergies, current medications, past family history, past medical history, past social history, past surgical history, and problem list.    Physical exam:  Physical Exam  Constitutional:       Appearance: Normal appearance. She is normal weight. HENT:      Head: Normocephalic. Eyes:      Extraocular Movements: Extraocular movements intact. Pulmonary:      Effort: Pulmonary effort is normal.   Musculoskeletal:      Cervical back: Normal range of motion. Skin:     General: Skin is warm and dry. Neurological:      General: No focal deficit present. Mental Status: She is alert and oriented to person, place, and time. Mental status is at baseline. Psychiatric:         Mood and Affect: Mood normal.         Behavior: Behavior normal.         Thought Content:  Thought content normal.         Judgment: Judgment normal.       Left Ankle Exam     Tenderness   Left ankle tenderness location: posterior lateral ankle TTP. Range of Motion   The patient has normal left ankle ROM. Muscle Strength   The patient has normal left ankle strength. Comments:  Pain with single leg raise  No pain with resistance to plantarflexion          Strength/Myotome Testing     Left Ankle/Foot   Normal strength      I have personally reviewed pertinent films in PACS and my interpretation is MRI L ankle-  mild achilles insertional tendinosis with low grade interstitial tearing of the distal tendon and insertion. Miles Hernandez MD

## 2023-10-06 NOTE — PATIENT INSTRUCTIONS
F/u as needed  Continue home exercises. Boot only as needed for a short time  Heel lift  Icing/heat/OTC pain meds as needed.

## 2023-10-20 ENCOUNTER — RA CDI HCC (OUTPATIENT)
Dept: OTHER | Facility: HOSPITAL | Age: 23
End: 2023-10-20

## 2023-10-20 NOTE — PROGRESS NOTES
[address] [facility] coding opportunities          Chart Reviewed number of suggestions sent to Provider: 1     Patients Insurance        Commercial Insurance: Chaka Owen

## 2023-10-25 ENCOUNTER — APPOINTMENT (OUTPATIENT)
Dept: LAB | Facility: CLINIC | Age: 23
End: 2023-10-25
Payer: COMMERCIAL

## 2023-10-25 LAB
25(OH)D3 SERPL-MCNC: 32.6 NG/ML (ref 30–100)
ALBUMIN SERPL BCP-MCNC: 4.1 G/DL (ref 3.5–5)
ALP SERPL-CCNC: 44 U/L (ref 34–104)
ALT SERPL W P-5'-P-CCNC: 12 U/L (ref 7–52)
ANION GAP SERPL CALCULATED.3IONS-SCNC: 8 MMOL/L
AST SERPL W P-5'-P-CCNC: 14 U/L (ref 13–39)
BASOPHILS # BLD AUTO: 0.05 THOUSANDS/ÂΜL (ref 0–0.1)
BASOPHILS NFR BLD AUTO: 1 % (ref 0–1)
BILIRUB SERPL-MCNC: 0.42 MG/DL (ref 0.2–1)
BUN SERPL-MCNC: 11 MG/DL (ref 5–25)
CALCIUM SERPL-MCNC: 9.1 MG/DL (ref 8.4–10.2)
CHLORIDE SERPL-SCNC: 105 MMOL/L (ref 96–108)
CHOLEST SERPL-MCNC: 127 MG/DL
CO2 SERPL-SCNC: 27 MMOL/L (ref 21–32)
CREAT SERPL-MCNC: 0.46 MG/DL (ref 0.6–1.3)
EOSINOPHIL # BLD AUTO: 0.11 THOUSAND/ÂΜL (ref 0–0.61)
EOSINOPHIL NFR BLD AUTO: 1 % (ref 0–6)
ERYTHROCYTE [DISTWIDTH] IN BLOOD BY AUTOMATED COUNT: 12.2 % (ref 11.6–15.1)
GFR SERPL CREATININE-BSD FRML MDRD: 140 ML/MIN/1.73SQ M
GLUCOSE P FAST SERPL-MCNC: 78 MG/DL (ref 65–99)
HCT VFR BLD AUTO: 39 % (ref 34.8–46.1)
HDLC SERPL-MCNC: 53 MG/DL
HGB BLD-MCNC: 13.5 G/DL (ref 11.5–15.4)
IMM GRANULOCYTES # BLD AUTO: 0.04 THOUSAND/UL (ref 0–0.2)
IMM GRANULOCYTES NFR BLD AUTO: 0 % (ref 0–2)
LDLC SERPL CALC-MCNC: 60 MG/DL (ref 0–100)
LYMPHOCYTES # BLD AUTO: 2.93 THOUSANDS/ÂΜL (ref 0.6–4.47)
LYMPHOCYTES NFR BLD AUTO: 28 % (ref 14–44)
MCH RBC QN AUTO: 31 PG (ref 26.8–34.3)
MCHC RBC AUTO-ENTMCNC: 34.6 G/DL (ref 31.4–37.4)
MCV RBC AUTO: 90 FL (ref 82–98)
MONOCYTES # BLD AUTO: 0.64 THOUSAND/ÂΜL (ref 0.17–1.22)
MONOCYTES NFR BLD AUTO: 6 % (ref 4–12)
NEUTROPHILS # BLD AUTO: 6.63 THOUSANDS/ÂΜL (ref 1.85–7.62)
NEUTS SEG NFR BLD AUTO: 64 % (ref 43–75)
NRBC BLD AUTO-RTO: 0 /100 WBCS
PLATELET # BLD AUTO: 421 THOUSANDS/UL (ref 149–390)
PMV BLD AUTO: 9.2 FL (ref 8.9–12.7)
POTASSIUM SERPL-SCNC: 3.8 MMOL/L (ref 3.5–5.3)
PROT SERPL-MCNC: 7.3 G/DL (ref 6.4–8.4)
RBC # BLD AUTO: 4.35 MILLION/UL (ref 3.81–5.12)
SODIUM SERPL-SCNC: 140 MMOL/L (ref 135–147)
T4 FREE SERPL-MCNC: 0.93 NG/DL (ref 0.61–1.12)
TRIGL SERPL-MCNC: 71 MG/DL
TSH SERPL DL<=0.05 MIU/L-ACNC: 5.17 UIU/ML (ref 0.45–4.5)
WBC # BLD AUTO: 10.4 THOUSAND/UL (ref 4.31–10.16)

## 2023-10-27 ENCOUNTER — OFFICE VISIT (OUTPATIENT)
Dept: INTERNAL MEDICINE CLINIC | Age: 23
End: 2023-10-27
Payer: COMMERCIAL

## 2023-10-27 VITALS
WEIGHT: 293 LBS | DIASTOLIC BLOOD PRESSURE: 80 MMHG | TEMPERATURE: 98 F | BODY MASS INDEX: 48.82 KG/M2 | OXYGEN SATURATION: 99 % | SYSTOLIC BLOOD PRESSURE: 138 MMHG | HEIGHT: 65 IN | HEART RATE: 83 BPM

## 2023-10-27 DIAGNOSIS — I10 PRIMARY HYPERTENSION: Primary | ICD-10-CM

## 2023-10-27 DIAGNOSIS — Z23 ENCOUNTER FOR IMMUNIZATION: ICD-10-CM

## 2023-10-27 DIAGNOSIS — E03.8 SUBCLINICAL HYPOTHYROIDISM: ICD-10-CM

## 2023-10-27 DIAGNOSIS — E87.6 HYPOKALEMIA: ICD-10-CM

## 2023-10-27 DIAGNOSIS — E55.9 VITAMIN D DEFICIENCY: ICD-10-CM

## 2023-10-27 PROCEDURE — 90471 IMMUNIZATION ADMIN: CPT

## 2023-10-27 PROCEDURE — 99214 OFFICE O/P EST MOD 30 MIN: CPT | Performed by: FAMILY MEDICINE

## 2023-10-27 PROCEDURE — 90686 IIV4 VACC NO PRSV 0.5 ML IM: CPT

## 2023-10-27 NOTE — PROGRESS NOTES
Assessment/Plan:    1. Primary hypertension  -     Basic metabolic panel; Future    2. Hypokalemia    3. Vitamin D deficiency    4. Subclinical hypothyroidism  -     TSH, 3rd generation; Future  -     T4, free; Future    5. BMI 50.0-59.9, adult ([facility])  -     Semaglutide-Weight Management (WEGOVY) 0.25 MG/0.5ML; Inject 0.5 mL (0.25 mg total) under the skin once a week    6. Encounter for immunization  -     influenza vaccine, quadrivalent, 0.5 mL, preservative-free, for adult and pediatric patients 6 mos+ (AFLURIA, FLUARIX, FLULAVAL, FLUZONE)      BMI Counseling: Body mass index is 51.92 kg/m². The BMI is above normal. Nutrition recommendations include moderation in carbohydrate intake. Exercise recommendations include exercising 3-5 times per week. No pharmacotherapy was ordered. Rationale for BMI follow-up plan is due to patient being overweight or obese. Advised patient that we can stop hydrochlorothiazide and try an alternate medication for better blood pressure control and in this way we can stop potassium supplementation as we feel she is having side effect of thiazide, she would like to hold off on this for now and not change any medications. Trial Wegovy since patient BMI is almost 46at 21years old of age and has sudden cardiac death family history as well as hypertension  There are no Patient Instructions on file for this visit. Return in about 6 months (around 4/27/2024). Subjective:      Patient ID: Chico Spears is a 21 y.o. female. Chief Complaint   Patient presents with    Follow-up     6 m f/u visit for hypertension, review labs from 10/25/23. HPI    Hypertension, used to follow up with a pediatric cardiologist for hypertension but recently is now out of the age range. Like to discuss changing medications due to risk of fetal side effects. She is not currently sexually active and has no plans of conceiving but would like to stay on the safe side.   Her blood pressures have been well controlled and she was diagnosed at the age of 6. She does not know the details of that and old records are not available from [facility]. She does not check her blood pressure at home but occasionally does when she goes to the store and is well controlled  July 2019, was seen for a routine physical in June and blood pressure was elevated. At that time methyldopa was increased from b.i.d. To t.i.d. However she states that she often forgets the middle dose and has been routinely taking it b.i.d. Lucy Powell Blood pressure today is is well controlled. She states home blood pressures are well controlled. Tolerating it well January 2020, compliant with medication and tolerating it well. Home blood pressure readings are not checked. 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  August 2020, has been working 3 jobs and will soon start her 27 year in college. Lost 10 lb but is not quite sure how. She remains very active and is taking her medications and is compliant  April 2021, blood pressure medications were changed from methyldopa to amlodipine about 1 month ago. She has been tracking her blood pressures which have been okay but occasionally she has elevated diastolic blood pressures. She is tolerating the medication without any problems and has not gained weight. 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 She also works in [employer] as a  so she is always moving. She states she does not have a high salt diet but eats lunch me almost every day and sometimes uses the packaged sausages. May 2021, new medication hydrochlorothiazide started 6 weeks ago. Significant improvement in blood pressure however home readings still elevated but better in general.  She did not bring in her blood pressure machine today to have it checked. She feels well and has no side effects with the new medication.   She is taking hydrochlorothiazide and 5 mg of amlodipine daily   August 2021, blood pressure very well controlled. Taking her medications and has not really changed anything in her diet. No side effects and she is compliant  March 2022, blood pressure well controlled. Has slight intermittent ankle edema with medications but has been doing more dancing at school on thought it may be from that. She is compliant. Last echocardiogram was May 2021 which was normal.   September 2022, compliant with medications. Has gained slight weight. No lower extremity edema but states with long car rides when she goes to visit relatives do exacerbate this. She is not checking blood pressure at home. March 2023, blood pressure well controlled, has minimal ankle edema with long car rides and standing on her feet all day long or if she sitting all day at work. October 2023, hydrochlorothiazide started this year for better diastolic control and helping with lower extremity edema which is significantly helped however it has resulted in hypokalemia and patient taking oral potassium supplementation daily. Blood pressures at home have been well controlled, echocardiogram was done in the last 2 years     Vitamin-D deficiency, last check March 2018, level is 24. Taking over-the-counter vitamin-D daily. July 2019, taking weekly vitamin-D and tolerating well. January 2020, levels of finally improved to 37.8. Taking vitamin-D as directed. Weekly  August 2020, taking weekly vitamin-D without any issues. Had recent lab work done  April 2021, takes 94877 International Units every 2 weeks. She is due for lab work. March 2022, taking 39168 International Units every 2 weeks and is compliant. Due for lab work.   September 2022, due for lab work  March 2023, compliant with every other week vitamin D and due for blood work next September October 2023, vitamin D in range with supplementation    The following portions of the patient's history were reviewed and updated as appropriate: allergies, current medications, past family history, past medical history, past social history, past surgical history and problem list.    Review of Systems      Constitutional:  Denies fever or chills   Eyes:  Denies double , blurry vision or eye pain  HENT:  Denies nasal congestion, sore throat or new hearing issues  Respiratory:  Denies cough or shortness of breath or wheezing  Cardiovascular:  Denies palpitations or chest pain  GI:  Denies abdominal pain, nausea, or vomiting, no loose stools, no reflux  Integument:  Denies rash , no open areas  Neurologic:  Denies headache or focal weakness, no dizziness  : no dysuria, or hematuria      Current Outpatient Medications   Medication Sig Dispense Refill    amLODIPine (NORVASC) 5 mg tablet Take 1 tablet (5 mg total) by mouth daily 90 tablet 1    Biotin w/ Vitamins C & E (HAIR/SKIN/NAILS PO) Take by mouth daily at bedtime      Calcium Carbonate-Vitamin D 600-200 MG-UNIT TABS Take 1 tablet by mouth daily      clindamycin (CLEOCIN T) 1 % lotion Apply 1 application topically 2 (two) times a day as needed (ezema) 60 mL 2    DAILY MULTIPLE VITAMINS PO Take 1 tablet by mouth daily       ergocalciferol (VITAMIN D2) 50,000 units Take 1 capsule (50,000 Units total) by mouth every 14 (fourteen) days 6 capsule 1    fluticasone (FLONASE) 50 mcg/act nasal spray 2 sprays into each nostril daily as needed      hydrochlorothiazide (HYDRODIURIL) 25 mg tablet Take 1 tablet (25 mg total) by mouth daily 90 tablet 1    IRON, FERROUS GLUCONATE, PO Take 1 tablet by mouth every other day      levonorgestrel (KYLEENA) 19.5 MG intrauterine device 1 Intra Uterine Device by Intrauterine route once      potassium chloride (K-DUR,KLOR-CON) 20 mEq tablet Take 2 tablets (40 mEq total) by mouth daily 180 tablet 1    Probiotic Product (DIGESTIVE ADVANTAGE PO) Take 1 capsule by mouth every other day      Semaglutide-Weight Management (WEGOVY) 0.25 MG/0.5ML Inject 0.5 mL (0.25 mg total) under the skin once a week 2 mL 3    triamcinolone (KENALOG) 0.1 % ointment Apply topically 2 (two) times a day 30 g 3    albuterol (Proventil HFA) 90 mcg/act inhaler Inhale 2 puffs every 6 (six) hours as needed for wheezing 6.7 g 0    valACYclovir (VALTREX) 1,000 mg tablet        No current facility-administered medications for this visit.        Objective:    /80 (BP Location: Left arm, Patient Position: Sitting, Cuff Size: Large)   Pulse 83   Temp 98 °F (36.7 °C) (Tympanic)   Ht 5' 5" (1.651 m)   Wt (!) 142 kg (312 lb)   SpO2 99%   BMI 51.92 kg/m²        Physical Exam         Constitutional:  Well developed, well nourished, no acute distress, non-toxic appearance   Eyes:  PERRL, conjunctiva normal , non icteric sclera  HENT:  Atraumatic, oropharynx moist. Neck-  supple   Respiratory:  CTA b/l, normal breath sounds, no rales, no wheezing   Cardiovascular:  RRR, no murmurs, no LE edema b/l  GI:  Soft, nondistended, normal bowel sounds x 4, nontender, no organomegaly, no mass, no rebound, no guarding   Neurologic:  no focal deficits noted   Psychiatric:  Speech and behavior appropriate , AAO x 3      Henretta Snare, DO

## 2023-10-30 ENCOUNTER — TELEPHONE (OUTPATIENT)
Dept: INTERNAL MEDICINE CLINIC | Age: 23
End: 2023-10-30

## 2023-10-30 NOTE — TELEPHONE ENCOUNTER
Received prior authorization for medication:        Semaglutide-Weight Management (WEGOVY) 0.25 MG/0.5ML     Scanning into media and sending to [facility]

## 2023-11-14 ENCOUNTER — VBI (OUTPATIENT)
Dept: ADMINISTRATIVE | Facility: OTHER | Age: 23
End: 2023-11-14

## 2023-12-01 DIAGNOSIS — E55.9 VITAMIN D DEFICIENCY: ICD-10-CM

## 2023-12-01 RX ORDER — ERGOCALCIFEROL 1.25 MG/1
50000 CAPSULE ORAL
Qty: 6 CAPSULE | Refills: 1 | Status: SHIPPED | OUTPATIENT
Start: 2023-12-01

## 2023-12-01 NOTE — TELEPHONE ENCOUNTER
Patient called needing a refills sent to [address] [place] [address], [id number] [facility]   She states she has no refills left     NOV: 5/3/24

## 2023-12-20 ENCOUNTER — TELEPHONE (OUTPATIENT)
Dept: INTERNAL MEDICINE CLINIC | Age: 23
End: 2023-12-20

## 2023-12-20 ENCOUNTER — OFFICE VISIT (OUTPATIENT)
Dept: INTERNAL MEDICINE CLINIC | Age: 23
End: 2023-12-20
Payer: COMMERCIAL

## 2023-12-20 VITALS
SYSTOLIC BLOOD PRESSURE: 160 MMHG | HEART RATE: 87 BPM | DIASTOLIC BLOOD PRESSURE: 80 MMHG | BODY MASS INDEX: 50.02 KG/M2 | HEIGHT: 64 IN | TEMPERATURE: 98.2 F | OXYGEN SATURATION: 98 % | WEIGHT: 293 LBS

## 2023-12-20 DIAGNOSIS — J06.9 UPPER RESPIRATORY TRACT INFECTION, UNSPECIFIED TYPE: Primary | ICD-10-CM

## 2023-12-20 LAB
SARS-COV-2 AG UPPER RESP QL IA: NEGATIVE
VALID CONTROL: NORMAL

## 2023-12-20 PROCEDURE — 99213 OFFICE O/P EST LOW 20 MIN: CPT

## 2023-12-20 PROCEDURE — 87811 SARS-COV-2 COVID19 W/OPTIC: CPT

## 2023-12-20 PROCEDURE — 87636 SARSCOV2 & INF A&B AMP PRB: CPT

## 2023-12-20 RX ORDER — GUAIFENESIN/DEXTROMETHORPHAN 100-10MG/5
5 SYRUP ORAL 3 TIMES DAILY PRN
Qty: 118 ML | Refills: 0 | Status: SHIPPED | OUTPATIENT
Start: 2023-12-20 | End: 2023-12-30

## 2023-12-20 NOTE — TELEPHONE ENCOUNTER
Patient called because cough medication that was prescribed is not available at the pharmacy.   She would like to know if there is anything else that can be sent to [facility] her main symptoms which are sore throat & headache.

## 2023-12-20 NOTE — PROGRESS NOTES
[facility]  INTERNAL MEDICINE OFFICE VISIT     PATIENT INFORMATION     Marlyn Chapin   23 y.o. female   MRN: [id number]    ASSESSMENT/PLAN     Diagnoses and all orders for this visit:    Upper respiratory tract infection, unspecified type  -     dextromethorphan-guaiFENesin (ROBITUSSIN DM)  mg/5 mL syrup; Take 5 mL by mouth 3 (three) times a day as needed for cough for up to 10 days      Upper Respiratory Tract Infection  -Patient presents with a 3 day history of upper respiratory symptoms. Per patient, her symptoms started Monday with a fever and body aches - temperature 102 to 104 per patient report.   -Has also had sore throat, nonproductive cough, headache since then. The fever and aches have resolved but the other symptoms are not improving.   -Home covid test negative.  -Received covid and influenza vaccines.  -Afebrile in the office today, appears comfortable with clear respirations.    Plan:  -Likely viral URI - recommend supportive care.  -Continue over the counter medications as needed.  -Recommend good hydration.  -Covid/Flu swab performed during visit today.  -Prescribed Robitussin DM 5 ml TID PRN for up to 10 days.  -Routine follow up as previously scheduled.    Schedule a follow-up appointment in as scheduled.    HEALTH MAINTENANCE     Immunization History   Administered Date(s) Administered    COVID-19 MODERNA VACC 0.5 ML IM 03/17/2021, 04/16/2021, 10/28/2021, 04/11/2022    COVID-19 PFIZER VACCINE 0.3 ML IM 11/04/2022    COVID-19, unspecified 04/11/2022    DTaP 2000, 2000, 2000, 04/21/2001, 03/31/2005    DTaP 5 2000, 2000, 2000, 04/21/2001, 03/31/2005    HPV Quadrivalent 04/25/2013, 06/26/2013, 10/21/2013    Hep A, adult 07/31/2008, 04/01/2010    Hep B, Adolescent or Pediatric 2000, 2000, 2000    Hep B, adult 2000, 2000, 2000    Hepatitis A 07/31/2008, 04/01/2010    HiB 2000, 2000, 2000, 06/21/2001    Hib  (PRP-OMP) 2000, 2000, 2000, 06/21/2001    INFLUENZA 10/15/2002, 11/18/2002, 11/11/2003, 01/18/2005, 11/09/2008, 10/30/2015, 12/02/2016, 11/06/2017, 10/30/2020, 10/01/2021, 10/24/2022    IPV 2000, 2000, 09/20/2001, 03/31/2005    Influenza Quadrivalent Preservative Free 3 years and older IM 10/15/2002, 11/11/2003, 01/18/2005, 11/09/2008, 11/06/2017    Influenza Quadrivalent, 6-35 Months IM 10/30/2015, 12/02/2016    Influenza, injectable, quadrivalent, preservative free 0.5 mL 11/27/2019, 10/24/2022, 10/27/2023    Influenza, recombinant, quadrivalent,injectable, preservative free 11/23/2018    MMR 03/30/2001, 03/31/2004    Meningococcal B, Recombinant (TRUMENBA) 05/30/2018, 07/25/2018, 01/08/2019    Meningococcal MCV4, Unspecified 04/08/2011, 06/20/2017    Meningococcal MCV4P 04/08/2011, 06/20/2017    Meningococcal, Unknown Serogroups 04/08/2011, 06/20/2017    Pneumococcal Conjugate PCV 7 03/30/2001    Pneumococcal Polysaccharide PPV23 2000, 2000, 2000    Tdap 04/08/2011, 08/09/2021    Tuberculin Skin Test-PPD Intradermal 03/21/2016, 06/11/2018    Varicella 03/30/2001, 07/31/2008     CHIEF COMPLAINT     Chief Complaint   Patient presents with    Cold Like Symptoms     Fever on Monday-Tuesday pm, cough, sore throat and headache- Patient tested negaitve for covid on 12/18      HISTORY OF PRESENT ILLNESS     Patient is a 22 y/o female with PMH including hypertension, obesity who presents today with a 3 day history of upper respiratory symptoms. Per patient, her symptoms started Monday with a fever and body aches - temperature 102 to 104 per patient report. She has also had sore throat, cough, headache since then. The fever and aches have resolved but the other symptoms are not improving. Her cough is nonproductive. She took a home covid test that was negative. Patient has received the flu vaccine and covid vaccine prior. No smoking, alcohol or recreational drug use. No  "further complaints.    REVIEW OF SYSTEMS     Review of Systems   Constitutional:  Positive for chills, fatigue and fever.   HENT:  Positive for sore throat. Negative for hearing loss.    Eyes:  Negative for visual disturbance.   Respiratory:  Positive for cough. Negative for shortness of breath.    Cardiovascular:  Negative for chest pain, palpitations and leg swelling.   Gastrointestinal:  Negative for abdominal distention, abdominal pain, constipation, diarrhea and nausea.   Endocrine: Negative for polyuria.   Genitourinary:  Negative for hematuria.   Musculoskeletal:  Negative for arthralgias and back pain.   Skin:  Negative for rash and wound.   Neurological:  Positive for headaches. Negative for dizziness and light-headedness.   Psychiatric/Behavioral:  The patient is not nervous/anxious.      OBJECTIVE     Vitals:    12/20/23 1512   BP: 160/80   BP Location: Left arm   Patient Position: Sitting   Cuff Size: Adult   Pulse: 87   Temp: 98.2 °F (36.8 °C)   TempSrc: Temporal   SpO2: 98%   Weight: (!) 142 kg (314 lb)   Height: 5' 4\" (1.626 m)     Physical Exam  Vitals reviewed.   Constitutional:       Appearance: Normal appearance.   HENT:      Head: Normocephalic and atraumatic.      Right Ear: External ear normal.      Left Ear: External ear normal.      Nose: Nose normal.      Mouth/Throat:      Mouth: Mucous membranes are moist.   Eyes:      Extraocular Movements: Extraocular movements intact.      Pupils: Pupils are equal, round, and reactive to light.   Cardiovascular:      Rate and Rhythm: Normal rate and regular rhythm.      Pulses: Normal pulses.      Heart sounds: Normal heart sounds.   Pulmonary:      Effort: Pulmonary effort is normal.      Breath sounds: Normal breath sounds.   Abdominal:      General: Abdomen is flat. Bowel sounds are normal.      Palpations: Abdomen is soft.   Musculoskeletal:      Cervical back: No rigidity or tenderness.      Right lower leg: No edema.      Left lower leg: No edema. "   Skin:     General: Skin is warm.      Capillary Refill: Capillary refill takes less than 2 seconds.   Neurological:      General: No focal deficit present.      Mental Status: She is alert and oriented to person, place, and time.       CURRENT MEDICATIONS     Current Outpatient Medications:     albuterol (Proventil HFA) 90 mcg/act inhaler, Inhale 2 puffs every 6 (six) hours as needed for wheezing, Disp: 6.7 g, Rfl: 0    amLODIPine (NORVASC) 5 mg tablet, Take 1 tablet (5 mg total) by mouth daily, Disp: 90 tablet, Rfl: 1    Biotin w/ Vitamins C & E (HAIR/SKIN/NAILS PO), Take by mouth daily at bedtime, Disp: , Rfl:     Calcium Carbonate-Vitamin D 600-200 MG-UNIT TABS, Take 1 tablet by mouth daily, Disp: , Rfl:     clindamycin (CLEOCIN T) 1 % lotion, Apply 1 application topically 2 (two) times a day as needed (ezema), Disp: 60 mL, Rfl: 2    DAILY MULTIPLE VITAMINS PO, Take 1 tablet by mouth daily , Disp: , Rfl:     dextromethorphan-guaiFENesin (ROBITUSSIN DM)  mg/5 mL syrup, Take 5 mL by mouth 3 (three) times a day as needed for cough for up to 10 days, Disp: 118 mL, Rfl: 0    ergocalciferol (VITAMIN D2) 50,000 units, Take 1 capsule (50,000 Units total) by mouth every 14 (fourteen) days, Disp: 6 capsule, Rfl: 1    fluticasone (FLONASE) 50 mcg/act nasal spray, 2 sprays into each nostril daily as needed, Disp: , Rfl:     hydrochlorothiazide (HYDRODIURIL) 25 mg tablet, Take 1 tablet (25 mg total) by mouth daily, Disp: 90 tablet, Rfl: 1    IRON, FERROUS GLUCONATE, PO, Take 1 tablet by mouth every other day, Disp: , Rfl:     levonorgestrel (KYLEENA) 19.5 MG intrauterine device, 1 Intra Uterine Device by Intrauterine route once, Disp: , Rfl:     potassium chloride (K-DUR,KLOR-CON) 20 mEq tablet, Take 2 tablets (40 mEq total) by mouth daily, Disp: 180 tablet, Rfl: 1    Probiotic Product (DIGESTIVE ADVANTAGE PO), Take 1 capsule by mouth every other day, Disp: , Rfl:     triamcinolone (KENALOG) 0.1 % ointment, Apply  topically 2 (two) times a day, Disp: 30 g, Rfl: 3    valACYclovir (VALTREX) 1,000 mg tablet, , Disp: , Rfl:     PAST MEDICAL & SURGICAL HISTORY     Past Medical History:   Diagnosis Date    Acne     resolved-12/3/2015    Eczema     last assessed-12/3/2015    Gastroesophageal reflux disease without esophagitis 06/28/2019    Hypertension     last assessed-12/11/2017    Iron deficiency anemia 05/21/2019    Obesity     Pneumonia 11/04/2019    Shingles 08/14/2023    Subluxation of left patella     last assessed-3/31/2015    URI, acute 05/14/2018     Past Surgical History:   Procedure Laterality Date    TONSILLECTOMY      WISDOM TOOTH EXTRACTION  03/2020     SOCIAL & FAMILY HISTORY     Social History     Socioeconomic History    Marital status: Single     Spouse name: Not on file    Number of children: Not on file    Years of education: Not on file    Highest education level: Not on file   Occupational History    Not on file   Tobacco Use    Smoking status: Never    Smokeless tobacco: Never   Vaping Use    Vaping status: Never Used   Substance and Sexual Activity    Alcohol use: Yes     Alcohol/week: 1.0 standard drink of alcohol     Types: 1 Standard drinks or equivalent per week     Comment: Socially    Drug use: No    Sexual activity: Yes     Partners: Male     Birth control/protection: I.U.D.   Other Topics Concern    Not on file   Social History Narrative    Not on file     Social Determinants of Health     Financial Resource Strain: Not on file   Food Insecurity: Not on file   Transportation Needs: Not on file   Physical Activity: Not on file   Stress: Not on file   Social Connections: Not on file   Intimate Partner Violence: Not on file   Housing Stability: Not on file     Social History     Substance and Sexual Activity   Alcohol Use Yes    Alcohol/week: 1.0 standard drink of alcohol    Types: 1 Standard drinks or equivalent per week    Comment: Socially       Social History     Substance and Sexual Activity    Drug Use No     Social History     Tobacco Use   Smoking Status Never   Smokeless Tobacco Never     Family History   Problem Relation Age of Onset    Allergic rhinitis Mother     Hypertension Mother     Diabetes Mother     Thyroid disease Maternal Grandmother     Dementia Maternal Grandmother     COPD Maternal Grandfather     Asthma Maternal Grandfather     Atrial fibrillation Maternal Grandfather     Cancer Other     Diabetes Other     Cancer Maternal Uncle         Great Uncle - Grandmothers Brother             ==  Jose Choi MD PGY2  [facility] Internal Medicine Residency

## 2023-12-20 NOTE — PATIENT INSTRUCTIONS
I have prescribed Robitussin DM cough syrup - please take up to 3x daily as needed for up to 10 days.  Follow up as scheduled in 5 months.

## 2023-12-21 DIAGNOSIS — J10.1 INFLUENZA A: Primary | ICD-10-CM

## 2023-12-21 LAB
FLUAV RNA RESP QL NAA+PROBE: POSITIVE
FLUBV RNA RESP QL NAA+PROBE: NEGATIVE
SARS-COV-2 RNA RESP QL NAA+PROBE: NEGATIVE

## 2023-12-21 RX ORDER — OSELTAMIVIR PHOSPHATE 75 MG/1
75 CAPSULE ORAL EVERY 12 HOURS SCHEDULED
Qty: 10 CAPSULE | Refills: 0 | Status: SHIPPED | OUTPATIENT
Start: 2023-12-21 | End: 2023-12-26

## 2023-12-21 NOTE — TELEPHONE ENCOUNTER
Patient is feeling worse this morning. She was taking Coricidin multi-symptom but it's not helping. Is there any way to prescribe something other than cough syrup that will help her throat pain and worsening head & nasal congestion?

## 2023-12-23 ENCOUNTER — OFFICE VISIT (OUTPATIENT)
Dept: URGENT CARE | Facility: CLINIC | Age: 23
End: 2023-12-23
Payer: COMMERCIAL

## 2023-12-23 VITALS
HEART RATE: 100 BPM | TEMPERATURE: 98.7 F | RESPIRATION RATE: 16 BRPM | BODY MASS INDEX: 50.02 KG/M2 | WEIGHT: 293 LBS | SYSTOLIC BLOOD PRESSURE: 160 MMHG | HEIGHT: 64 IN | OXYGEN SATURATION: 99 % | DIASTOLIC BLOOD PRESSURE: 97 MMHG

## 2023-12-23 DIAGNOSIS — J11.1 FLU: ICD-10-CM

## 2023-12-23 DIAGNOSIS — H66.91 RIGHT OTITIS MEDIA, UNSPECIFIED OTITIS MEDIA TYPE: Primary | ICD-10-CM

## 2023-12-23 PROCEDURE — 99213 OFFICE O/P EST LOW 20 MIN: CPT | Performed by: FAMILY MEDICINE

## 2023-12-23 RX ORDER — AMOXICILLIN 500 MG/1
500 TABLET, FILM COATED ORAL 3 TIMES DAILY
Qty: 21 TABLET | Refills: 0 | Status: SHIPPED | OUTPATIENT
Start: 2023-12-23 | End: 2023-12-30

## 2023-12-23 NOTE — PROGRESS NOTES
[facility]      NAME: Marlyn Chapin is a 23 y.o. female  : 2000    MRN: [id number]  DATE: 2023  TIME: 8:41 AM    Assessment and Plan   Right otitis media, unspecified otitis media type [H66.91]  1. Right otitis media, unspecified otitis media type  amoxicillin (AMOXIL) 500 MG tablet      2. Flu            Patient Instructions   I have prescribed an antibiotic for the infection.  Please take the antibiotic as prescribed and finish the entire prescription.  I recommend that the patient takes an over the counter probiotic or eats yogurt with live cultures in it (activia) to keep good bacteria in the gut and help prevent diarrhea.  Wash hands frequently to prevent the spread of infection.  Can use over the counter cough and cold medications to help with symptoms.  Ibuprofen and/or tylenol as needed for pain or fever.  If not improving over the next 3-5 days, follow up with PCP.    To present to the ER if symptoms worsen.  Chief Complaint     Chief Complaint   Patient presents with    Influenza     Diagnosed with  flu A  on Monday still not feeling any better         History of Present Illness   Marlyn Chapin presents to the clinic c/o    Generalized Body Aches  Episode onset: . The problem occurs constantly. The problem is unchanged. The pain is moderate. Associated symptoms include congestion, ear pain, a sore throat, fatigue, a fever, coughing and muscle aches. Pertinent negatives include no ear discharge, eye discharge, eye itching, eye pain, eye redness, headaches, photophobia, chest pain, shortness of breath, wheezing, abdominal pain, diarrhea, nausea, vomiting or rash. Treatments tried: tamiflu day 3, mucinex. The treatment provided no relief.       Review of Systems   Review of Systems   Constitutional:  Positive for fatigue and fever. Negative for chills and diaphoresis.   HENT:  Positive for congestion, ear pain and sore throat. Negative for ear discharge and facial  swelling.    Eyes:  Negative for photophobia, pain, discharge, redness, itching and visual disturbance.   Respiratory:  Positive for cough. Negative for apnea, chest tightness, shortness of breath and wheezing.    Cardiovascular:  Negative for chest pain and palpitations.   Gastrointestinal:  Negative for abdominal pain, diarrhea, nausea and vomiting.   Skin:  Negative for color change, rash and wound.   Neurological:  Negative for dizziness and headaches.   Hematological:  Negative for adenopathy.         Current Medications     Long-Term Medications   Medication Sig Dispense Refill    amLODIPine (NORVASC) 5 mg tablet Take 1 tablet (5 mg total) by mouth daily 90 tablet 1    Calcium Carbonate-Vitamin D 600-200 MG-UNIT TABS Take 1 tablet by mouth daily      clindamycin (CLEOCIN T) 1 % lotion Apply 1 application topically 2 (two) times a day as needed (ezema) 60 mL 2    ergocalciferol (VITAMIN D2) 50,000 units Take 1 capsule (50,000 Units total) by mouth every 14 (fourteen) days 6 capsule 1    fluticasone (FLONASE) 50 mcg/act nasal spray 2 sprays into each nostril daily as needed      hydrochlorothiazide (HYDRODIURIL) 25 mg tablet Take 1 tablet (25 mg total) by mouth daily 90 tablet 1    potassium chloride (K-DUR,KLOR-CON) 20 mEq tablet Take 2 tablets (40 mEq total) by mouth daily 180 tablet 1    triamcinolone (KENALOG) 0.1 % ointment Apply topically 2 (two) times a day 30 g 3    valACYclovir (VALTREX) 1,000 mg tablet          Current Allergies     Allergies as of 12/23/2023 - Reviewed 12/23/2023   Allergen Reaction Noted    Cefdinir Hives 02/18/2015    Nuts - food allergy Itching, Other (See Comments), and Swelling 08/01/2023            The following portions of the patient's history were reviewed and updated as appropriate: allergies, current medications, past family history, past medical history, past social history, past surgical history and problem list.  Past Medical History:   Diagnosis Date    Acne      "resolved-12/3/2015    Eczema     last assessed-12/3/2015    Gastroesophageal reflux disease without esophagitis 06/28/2019    Hypertension     last assessed-12/11/2017    Iron deficiency anemia 05/21/2019    Obesity     Pneumonia 11/04/2019    Shingles 08/14/2023    Subluxation of left patella     last assessed-3/31/2015    URI, acute 05/14/2018     Past Surgical History:   Procedure Laterality Date    TONSILLECTOMY      WISDOM TOOTH EXTRACTION  03/2020     Social History     Socioeconomic History    Marital status: Single     Spouse name: Not on file    Number of children: Not on file    Years of education: Not on file    Highest education level: Not on file   Occupational History    Not on file   Tobacco Use    Smoking status: Never    Smokeless tobacco: Never   Vaping Use    Vaping status: Never Used   Substance and Sexual Activity    Alcohol use: Yes     Alcohol/week: 1.0 standard drink of alcohol     Types: 1 Standard drinks or equivalent per week     Comment: Socially    Drug use: No    Sexual activity: Yes     Partners: Male     Birth control/protection: I.U.D.   Other Topics Concern    Not on file   Social History Narrative    Not on file     Social Determinants of Health     Financial Resource Strain: Not on file   Food Insecurity: Not on file   Transportation Needs: Not on file   Physical Activity: Not on file   Stress: Not on file   Social Connections: Not on file   Intimate Partner Violence: Not on file   Housing Stability: Not on file       Objective   /97   Pulse 100   Temp 98.7 °F (37.1 °C)   Resp 16   Ht 5' 4\" (1.626 m)   Wt (!) 142 kg (314 lb)   SpO2 99%   BMI 53.90 kg/m²      Physical Exam     Physical Exam  Vitals and nursing note reviewed.   Constitutional:       General: She is not in acute distress.     Appearance: She is well-developed. She is not diaphoretic.   HENT:      Head: Normocephalic and atraumatic.      Right Ear: External ear normal. Tympanic membrane is erythematous " and bulging.      Left Ear: Tympanic membrane and external ear normal.      Nose: Nose normal.      Mouth/Throat:      Mouth: Mucous membranes are moist.      Pharynx: No oropharyngeal exudate or posterior oropharyngeal erythema.   Eyes:      General: No scleral icterus.        Right eye: No discharge.         Left eye: No discharge.      Conjunctiva/sclera: Conjunctivae normal.   Cardiovascular:      Rate and Rhythm: Normal rate and regular rhythm.      Heart sounds: Normal heart sounds. No murmur heard.     No friction rub. No gallop.   Pulmonary:      Effort: Pulmonary effort is normal. No respiratory distress.      Breath sounds: Normal breath sounds. No decreased breath sounds, wheezing, rhonchi or rales.   Skin:     General: Skin is warm and dry.      Coloration: Skin is not pale.      Findings: No erythema or rash.   Neurological:      Mental Status: She is alert and oriented to person, place, and time.   Psychiatric:         Behavior: Behavior normal.         Thought Content: Thought content normal.         Judgment: Judgment normal.         Joyce Cordon PA-C

## 2024-02-12 DIAGNOSIS — E87.6 HYPOKALEMIA: ICD-10-CM

## 2024-02-13 RX ORDER — POTASSIUM CHLORIDE 20 MEQ/1
40 TABLET, EXTENDED RELEASE ORAL DAILY
Qty: 180 TABLET | Refills: 1 | Status: SHIPPED | OUTPATIENT
Start: 2024-02-13

## 2024-03-12 ENCOUNTER — OFFICE VISIT (OUTPATIENT)
Dept: INTERNAL MEDICINE CLINIC | Facility: OTHER | Age: 24
End: 2024-03-12
Payer: COMMERCIAL

## 2024-03-12 VITALS
SYSTOLIC BLOOD PRESSURE: 142 MMHG | TEMPERATURE: 99 F | HEART RATE: 86 BPM | WEIGHT: 293 LBS | HEIGHT: 64 IN | OXYGEN SATURATION: 99 % | DIASTOLIC BLOOD PRESSURE: 70 MMHG | BODY MASS INDEX: 50.02 KG/M2

## 2024-03-12 DIAGNOSIS — L03.112 CELLULITIS OF LEFT AXILLA: ICD-10-CM

## 2024-03-12 DIAGNOSIS — T78.1XXA POLLEN-FOOD ALLERGY, INITIAL ENCOUNTER: ICD-10-CM

## 2024-03-12 PROCEDURE — 99213 OFFICE O/P EST LOW 20 MIN: CPT | Performed by: INTERNAL MEDICINE

## 2024-03-12 RX ORDER — SULFAMETHOXAZOLE AND TRIMETHOPRIM 800; 160 MG/1; MG/1
1 TABLET ORAL 2 TIMES DAILY
Qty: 14 TABLET | Refills: 0 | Status: SHIPPED | OUTPATIENT
Start: 2024-03-12 | End: 2024-03-19

## 2024-03-12 NOTE — ASSESSMENT & PLAN NOTE
Will treat with Bactrim DS 1 p.o. twice daily and Bactroban ointment topically for 1 week.  If no improvement advised patient to follow-up with her dermatologist.

## 2024-03-12 NOTE — ASSESSMENT & PLAN NOTE
Referral to weight management discussed with patient but she is not interested at present.  Advised for low caloric diet and exercise.  Follow-up with PCP and can discuss referral if you agree.

## 2024-03-12 NOTE — ASSESSMENT & PLAN NOTE
As per patient about a month ago she ate kind chocolate bar with nuts and she noticed some discomfort and swelling of the tongue which lasted for about 8 hours and relieved by taking a Benadryl.    Will request food and tree nut allergy panel.  Advised her to avoid those ingredients.

## 2024-03-12 NOTE — PROGRESS NOTES
Assessment/Plan:    BMI 50.0-59.9, adult (HCC)  Referral to weight management discussed with patient but she is not interested at present.  Advised for low caloric diet and exercise.  Follow-up with PCP and can discuss referral if you agree.    Cellulitis of left axilla  Will treat with Bactrim DS 1 p.o. twice daily and Bactroban ointment topically for 1 week.  If no improvement advised patient to follow-up with her dermatologist.    Pollen-food allergy  As per patient about a month ago she ate kind chocolate bar with nuts and she noticed some discomfort and swelling of the tongue which lasted for about 8 hours and relieved by taking a Benadryl.    Will request food and tree nut allergy panel.  Advised her to avoid those ingredients.       Diagnoses and all orders for this visit:    BMI 50.0-59.9, adult (HCC)    Cellulitis of left axilla  -     mupirocin (BACTROBAN) 2 % ointment; Apply topically 3 (three) times a day  -     sulfamethoxazole-trimethoprim (BACTRIM DS) 800-160 mg per tablet; Take 1 tablet by mouth 2 (two) times a day for 7 days    Pollen-food allergy, initial encounter  -     FOOD AND TREE NUT ALLERGY PANEL; Future               Subjective:          Patient ID: Marlyn Chapin is a 23 y.o. female.    Patient came to office with a complaint of redness and drainage in left axilla.  About a month ago she was seen by dermatologist and was told probably just in and grow here.  She also noticed some drainage.        The following portions of the patient's history were reviewed and updated as appropriate: allergies, current medications, past family history, past medical history, past social history, past surgical history, and problem list.    Review of Systems   Constitutional:  Negative for fatigue and fever.   HENT:  Negative for congestion, ear discharge, ear pain, postnasal drip, sinus pressure, sore throat, tinnitus and trouble swallowing.    Eyes:  Negative for discharge, itching and visual  disturbance.   Respiratory:  Negative for cough and shortness of breath.    Cardiovascular:  Negative for chest pain and palpitations.   Gastrointestinal:  Negative for abdominal pain, diarrhea, nausea and vomiting.   Endocrine: Negative for cold intolerance and polyuria.   Genitourinary:  Negative for difficulty urinating, dysuria and urgency.   Musculoskeletal:  Negative for arthralgias and neck pain.   Skin:  Positive for color change and wound.        Left axilla   Allergic/Immunologic: Negative for environmental allergies.   Neurological:  Negative for dizziness, weakness and headaches.   Psychiatric/Behavioral:  Negative for agitation. The patient is not nervous/anxious.          Past Medical History:   Diagnosis Date    Acne     resolved-12/3/2015    Eczema     last assessed-12/3/2015    Gastroesophageal reflux disease without esophagitis 06/28/2019    Hypertension     last assessed-12/11/2017    Iron deficiency anemia 05/21/2019    Obesity     Pneumonia 11/04/2019    Shingles 08/14/2023    Subluxation of left patella     last assessed-3/31/2015    URI, acute 05/14/2018         Current Outpatient Medications:     amLODIPine (NORVASC) 5 mg tablet, Take 1 tablet (5 mg total) by mouth daily, Disp: 90 tablet, Rfl: 1    clindamycin (CLEOCIN T) 1 % lotion, Apply 1 application topically 2 (two) times a day as needed (ezema), Disp: 60 mL, Rfl: 2    ergocalciferol (VITAMIN D2) 50,000 units, Take 1 capsule (50,000 Units total) by mouth every 14 (fourteen) days, Disp: 6 capsule, Rfl: 1    fluticasone (FLONASE) 50 mcg/act nasal spray, 2 sprays into each nostril daily as needed, Disp: , Rfl:     hydrochlorothiazide (HYDRODIURIL) 25 mg tablet, Take 1 tablet (25 mg total) by mouth daily, Disp: 90 tablet, Rfl: 1    IRON, FERROUS GLUCONATE, PO, Take 1 tablet by mouth every other day, Disp: , Rfl:     levonorgestrel (KYLEENA) 19.5 MG intrauterine device, 1 Intra Uterine Device by Intrauterine route once, Disp: , Rfl:      mupirocin (BACTROBAN) 2 % ointment, Apply topically 3 (three) times a day, Disp: 30 g, Rfl: 1    potassium chloride (Klor-Con M20) 20 mEq tablet, Take 2 tablets (40 mEq total) by mouth daily, Disp: 180 tablet, Rfl: 1    sulfamethoxazole-trimethoprim (BACTRIM DS) 800-160 mg per tablet, Take 1 tablet by mouth 2 (two) times a day for 7 days, Disp: 14 tablet, Rfl: 0    albuterol (Proventil HFA) 90 mcg/act inhaler, Inhale 2 puffs every 6 (six) hours as needed for wheezing (Patient not taking: Reported on 3/12/2024), Disp: 6.7 g, Rfl: 0    Biotin w/ Vitamins C & E (HAIR/SKIN/NAILS PO), Take by mouth daily at bedtime (Patient not taking: Reported on 3/12/2024), Disp: , Rfl:     Calcium Carbonate-Vitamin D 600-200 MG-UNIT TABS, Take 1 tablet by mouth daily (Patient not taking: Reported on 3/12/2024), Disp: , Rfl:     DAILY MULTIPLE VITAMINS PO, Take 1 tablet by mouth daily  (Patient not taking: Reported on 3/12/2024), Disp: , Rfl:     Probiotic Product (DIGESTIVE ADVANTAGE PO), Take 1 capsule by mouth every other day (Patient not taking: Reported on 3/12/2024), Disp: , Rfl:     triamcinolone (KENALOG) 0.1 % ointment, Apply topically 2 (two) times a day (Patient not taking: Reported on 3/12/2024), Disp: 30 g, Rfl: 3    valACYclovir (VALTREX) 1,000 mg tablet, , Disp: , Rfl:     Allergies   Allergen Reactions    Cefdinir Hives    Nuts - Food Allergy Itching, Other (See Comments) and Swelling       Social History   Past Surgical History:   Procedure Laterality Date    TONSILLECTOMY      WISDOM TOOTH EXTRACTION  03/2020     Family History   Problem Relation Age of Onset    Allergic rhinitis Mother     Hypertension Mother     Diabetes Mother     Thyroid disease Maternal Grandmother     Dementia Maternal Grandmother     COPD Maternal Grandfather     Asthma Maternal Grandfather     Atrial fibrillation Maternal Grandfather     Cancer Other     Diabetes Other     Cancer Maternal Uncle         Great Uncle - Grandmothers Brother  "      Objective:  /70 (BP Location: Left arm, Patient Position: Sitting, Cuff Size: Large)   Pulse 86   Temp 99 °F (37.2 °C) (Tympanic)   Ht 5' 4\" (1.626 m)   Wt (!) 144 kg (316 lb 6.4 oz)   SpO2 99% Comment: cain  BMI 54.31 kg/m²   Body mass index is 54.31 kg/m².     Physical Exam  Constitutional:       General: She is not in acute distress.     Appearance: She is well-developed. She is obese.   HENT:      Head: Normocephalic.      Right Ear: External ear normal. There is no impacted cerumen.      Left Ear: External ear normal. There is no impacted cerumen.      Nose: No rhinorrhea.      Mouth/Throat:      Pharynx: No posterior oropharyngeal erythema.   Eyes:      General: No scleral icterus.     Pupils: Pupils are equal, round, and reactive to light.   Neck:      Thyroid: No thyromegaly.      Trachea: No tracheal deviation.   Cardiovascular:      Rate and Rhythm: Normal rate and regular rhythm.      Heart sounds: Normal heart sounds. No murmur heard.  Pulmonary:      Effort: Pulmonary effort is normal. No respiratory distress.      Breath sounds: Normal breath sounds.   Chest:      Chest wall: No tenderness.   Abdominal:      General: Bowel sounds are normal.      Palpations: Abdomen is soft. There is no mass.      Tenderness: There is no abdominal tenderness.   Musculoskeletal:         General: Normal range of motion.      Cervical back: Normal range of motion and neck supple. No rigidity.      Right lower leg: No edema.      Left lower leg: No edema.   Lymphadenopathy:      Cervical: No cervical adenopathy.   Skin:     General: Skin is warm.      Findings: Erythema and lesion present. No rash.      Comments: Examination of left axilla reveals 1 area of half centimeter induration with slight exudate and surrounding erythema noted.  Multiple other small area of folliculitis noted.   Neurological:      Mental Status: She is alert and oriented to person, place, and time.      Cranial Nerves: No cranial " nerve deficit.   Psychiatric:         Mood and Affect: Mood normal.         Behavior: Behavior normal.

## 2024-03-23 DIAGNOSIS — I10 ESSENTIAL HYPERTENSION: ICD-10-CM

## 2024-03-25 RX ORDER — HYDROCHLOROTHIAZIDE 25 MG/1
25 TABLET ORAL DAILY
Qty: 90 TABLET | Refills: 1 | Status: SHIPPED | OUTPATIENT
Start: 2024-03-25

## 2024-03-25 RX ORDER — AMLODIPINE BESYLATE 5 MG/1
5 TABLET ORAL DAILY
Qty: 90 TABLET | Refills: 1 | Status: SHIPPED | OUTPATIENT
Start: 2024-03-25

## 2024-04-05 ENCOUNTER — ANNUAL EXAM (OUTPATIENT)
Dept: OBGYN CLINIC | Facility: MEDICAL CENTER | Age: 24
End: 2024-04-05
Payer: COMMERCIAL

## 2024-04-05 VITALS
DIASTOLIC BLOOD PRESSURE: 88 MMHG | SYSTOLIC BLOOD PRESSURE: 128 MMHG | HEIGHT: 64 IN | BODY MASS INDEX: 50.02 KG/M2 | WEIGHT: 293 LBS

## 2024-04-05 DIAGNOSIS — Z30.431 IUD CHECK UP: ICD-10-CM

## 2024-04-05 DIAGNOSIS — Z01.419 ENCNTR FOR GYN EXAM (GENERAL) (ROUTINE) W/O ABN FINDINGS: Primary | ICD-10-CM

## 2024-04-05 DIAGNOSIS — Z11.3 SCREENING FOR STD (SEXUALLY TRANSMITTED DISEASE): ICD-10-CM

## 2024-04-05 PROCEDURE — S0612 ANNUAL GYNECOLOGICAL EXAMINA: HCPCS | Performed by: NURSE PRACTITIONER

## 2024-04-05 PROCEDURE — 87491 CHLMYD TRACH DNA AMP PROBE: CPT | Performed by: NURSE PRACTITIONER

## 2024-04-05 PROCEDURE — 87591 N.GONORRHOEAE DNA AMP PROB: CPT | Performed by: NURSE PRACTITIONER

## 2024-04-05 NOTE — PATIENT INSTRUCTIONS
Pap every 3 years if normal.Due 2025  STI testing as indicated.GC/CT collected.   Exercise most days of week-minimum of 150-300 minutes per week.   Obtain appropriate diet and hydration.   Calcium 1000 mg (in divided doses-max 600 mg at one time) + 600 vit D daily.  Condom use when sexually active for sexually transmitted infection prevention.   HPV 9 vaccine series is completed  Annual mammogram starting at age 40, monthly breast self exam recommended.   Check IUD string monthly after menses.   Return to office for Kyleena IUD removal and replacement prior to expiration.   Call your insurance company to verify coverage prior to completing any ordered tests.   Return to office in one year or sooner, if needed.

## 2024-04-05 NOTE — PROGRESS NOTES
Assessment/Plan:  Pap every 3 years if normal.Due   STI testing as indicated.GC/CT collected.   Exercise most days of week-minimum of 150-300 minutes per week.   Obtain appropriate diet and hydration.   Calcium 1000 mg (in divided doses-max 600 mg at one time) + 600 vit D daily.  Condom use when sexually active for sexually transmitted infection prevention.   HPV 9 vaccine series is completed  Annual mammogram starting at age 40, monthly breast self exam recommended.   Check IUD string monthly after menses.   Return to office for Kyleena IUD removal and replacement prior to expiration.   Call your insurance company to verify coverage prior to completing any ordered tests.   Return to office in one year or sooner, if needed.        1. Encntr for gyn exam (general) (routine) w/o abn findings    2. Screening for STD (sexually transmitted disease)  -     Chlamydia/GC amplified DNA by PCR    3. IUD check up    4. BMI 50.0-59.9, adult ([facility])               Subjective:      Patient ID: Marlyn Chapin is a 24 y.o. female.    HPI    Marlyn Chapin is a 24 y.o.   female who is here today for her annual visit. No gynecologic health concerns.   Normal BP today and monitors her BP daily.   Irregular spotting with kyleena IUD. It expires .   Exercise- most days of the week.    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      Marlyn Chapin is sexually active with male partner/ live in boyfriend of 1.5 years. Monogamous and feels safe in this relationship. Denies vaginal pain,bleeding or dryness.    She uses Kyleena IUD  for contraception since 19.    She is interested in STD screening today. Used clindamycin lotion to right vulvar lesions for 2 weeks after prescribed and symptoms resolved.   She denies vaginal discharge, itching or pelvic pain.   She has no  urinary concerns, does not have incontinence.  No bowel concerns.  No breast concerns.  "    Last pap: 03/09/2022 normal   DEXA scan: Not on file  Mammogram: Not on file   Colonoscopy: Not on file  HPV vaccine series: completed     Family history of cancer:   Cancer-related family history includes Cancer in her maternal uncle and other. There is no history of Breast cancer or Colon cancer.      The following portions of the patient's history were reviewed and updated as appropriate: allergies, current medications, past family history, past medical history, past social history, past surgical history, and problem list.    Review of Systems   Constitutional: Negative.  Negative for activity change, appetite change, chills, diaphoresis, fatigue, fever and unexpected weight change.   HENT:  Negative for congestion, dental problem, sneezing, sore throat and trouble swallowing.    Eyes:  Negative for visual disturbance.   Respiratory:  Negative for chest tightness and shortness of breath.    Cardiovascular:  Negative for chest pain and leg swelling.   Gastrointestinal:  Negative for abdominal pain, constipation, diarrhea, nausea and vomiting.   Genitourinary:  Negative for difficulty urinating, dyspareunia, dysuria, frequency, hematuria, menstrual problem, pelvic pain, urgency, vaginal bleeding, vaginal discharge and vaginal pain.   Musculoskeletal:  Negative for back pain and neck pain.   Skin: Negative.    Allergic/Immunologic: Negative.    Neurological:  Negative for weakness and headaches.   Hematological:  Negative for adenopathy.   Psychiatric/Behavioral: Negative.           Objective:      /88 (BP Location: Left arm, Patient Position: Sitting, Cuff Size: Large)   Ht 5' 3.5\" (1.613 m)   Wt (!) 142 kg (314 lb)   BMI 54.75 kg/m²          Physical Exam  Vitals and nursing note reviewed.   Constitutional:       Appearance: Normal appearance. She is well-developed.      Comments: BMI 54     HENT:      Head: Normocephalic and atraumatic.   Eyes:      General:         Right eye: No discharge.         " Left eye: No discharge.   Neck:      Thyroid: No thyromegaly.      Trachea: Trachea normal.   Cardiovascular:      Rate and Rhythm: Normal rate and regular rhythm.      Heart sounds: Normal heart sounds.   Pulmonary:      Effort: Pulmonary effort is normal.      Breath sounds: Normal breath sounds.   Chest:   Breasts:     Breasts are symmetrical.      Right: Normal. No inverted nipple, mass, nipple discharge, skin change or tenderness.      Left: Normal. No inverted nipple, mass, nipple discharge, skin change or tenderness.   Abdominal:      Palpations: Abdomen is soft.   Genitourinary:     General: Normal vulva.      Exam position: Lithotomy position.      Labia:         Right: No rash, tenderness, lesion or injury.         Left: No rash, tenderness, lesion or injury.       Urethra: No prolapse, urethral pain, urethral swelling or urethral lesion.      Vagina: Normal. No signs of injury and foreign body. No vaginal discharge, erythema, tenderness or bleeding.      Cervix: Normal.      Uterus: Normal.       Adnexa:         Right: No mass, tenderness or fullness.          Left: No mass, tenderness or fullness.        Rectum: No external hemorrhoid.      Comments: IUD string noted at os  Musculoskeletal:         General: Normal range of motion.      Cervical back: Normal range of motion and neck supple.   Lymphadenopathy:      Head:      Right side of head: No submental, submandibular or tonsillar adenopathy.      Left side of head: No submental, submandibular or tonsillar adenopathy.      Cervical: No cervical adenopathy.      Upper Body:      Right upper body: No supraclavicular or axillary adenopathy.      Left upper body: No supraclavicular or axillary adenopathy.      Lower Body: No right inguinal adenopathy. No left inguinal adenopathy.   Skin:     General: Skin is warm and dry.   Neurological:      Mental Status: She is alert and oriented to person, place, and time.   Psychiatric:         Mood and Affect: Mood  normal.         Behavior: Behavior normal.

## 2024-04-06 LAB
C TRACH DNA SPEC QL NAA+PROBE: NEGATIVE
N GONORRHOEA DNA SPEC QL NAA+PROBE: NEGATIVE

## 2024-05-13 ENCOUNTER — RA CDI HCC (OUTPATIENT)
Dept: OTHER | Facility: HOSPITAL | Age: 24
End: 2024-05-13

## 2024-05-17 ENCOUNTER — OFFICE VISIT (OUTPATIENT)
Dept: INTERNAL MEDICINE CLINIC | Facility: OTHER | Age: 24
End: 2024-05-17
Payer: COMMERCIAL

## 2024-05-17 ENCOUNTER — APPOINTMENT (OUTPATIENT)
Dept: LAB | Facility: CLINIC | Age: 24
End: 2024-05-17
Payer: COMMERCIAL

## 2024-05-17 VITALS
HEIGHT: 64 IN | WEIGHT: 293 LBS | OXYGEN SATURATION: 98 % | HEART RATE: 94 BPM | DIASTOLIC BLOOD PRESSURE: 84 MMHG | TEMPERATURE: 98 F | SYSTOLIC BLOOD PRESSURE: 134 MMHG | BODY MASS INDEX: 50.02 KG/M2

## 2024-05-17 DIAGNOSIS — E55.9 VITAMIN D DEFICIENCY: ICD-10-CM

## 2024-05-17 DIAGNOSIS — I10 PRIMARY HYPERTENSION: ICD-10-CM

## 2024-05-17 DIAGNOSIS — T78.1XXA POLLEN-FOOD ALLERGY, INITIAL ENCOUNTER: ICD-10-CM

## 2024-05-17 DIAGNOSIS — L72.3 SEBACEOUS CYST OF RIGHT AXILLA: ICD-10-CM

## 2024-05-17 DIAGNOSIS — Z13.220 SCREENING CHOLESTEROL LEVEL: ICD-10-CM

## 2024-05-17 DIAGNOSIS — E03.8 SUBCLINICAL HYPOTHYROIDISM: ICD-10-CM

## 2024-05-17 DIAGNOSIS — I10 PRIMARY HYPERTENSION: Primary | ICD-10-CM

## 2024-05-17 PROBLEM — S99.912D ANKLE INJURY, LEFT, SUBSEQUENT ENCOUNTER: Status: RESOLVED | Noted: 2023-09-08 | Resolved: 2024-05-17

## 2024-05-17 PROBLEM — L03.112 CELLULITIS OF LEFT AXILLA: Status: RESOLVED | Noted: 2024-03-12 | Resolved: 2024-05-17

## 2024-05-17 LAB
ANION GAP SERPL CALCULATED.3IONS-SCNC: 10 MMOL/L (ref 4–13)
BUN SERPL-MCNC: 15 MG/DL (ref 5–25)
CALCIUM SERPL-MCNC: 9.4 MG/DL (ref 8.4–10.2)
CHLORIDE SERPL-SCNC: 103 MMOL/L (ref 96–108)
CO2 SERPL-SCNC: 26 MMOL/L (ref 21–32)
CREAT SERPL-MCNC: 0.49 MG/DL (ref 0.6–1.3)
GFR SERPL CREATININE-BSD FRML MDRD: 136 ML/MIN/1.73SQ M
GLUCOSE P FAST SERPL-MCNC: 60 MG/DL (ref 65–99)
POTASSIUM SERPL-SCNC: 3.5 MMOL/L (ref 3.5–5.3)
SODIUM SERPL-SCNC: 139 MMOL/L (ref 135–147)
T4 FREE SERPL-MCNC: 0.9 NG/DL (ref 0.61–1.12)
TSH SERPL DL<=0.05 MIU/L-ACNC: 2.52 UIU/ML (ref 0.45–4.5)

## 2024-05-17 PROCEDURE — 84439 ASSAY OF FREE THYROXINE: CPT

## 2024-05-17 PROCEDURE — 80048 BASIC METABOLIC PNL TOTAL CA: CPT

## 2024-05-17 PROCEDURE — 36415 COLL VENOUS BLD VENIPUNCTURE: CPT

## 2024-05-17 PROCEDURE — 82785 ASSAY OF IGE: CPT

## 2024-05-17 PROCEDURE — 84443 ASSAY THYROID STIM HORMONE: CPT

## 2024-05-17 PROCEDURE — 86003 ALLG SPEC IGE CRUDE XTRC EA: CPT

## 2024-05-17 PROCEDURE — 99214 OFFICE O/P EST MOD 30 MIN: CPT | Performed by: FAMILY MEDICINE

## 2024-05-17 RX ORDER — SEMAGLUTIDE 0.25 MG/.5ML
INJECTION, SOLUTION SUBCUTANEOUS
Qty: 2 ML | Refills: 3 | Status: SHIPPED | OUTPATIENT
Start: 2024-05-17

## 2024-05-17 RX ORDER — SULFAMETHOXAZOLE AND TRIMETHOPRIM 800; 160 MG/1; MG/1
1 TABLET ORAL 2 TIMES DAILY
Qty: 14 TABLET | Refills: 0 | Status: SHIPPED | OUTPATIENT
Start: 2024-05-17 | End: 2024-05-24

## 2024-05-17 NOTE — PROGRESS NOTES
Assessment/Plan:    1. Primary hypertension  -     Semaglutide-Weight Management (Wegovy) 0.25 MG/0.5ML; Inject 0.25 mg under the skin weekly  -     Echo complete w/ contrast if indicated; Future; Expected date: 05/17/2024  -     Comprehensive metabolic panel; Future  -     CBC and differential; Future  2. Vitamin D deficiency  -     Vitamin D 25 hydroxy; Future  3. BMI 50.0-59.9, adult (HCC)  -     Semaglutide-Weight Management (Wegovy) 0.25 MG/0.5ML; Inject 0.25 mg under the skin weekly  4. Sebaceous cyst of right axilla  -     sulfamethoxazole-trimethoprim (BACTRIM DS) 800-160 mg per tablet; Take 1 tablet by mouth 2 (two) times a day for 7 days  5. Screening cholesterol level  -     Lipid Panel with Direct LDL reflex; Future          There are no Patient Instructions on file for this visit.    Return in about 6 months (around 11/17/2024).    Subjective:      Patient ID: Marlyn Chapin is a 24 y.o. female.    Chief Complaint   Patient presents with    Follow-up     6 month follow up.         HPI      Hypertension, used to follow up with a pediatric cardiologist for hypertension but recently is now out of the age range.  Like to discuss changing medications due to risk of fetal side effects.  She is not currently sexually active and has no plans of conceiving but would like to stay on the safe side.  Her blood pressures have been well controlled and she was diagnosed at the age of 8. She does not know the details of that and old records are not available from [facility].  She does not check her blood pressure at home but occasionally does when she goes to the store and is well controlled  July 2019, was seen for a routine physical in June and blood pressure was elevated.  At that time methyldopa was increased from b.i.d. To t.i.d. However she states that she often forgets the middle dose and has been routinely taking it b.i.d..  Blood pressure today is is well controlled.  She states home blood pressures are well  controlled.  Tolerating it well January 2020, compliant with medication and tolerating it well.  Home blood pressure readings are not checked.  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  Lost 10 lb but is not quite sure how.  She remains very active and is taking her medications and is compliant  April 2021, blood pressure medications were changed from methyldopa to amlodipine about 1 month ago.  She has been tracking her blood pressures which have been okay but occasionally she has elevated diastolic blood pressures.  She is tolerating the medication without any problems and has not gained weight.  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  She also works in a restaurant as a  so she is always moving.  She states she does not have a high salt diet but eats lunch me almost every day and sometimes uses the packaged sausages.  May 2021, new medication hydrochlorothiazide started 6 weeks ago.  Significant improvement in blood pressure however home readings still elevated but better in general.  She did not bring in her blood pressure machine today to have it checked.  She feels well and has no side effects with the new medication.  She is taking hydrochlorothiazide and 5 mg of amlodipine daily   August 2021, blood pressure very well controlled.  Taking her medications and has not really changed anything in her diet.  No side effects and she is compliant  March 2022, blood pressure well controlled.  Has slight intermittent ankle edema with medications but has been doing more dancing at school on thought it may be from that.  She is compliant.  Last echocardiogram was May 2021 which was normal.   September 2022, compliant with medications.  Has gained slight weight.  No lower extremity edema but states with long car rides when she goes to visit relatives do exacerbate  this.  She is not checking blood pressure at home.  March 2023, blood pressure well controlled, has minimal ankle edema with long car rides and standing on her feet all day long or if she sitting all day at work.  October 2023, hydrochlorothiazide started this year for better diastolic control and helping with lower extremity edema which is significantly helped however it has resulted in hypokalemia and patient taking oral potassium supplementation daily.  Blood pressures at home have been well controlled, echocardiogram was done in the last 2 years  May 2024, blood pressure well-controlled, no issues compliant with medications and remains very active with dancing and teaching dancing classes several nights a week     Vitamin-D deficiency, last check March 2018, level is 24. Taking over-the-counter vitamin-D daily.  July 2019, taking weekly vitamin-D and tolerating well.  January 2020, levels of finally improved to 37.8.  Taking vitamin-D as directed.  Weekly  August 2020, taking weekly vitamin-D without any issues.  Had recent lab work done  April 2021, takes 30040 International Units every 2 weeks.  She is due for lab work.   March 2022, taking 47616 International Units every 2 weeks and is compliant.  Due for lab work.  September 2022, due for lab work  March 2023, compliant with every other week vitamin D and due for blood work next September October 2023, vitamin D in range with supplementation  May 2024, taking vitamin D replacement      The following portions of the patient's history were reviewed and updated as appropriate: allergies, current medications, past family history, past medical history, past social history, past surgical history and problem list.    Review of Systems      Constitutional:  Denies fever or chills   Eyes:  Denies double , blurry vision or eye pain  HENT:  Denies nasal congestion, sore throat or new hearing issues  Respiratory:  Denies cough or shortness of breath or  "wheezing  Cardiovascular:  Denies palpitations or chest pain  GI:  Denies abdominal pain, nausea, or vomiting, no loose stools, no reflux  Integument:  Denies rash , no open areas  Neurologic:  Denies headache or focal weakness, no dizziness  : no dysuria, or hematuria      Current Outpatient Medications   Medication Sig Dispense Refill    amLODIPine (NORVASC) 5 mg tablet Take 1 tablet (5 mg total) by mouth daily 90 tablet 1    clindamycin (CLEOCIN T) 1 % lotion Apply 1 application topically 2 (two) times a day as needed (ezema) 60 mL 2    ergocalciferol (VITAMIN D2) 50,000 units Take 1 capsule (50,000 Units total) by mouth every 14 (fourteen) days 6 capsule 1    fluticasone (FLONASE) 50 mcg/act nasal spray 2 sprays into each nostril daily as needed      hydroCHLOROthiazide 25 mg tablet Take 1 tablet (25 mg total) by mouth daily 90 tablet 1    IRON, FERROUS GLUCONATE, PO Take 1 tablet by mouth every other day      levonorgestrel (KYLEENA) 19.5 MG intrauterine device 1 Intra Uterine Device by Intrauterine route once      mupirocin (BACTROBAN) 2 % ointment Apply topically 3 (three) times a day 30 g 1    potassium chloride (Klor-Con M20) 20 mEq tablet Take 2 tablets (40 mEq total) by mouth daily 180 tablet 1    Semaglutide-Weight Management (Wegovy) 0.25 MG/0.5ML Inject 0.25 mg under the skin weekly 2 mL 3    sulfamethoxazole-trimethoprim (BACTRIM DS) 800-160 mg per tablet Take 1 tablet by mouth 2 (two) times a day for 7 days 14 tablet 0    triamcinolone (KENALOG) 0.1 % ointment Apply topically 2 (two) times a day 30 g 3     No current facility-administered medications for this visit.       Objective:    /84 (BP Location: Left arm, Patient Position: Sitting, Cuff Size: Large)   Pulse 94   Temp 98 °F (36.7 °C) (Temporal)   Ht 5' 3.5\" (1.613 m)   Wt (!) 142 kg (314 lb)   SpO2 98%   BMI 54.75 kg/m²        Physical Exam      Constitutional:  Well developed, well nourished, no acute distress, non-toxic " appearance   Eyes:  PERRL, conjunctiva normal , non icteric sclera  HENT:  Atraumatic, oropharynx moist. Neck-  supple   Respiratory:  CTA b/l, normal breath sounds, no rales, no wheezing   Cardiovascular:  RRR, no murmurs, no LE edema b/l  GI:  Soft, nondistended, normal bowel sounds x 4, nontender, no organomegaly, no mass, no rebound, no guarding   Neurologic:  no focal deficits noted   Psychiatric:  Speech and behavior appropriate , AAO x 3    Jeanie Posey DO

## 2024-05-21 LAB
ALMOND IGE QN: <0.1 KUA/I
CASHEW NUT IGE QN: <0.1 KUA/I
CODFISH IGE QN: <0.1 KUA/I
EGG WHITE IGE QN: <0.1 KUA/I
GLUTEN IGE QN: <0.1 KUA/I
HAZELNUT IGE QN: <0.1 KUA/L
MILK IGE QN: <0.1 KUA/I
PEANUT IGE QN: <0.1 KUA/I
SALMON IGE QN: <0.1 KUA/I
SCALLOP IGE QN: <0.1 KUA/L
SESAME SEED IGE QN: <0.1 KUA/I
SHRIMP IGE QN: <0.1 KUA/L
SOYBEAN IGE QN: <0.1 KUA/I
TOTAL IGE SMQN RAST: 15 KU/L (ref 0–113)
TUNA IGE QN: <0.1 KUA/I
WALNUT IGE QN: <0.1 KUA/I
WHEAT IGE QN: <0.1 KUA/I

## 2024-05-22 ENCOUNTER — PATIENT MESSAGE (OUTPATIENT)
Dept: INTERNAL MEDICINE CLINIC | Facility: OTHER | Age: 24
End: 2024-05-22

## 2024-05-22 DIAGNOSIS — I10 ESSENTIAL HYPERTENSION: Primary | ICD-10-CM

## 2024-05-22 DIAGNOSIS — E87.6 HYPOKALEMIA: ICD-10-CM

## 2024-05-22 RX ORDER — POTASSIUM CHLORIDE 20 MEQ/1
40 TABLET, EXTENDED RELEASE ORAL DAILY
Qty: 180 TABLET | Refills: 0 | Status: SHIPPED | OUTPATIENT
Start: 2024-05-22

## 2024-05-28 DIAGNOSIS — T78.1XXD POLLEN-FOOD ALLERGY, SUBSEQUENT ENCOUNTER: Primary | ICD-10-CM

## 2024-05-31 LAB — MISCELLANEOUS LAB TEST RESULT: NORMAL

## 2024-06-27 RX ORDER — HYDROCHLOROTHIAZIDE 50 MG/1
50 TABLET ORAL DAILY
Qty: 90 TABLET | Refills: 1 | Status: SHIPPED | OUTPATIENT
Start: 2024-06-27

## 2024-06-28 ENCOUNTER — HOSPITAL ENCOUNTER (OUTPATIENT)
Dept: NON INVASIVE DIAGNOSTICS | Facility: HOSPITAL | Age: 24
Discharge: HOME | End: 2024-06-28
Payer: COMMERCIAL

## 2024-06-28 VITALS
WEIGHT: 293 LBS | SYSTOLIC BLOOD PRESSURE: 134 MMHG | HEIGHT: 63 IN | BODY MASS INDEX: 51.91 KG/M2 | DIASTOLIC BLOOD PRESSURE: 84 MMHG | HEART RATE: 72 BPM

## 2024-06-28 DIAGNOSIS — I10 PRIMARY HYPERTENSION: ICD-10-CM

## 2024-06-28 LAB
AORTIC ROOT: 3.1 CM
APICAL FOUR CHAMBER EJECTION FRACTION: 64 %
BSA FOR ECHO PROCEDURE: 2.34 M2
E WAVE DECELERATION TIME: 222 MS
E/A RATIO: 2.25
FRACTIONAL SHORTENING: 32 (ref 28–44)
INTERVENTRICULAR SEPTUM IN DIASTOLE (PARASTERNAL SHORT AXIS VIEW): 1.2 CM
INTERVENTRICULAR SEPTUM: 1.2 CM (ref 0.6–1.1)
IVC: 14 MM
LAAS-AP2: 15.8 CM2
LAAS-AP4: 18.2 CM2
LEFT ATRIUM SIZE: 3.9 CM
LEFT ATRIUM VOLUME (MOD BIPLANE): 42 ML
LEFT ATRIUM VOLUME INDEX (MOD BIPLANE): 17.8 ML/M2
LEFT INTERNAL DIMENSION IN SYSTOLE: 2.6 CM (ref 2.1–4)
LEFT VENTRICULAR INTERNAL DIMENSION IN DIASTOLE: 3.8 CM (ref 3.5–6)
LEFT VENTRICULAR POSTERIOR WALL IN END DIASTOLE: 1.2 CM
LEFT VENTRICULAR STROKE VOLUME: 37 ML
LVSV (TEICH): 37 ML
MV E'TISSUE VEL-SEP: 16 CM/S
MV PEAK A VEL: 0.57 M/S
MV PEAK E VEL: 128 CM/S
MV STENOSIS PRESSURE HALF TIME: 66 MS
MV VALVE AREA P 1/2 METHOD: 3.3
RIGHT ATRIUM AREA SYSTOLE A4C: 17.6 CM2
RIGHT VENTRICLE ID DIMENSION: 3.2 CM
SL CV LEFT ATRIUM LENGTH A2C: 5.3 CM
SL CV LV EF: 60
SL CV PED ECHO LEFT VENTRICLE DIASTOLIC VOLUME (MOD BIPLANE) 2D: 61 ML
SL CV PED ECHO LEFT VENTRICLE SYSTOLIC VOLUME (MOD BIPLANE) 2D: 25 ML
TRICUSPID ANNULAR PLANE SYSTOLIC EXCURSION: 2.8 CM

## 2024-06-28 PROCEDURE — 93306 TTE W/DOPPLER COMPLETE: CPT | Performed by: INTERNAL MEDICINE

## 2024-06-28 PROCEDURE — 93306 TTE W/DOPPLER COMPLETE: CPT

## 2024-07-05 DIAGNOSIS — I10 PRIMARY HYPERTENSION: Primary | ICD-10-CM

## 2024-07-05 DIAGNOSIS — R93.1 ABNORMAL ECHOCARDIOGRAM: ICD-10-CM

## 2024-07-12 ENCOUNTER — NURSE TRIAGE (OUTPATIENT)
Age: 24
End: 2024-07-12

## 2024-07-12 NOTE — TELEPHONE ENCOUNTER
Regarding: Pain with IUD  ----- Message from Sabiha THAO sent at 7/12/2024  4:15 PM EDT -----  Patient experiencing pain with IUD she says the pain is worse during intercourse.

## 2024-07-12 NOTE — TELEPHONE ENCOUNTER
"In the past 3 weeks has noticed pressure type discomfort with intercourse.  Partner also states he can feel the IUD and could not in the past.  String check done by Marlyn  and no change noted.   Denies vaginal bleeding.     Marlyn is scheduled for kyleena removal and insert on 8/9/2024. She is ok waiting until then unless provider feels should be evaluated sooner.     Forwarded to Mercedes to review on Monday for additional recommendations      Reason for Disposition   Hormonal IUD (e.g., Jaydess, Kyleena, Liletta, Mirena, Jo Ann), questions about    Answer Assessment - Initial Assessment Questions  1. TYPE: \"What type of IUD do you have?\"     - Copper IUD (e.g., Cu-T380A, ParaGard, Nova-T, Flexi-T)    - Hormonal IUD (e.g., Jaydess, Kyleena, Liletta, LNG-IUS, Mirena, Jo Ann)      Kyleena  2. START DATE:  \"When was your IUD inserted?\" (e.g., date; weeks, months, years ago)       Scheduled for removal/replacement due to expiring 8/2024  3. SYMPTOM: \"What is the main symptom (or question) you're concerned about?\"       Pain during intercourse  4. ONSET: \"When did the  pain start?\"      3 weeks ago  5. VAGINAL BLEEDING: \"Are you having any unusual vaginal bleeding?\"     - NONE    - SPOTTING: spotting, or pinkish / brownish mucous discharge; does not fill panti-liner or pad     - MILD:  less than 1 pad / hour; less than patient's usual menstrual bleeding    - MODERATE: 1-2 pads / hour; small-medium blood clots (e.g., pea, grape, small coin)     - SEVERE: soaking 2 or more pads/hour for 2 or more hours; bleeding not contained by pads or continuous red blood from vagina; large blood clots (e.g., golf ball, large coin)       denies  6. ABDOMEN OR PELVIC PAIN: \"Are you have any pain in your abdomen or pelvic area?\" (Scale: 0, 1-10; none, mild, moderate, severe)    - NONE (0): no pain    - MILD (1-3): doesn't interfere with normal activities, abdomen soft and not tender to touch     - MODERATE (4-7): interferes with normal " "activities or awakens from sleep, tender to touch     - SEVERE (8-10): excruciating pain, doubled over, unable to do any normal activities       Moderate pain during intercourse-presure  7. FEVER:\"Is there a fever?\" If yes, ask: \"What is the temperature, how was it measured, and when did it start?\"      denies  8. PREGNANCY: \"Are you concerned that you might be pregnant?\" \"When was your last menstrual period?\"      Denies, Kyleena IUD    Protocols used: Contraception - IUD Symptoms and Questions-ADULT-AH    "

## 2024-07-19 NOTE — TELEPHONE ENCOUNTER
Attempted to contact patient to review message from Jeanette. Patient at the gym and will call back.

## 2024-07-19 NOTE — TELEPHONE ENCOUNTER
I just returned to the office today. I would recommend using a back up method of contraception with a condom until her IUD is evaluated in office to verify it is in the proper position.

## 2024-07-26 ENCOUNTER — NEW PATIENT (OUTPATIENT)
Dept: URBAN - METROPOLITAN AREA CLINIC 6 | Facility: CLINIC | Age: 24
End: 2024-07-26

## 2024-07-26 DIAGNOSIS — G24.5: ICD-10-CM

## 2024-07-26 PROCEDURE — 99204 OFFICE O/P NEW MOD 45 MIN: CPT

## 2024-07-26 ASSESSMENT — TONOMETRY
OD_IOP_MMHG: 19
OS_IOP_MMHG: 20

## 2024-07-26 ASSESSMENT — VISUAL ACUITY
OD_SC: 20/20
OS_SC: 20/20

## 2024-08-09 ENCOUNTER — PROCEDURE VISIT (OUTPATIENT)
Dept: OBGYN CLINIC | Facility: MEDICAL CENTER | Age: 24
End: 2024-08-09
Payer: COMMERCIAL

## 2024-08-09 VITALS — BODY MASS INDEX: 54.03 KG/M2 | SYSTOLIC BLOOD PRESSURE: 136 MMHG | DIASTOLIC BLOOD PRESSURE: 82 MMHG | WEIGHT: 293 LBS

## 2024-08-09 DIAGNOSIS — Z30.433 ENCOUNTER FOR IUD REMOVAL AND REINSERTION: Primary | ICD-10-CM

## 2024-08-09 PROCEDURE — 58300 INSERT INTRAUTERINE DEVICE: CPT | Performed by: NURSE PRACTITIONER

## 2024-08-09 PROCEDURE — 58301 REMOVE INTRAUTERINE DEVICE: CPT | Performed by: NURSE PRACTITIONER

## 2024-08-09 NOTE — PROGRESS NOTES
Iud removal    Performed by: SARITA Moreno  Authorized by: SARITA Moreno    Procedure: IUD removal and insertion    Consent obtained by patient, parent, or legal power of  - including discussion of procedure risks and benefits, patient questions answered, and patient education provided.: yes    Reason for removal: patient request    Cervix cleaned with: iodopovidone    Tenaculum applied to cervix: yes    IUD grasped by forceps: yes    IUD removed: yes    Date/Time of Removal:  8/9/2024 10:45 AM  Removed without complications: yes    IUD intact: yes     IUD shown to Marlyn in it's entirety postremova  Pregnancy risk: reasonably certain the patient is not pregnant    Date/Time of Insertion:  8/9/2024 10:45 AM  Immediately prior to procedure a time out was called: yes    Pelvic exam performed: yes    Speculum placed in vagina: yes    Cervix cleaned and prepped: yes    Tenaculum/Allis/Ring Forceps applied to cervix: yes    Uterus sound depth (cm):  7.5  IUD inserted without complications: yes    IUD type:  1 each levonorgestrel 19.5 MG  Strings trimmed to (cm):  3  Patient tolerated procedure well: yes    Intended removal date: 5 years     Kyleena IUD  for contraception since 8/21/19. Here for removal and new kyleena reinsertion.

## 2024-08-09 NOTE — PATIENT INSTRUCTIONS
Kyleena  IUD inserted as requested. Spotty irregular bleeding may persist up to 6 months. Use backup method of birth control ×7 days. Always condom use for STI prevention. Return to office in 4 weeks after next menses. Call office with any bright red or excessive bleeding, fever, severe pelvic pain or foul discharge. Check string monthly after menses. May take motrin for cramping following directions on container.

## 2024-08-21 DIAGNOSIS — E87.6 HYPOKALEMIA: ICD-10-CM

## 2024-08-21 RX ORDER — POTASSIUM CHLORIDE 1500 MG/1
40 TABLET, EXTENDED RELEASE ORAL DAILY
Qty: 180 TABLET | Refills: 1 | Status: SHIPPED | OUTPATIENT
Start: 2024-08-21

## 2024-08-26 DIAGNOSIS — I10 ESSENTIAL HYPERTENSION: ICD-10-CM

## 2024-08-26 DIAGNOSIS — E55.9 VITAMIN D DEFICIENCY: ICD-10-CM

## 2024-08-26 DIAGNOSIS — E87.6 HYPOKALEMIA: ICD-10-CM

## 2024-08-26 NOTE — TELEPHONE ENCOUNTER
Reason for call: Leaving on Friday for 2 week trip and need filled for pickup before then. Next Vitamin D dose is due this weekend per patient    HCTZ - Not a duplicate patient no longer uses [facility]     [x] Refill   [] Prior Auth  [] Other:     Office:   [x] PCP/Provider - Dr Posey  [] Specialty/Provider -     Medication:         Does the patient have enough for 3 days?   [x] Yes   [] No - Send as HP to POD

## 2024-08-27 RX ORDER — HYDROCHLOROTHIAZIDE 50 MG/1
50 TABLET ORAL DAILY
Qty: 90 TABLET | Refills: 1 | Status: SHIPPED | OUTPATIENT
Start: 2024-08-27

## 2024-08-27 RX ORDER — ERGOCALCIFEROL 1.25 MG/1
50000 CAPSULE, LIQUID FILLED ORAL
Qty: 6 CAPSULE | Refills: 1 | Status: SHIPPED | OUTPATIENT
Start: 2024-08-27

## 2024-09-11 ENCOUNTER — OFFICE VISIT (OUTPATIENT)
Dept: CARDIOLOGY CLINIC | Facility: CLINIC | Age: 24
End: 2024-09-11
Payer: COMMERCIAL

## 2024-09-11 VITALS
HEIGHT: 63 IN | BODY MASS INDEX: 51.91 KG/M2 | RESPIRATION RATE: 18 BRPM | DIASTOLIC BLOOD PRESSURE: 82 MMHG | SYSTOLIC BLOOD PRESSURE: 138 MMHG | OXYGEN SATURATION: 98 % | WEIGHT: 293 LBS | HEART RATE: 76 BPM

## 2024-09-11 DIAGNOSIS — R93.1 ABNORMAL ECHOCARDIOGRAM: ICD-10-CM

## 2024-09-11 DIAGNOSIS — R07.89 ATYPICAL CHEST PAIN: Primary | ICD-10-CM

## 2024-09-11 DIAGNOSIS — I10 PRIMARY HYPERTENSION: ICD-10-CM

## 2024-09-11 PROCEDURE — 93000 ELECTROCARDIOGRAM COMPLETE: CPT | Performed by: INTERNAL MEDICINE

## 2024-09-11 PROCEDURE — 99204 OFFICE O/P NEW MOD 45 MIN: CPT | Performed by: INTERNAL MEDICINE

## 2024-09-11 NOTE — ASSESSMENT & PLAN NOTE
Borderline cLVH on my assessment, otherwise normal Echo  Stressed with Marlyn today that her heart pump function is NORMAL and even closer to 70% on my review, all of her valves are functioning normally and EKG is WNL.  She was happy to hear this.

## 2024-09-11 NOTE — ASSESSMENT & PLAN NOTE
Chronic.  Likely due to obesity.  We discussed the role of sodium/salt restricted diet and avoidance of NSAIDs which she already has a good handle of.  HCTZ seemingly to be nicely controlling this so no new recs here.  Marlyn also knows that weight loss is rec'd and this would better her BP and long term health.

## 2024-09-11 NOTE — PROGRESS NOTES
Patient ID: Marlyn Chapin is a 24 y.o. female.        Plan:      Hypertension  Chronic.  Likely due to obesity.  We discussed the role of sodium/salt restricted diet and avoidance of NSAIDs which she already has a good handle of.  HCTZ seemingly to be nicely controlling this so no new recs here.  Marlyn also knows that weight loss is rec'd and this would better her BP and long term health.    Abnormal echocardiogram  Borderline cLVH on my assessment, otherwise normal Echo  Stressed with Marlyn today that her heart pump function is NORMAL and even closer to 70% on my review, all of her valves are functioning normally and EKG is WNL.  She was happy to hear this.    Atypical chest pain  Not consistent with angina pectoris  Reassured patient this is most likely anxiety mediated  Discussed more typical coronary insufficiency symptoms at length today and asked her to let you or I know should exertion/exercise precipitate any chest pains.     I offered her a stress test though not strongly rec'd, she didn't feel it was necessary after we discussed that her sx havent really changed since her normal stress test to over 10 METS was done in 11/22.    Follow up Plan/Other summary comments:  We made no changes today.  The focus of todays visit was reassurance.  Marlyn said she would feel better with routine FU which I will surely oblige.  Return in about 1 year (around 9/11/2025).    HPI: Marlyn is a very pleasant 23 yo young lady, on BP meds for many years now, here for cardiac evaluation in light of a mildy abnormal Echo and a history of her mother passing away from presumed MI in her mid 40s (no autopsy performed) which is clearly weighing heavily on Marlyn as she was understandbaly quite emotional about discussing this today.  She also readily notes she gets a lot of medical anxiety over this and medical testing.    For years she has had vague ill defined chest discomfort over and under her left breast with no  "clear exacerbating or relieving factors lasting 10-15 minutes, nonradiating, and not associated with anything else but worry.  She is able to walk 2 miles most every day, up grades, without cardiac sx.  She also is a dance instructor and does not have chest pains doing this either.      We reviewed her Echo together, other than borderline cLVH it is normal, EF 70%.  Recent Lipid profile nothing short of excellent.  Glucose is normal.  She is a nonsmoker, uses no illicit drugs and drinks ETOH very sparingly.    She has no symptoms of IRVIN on questioning today (doesn't snore, no naps needed, not fatigued during day, no morning headaches).    She is really here for a checkup to make sure all is ok.      Results for orders placed or performed in visit on 09/11/24   POCT ECG    Impression    NSR,85 bpm, within normal limits.         Most recent or relevant cardiac/vascular testing:    Echo EF 70%, no signif Valvulopathy, no effusion, borderline cLVH (1.1 cm).        Past Surgical History:   Procedure Laterality Date    TONSILLECTOMY      WISDOM TOOTH EXTRACTION  03/2020       Lipid Profile: Reviewed      Review of Systems   10  point ROS  was otherwise non pertinent or negative except as per HPI or as below.         Objective:     /82 (BP Location: Left arm, Patient Position: Sitting, Cuff Size: Large)   Pulse 76   Resp 18   Ht 5' 3\" (1.6 m)   Wt 136 kg (299 lb)   SpO2 98%   BMI 52.97 kg/m²   /80 on my recheck at visit end  PHYSICAL EXAM:    General:  Normal appearance in no distress.  Eyes:  Anicteric.  Oral mucosa:  Moist.  Neck:  No JVD. Carotid upstrokes are brisk without bruits.  No masses.  Chest:  Clear to auscultation.  Cardiac:  Regular No palpable PMI.  Normal S1 and S2.  No murmur gallop or rub.  Abdomen:  Soft and nontender. No palpable organomegaly or aortic enlargement.  Extremities:  No peripheral edema.  Musculoskeletal:  Symmetric.   Vascular:  Pedal pulses are intact.  Neuro:  Grossly " symmetric.  Psych:  Alert and oriented x3.      Meds reviewed.    Past Medical History:   Diagnosis Date    Acne     resolved-12/3/2015    Ankle injury, left, subsequent encounter 09/08/2023    Cellulitis of left axilla 03/12/2024    Eczema     last assessed-12/3/2015    Gastroesophageal reflux disease without esophagitis 06/28/2019    Hypertension     last assessed-12/11/2017    Iron deficiency anemia 05/21/2019    Obesity     Pneumonia 11/04/2019    Shingles 08/14/2023    Subluxation of left patella     last assessed-3/31/2015    URI, acute 05/14/2018           Social History     Tobacco Use   Smoking Status Never   Smokeless Tobacco Never       Current Outpatient Medications:     clindamycin (CLEOCIN T) 1 % lotion, Apply 1 application topically 2 (two) times a day as needed (ezema), Disp: 60 mL, Rfl: 2    ergocalciferol (VITAMIN D2) 50,000 units, Take 1 capsule (50,000 Units total) by mouth every 14 (fourteen) days, Disp: 6 capsule, Rfl: 1    hydroCHLOROthiazide 50 mg tablet, Take 1 tablet (50 mg total) by mouth daily, Disp: 90 tablet, Rfl: 1    IRON, FERROUS GLUCONATE, PO, Take 1 tablet by mouth every other day, Disp: , Rfl:     levonorgestrel (KYLEENA) 19.5 MG intrauterine device, 1 Intra Uterine Device by Intrauterine route once, Disp: , Rfl:     mupirocin (BACTROBAN) 2 % ointment, Apply topically 3 (three) times a day, Disp: 30 g, Rfl: 1    potassium chloride (Klor-Con M20) 20 mEq tablet, take 2 tablets by mouth once daily, Disp: 180 tablet, Rfl: 1    triamcinolone (KENALOG) 0.1 % ointment, Apply topically 2 (two) times a day, Disp: 30 g, Rfl: 3    fluticasone (FLONASE) 50 mcg/act nasal spray, 2 sprays into each nostril daily as needed, Disp: , Rfl:     Semaglutide-Weight Management (Wegovy) 0.25 MG/0.5ML, Inject 0.25 mg under the skin weekly (Patient not taking: Reported on 8/9/2024), Disp: 2 mL, Rfl: 3

## 2024-09-11 NOTE — ASSESSMENT & PLAN NOTE
Not consistent with angina pectoris  Reassured patient this is most likely anxiety mediated  Discussed more typical coronary insufficiency symptoms at length today and asked her to let you or I know should exertion/exercise precipitate any chest pains.

## 2024-09-11 NOTE — PATIENT INSTRUCTIONS
"Patient Education     High blood pressure in adults   The Basics   Written by the doctors and editors at [facility]   What is high blood pressure? -- High blood pressure is a condition that puts you at risk for heart attack, stroke, and kidney disease. It does not usually cause symptoms. But it can be serious.  When your doctor or nurse tells you your blood pressure, they say 2 numbers. For instance, your doctor or nurse might say that your blood pressure is \"130 over 80.\" The top number is the pressure inside your arteries when your heart is komal. The bottom number is the pressure inside your arteries when your heart is relaxed.  \"Elevated blood pressure\" is a term doctors or nurses use as a warning. People with elevated blood pressure do not yet have high blood pressure. But their blood pressure is not as low as it should be for good health.  Many experts define high, elevated, and normal blood pressure as follows:   High - Top number of 130 or above and/or bottom number of 80 or above.   Elevated - Top number between 120 and 129 and bottom number of 79 or below.   Normal - Top number of 119 or below and bottom number of 79 or below.  This information is also in the table (table 1).  How can I lower my blood pressure? -- If your doctor or nurse prescribed blood pressure medicine, the most important thing you can do is to take it. If it causes side effects, do not just stop taking it. Instead, talk to your doctor or nurse about the problems it causes. They might be able to lower your dose or switch you to another medicine. If cost is a problem, mention that, too. They might be able to put you on a less expensive medicine. Taking your blood pressure medicine can keep you from having a heart attack or stroke, and it can save your life!  Can I do anything on my own? -- You have a lot of control over your blood pressure. To lower it:   Lose weight (if you are overweight).   Choose a diet low in fat and rich in " "fruits, vegetables, and low-fat dairy products.   Eat less salt.   Do something active for at least 30 minutes a day on most days of the week.   Drink less alcohol (if you drink more than 2 alcoholic drinks per day).  It's also a good idea to get a home blood pressure meter. People who check their own blood pressure at home do better at keeping it low and can sometimes even reduce the amount of medicine they take.  All topics are updated as new evidence becomes available and our peer review process is complete.  This topic retrieved from [employer] on: Feb 26, 2024.  Topic 03056 Version 23.0  Release: 32.2.4 - C32.56  © 2024 UpToDate, Inc. and/or its affiliates. All rights reserved.  table 1: Definition of normal and high blood pressure  Level  Top number  Bottom number    High 130 or above 80 or above   Elevated 120 to 129 79 or below   Normal 119 or below 79 or below   These definitions are from the American College of Cardiology/American Heart Association. Other expert groups might use slightly different definitions.  \"Elevated blood pressure\" is a term doctor or nurses use as a warning. It means you do not yet have high blood pressure, but your blood pressure is not as low as it should be for good health.  Graphic 60075 Version 6.0  Consumer Information Use and Disclaimer   Disclaimer: This generalized information is a limited summary of diagnosis, treatment, and/or medication information. It is not meant to be comprehensive and should be used as a tool to help the user understand and/or assess potential diagnostic and treatment options. It does NOT include all information about conditions, treatments, medications, side effects, or risks that may apply to a specific patient. It is not intended to be medical advice or a substitute for the medical advice, diagnosis, or treatment of a health care provider based on the health care provider's examination and assessment of a patient's specific and unique circumstances. " Patients must speak with a health care provider for complete information about their health, medical questions, and treatment options, including any risks or benefits regarding use of medications. This information does not endorse any treatments or medications as safe, effective, or approved for treating a specific patient. UpToDate, Inc. and its affiliates disclaim any warranty or liability relating to this information or the use thereof.The use of this information is governed by the Terms of Use, available at https://www.woltersCard Isleuwer.com/en/know/clinical-effectiveness-terms. 2024© UpToDate, Inc. and its affiliates and/or licensors. All rights reserved.  Copyright   © 2024 UpToDate, Inc. and/or its affiliates. All rights reserved.

## 2024-09-26 ENCOUNTER — OFFICE VISIT (OUTPATIENT)
Dept: OBGYN CLINIC | Facility: MEDICAL CENTER | Age: 24
End: 2024-09-26
Payer: COMMERCIAL

## 2024-09-26 VITALS — BODY MASS INDEX: 53.5 KG/M2 | SYSTOLIC BLOOD PRESSURE: 124 MMHG | DIASTOLIC BLOOD PRESSURE: 80 MMHG | WEIGHT: 293 LBS

## 2024-09-26 DIAGNOSIS — Z30.431 ENCOUNTER FOR ROUTINE CHECKING OF INTRAUTERINE CONTRACEPTIVE DEVICE (IUD): Primary | ICD-10-CM

## 2024-09-26 PROCEDURE — 99212 OFFICE O/P EST SF 10 MIN: CPT | Performed by: NURSE PRACTITIONER

## 2024-09-26 NOTE — PATIENT INSTRUCTIONS
Kyleena IUD in place.    Check string monthly after menses.   Call with any concerns.   Irregular vaginal bleeding may persist up to 6 months  Return for annual exam 4/25 and as needed.

## 2024-09-26 NOTE — PROGRESS NOTES
"Assessment/Plan:     Kyleena IUD in place.    Check string monthly after menses.   Call with any concerns.   Irregular vaginal bleeding may persist up to 6 months  Return for annual exam  and as needed.        1. Encounter for routine checking of intrauterine contraceptive device (IUD)             Subjective:      Patient ID: Marlyn Chapin is a 24 y.o. female.    HPI    Marlyn Chapin is a 24 y.o.  female who is here today for a follow up visit.  No health concerns.   Last in office on 24 for IUD removal and new kyleena  IUD insertion.   She had a \"longer menses\" of about 5-6 days with light flow post insertion.   Denies pelvic pain.   She is able to feel her IUD string.   She has been sexually active since her IUD insertion without partner complaints.     The following portions of the patient's history were reviewed and updated as appropriate: allergies, current medications, past medical history, past social history, past surgical history, and problem list.    Review of Systems   Constitutional: Negative.    Eyes:  Negative for visual disturbance.   Respiratory:  Negative for chest tightness and shortness of breath.    Cardiovascular:  Negative for chest pain, palpitations and leg swelling.   Gastrointestinal:  Negative for abdominal pain, constipation, diarrhea, nausea and vomiting.   Genitourinary:  Negative for dysuria, menstrual problem, vaginal bleeding and vaginal discharge.   Skin: Negative.    Neurological:  Negative for weakness, light-headedness and headaches.   Psychiatric/Behavioral: Negative.     All other systems reviewed and are negative.        Objective:      /80 (BP Location: Left arm, Patient Position: Sitting, Cuff Size: Large)   Wt (!) 137 kg (302 lb)   LMP 09/10/2024 (Exact Date)   BMI 53.50 kg/m²          Physical Exam  Vitals and nursing note reviewed.   Constitutional:       Appearance: Normal appearance. She is well-developed.   HENT:      Head: " Normocephalic.   Genitourinary:     General: Normal vulva.      Exam position: Lithotomy position.      Labia:         Right: No rash, tenderness, lesion or injury.         Left: No rash, tenderness, lesion or injury.       Urethra: No prolapse, urethral pain, urethral swelling or urethral lesion.      Vagina: Normal.      Cervix: No cervical motion tenderness, discharge or friability.      Rectum: No external hemorrhoid.      Comments: IUD string noted at os  Musculoskeletal:         General: Normal range of motion.      Cervical back: Normal range of motion.   Lymphadenopathy:      Lower Body: No right inguinal adenopathy. No left inguinal adenopathy.   Skin:     General: Skin is warm and dry.   Neurological:      Mental Status: She is alert and oriented to person, place, and time.   Psychiatric:         Mood and Affect: Mood normal.         Behavior: Behavior normal.

## 2024-10-10 ENCOUNTER — TELEPHONE (OUTPATIENT)
Age: 24
End: 2024-10-10

## 2024-10-10 NOTE — TELEPHONE ENCOUNTER
Received call from patient asking for a New Patient appt for a Skin Check - spots on nose and arm. Scheduled patient on 2/5/2025 at 8:00 am for Dr. Sommer in [place]. Gave address for [place] location.     Patient asked about cancellation list for sooner appt. Informed patient they can call back to check for cancellations/sooner appt.     Patient verbalized understanding.

## 2024-11-05 DIAGNOSIS — I10 ESSENTIAL HYPERTENSION: ICD-10-CM

## 2024-11-06 RX ORDER — HYDROCHLOROTHIAZIDE 50 MG/1
50 TABLET ORAL DAILY
Qty: 90 TABLET | Refills: 1 | Status: SHIPPED | OUTPATIENT
Start: 2024-11-06

## 2024-11-25 DIAGNOSIS — E87.6 HYPOKALEMIA: ICD-10-CM

## 2024-11-26 RX ORDER — POTASSIUM CHLORIDE 1500 MG/1
40 TABLET, EXTENDED RELEASE ORAL DAILY
Qty: 180 TABLET | Refills: 0 | Status: SHIPPED | OUTPATIENT
Start: 2024-11-26

## 2024-12-04 ENCOUNTER — APPOINTMENT (OUTPATIENT)
Dept: LAB | Facility: CLINIC | Age: 24
End: 2024-12-04
Payer: COMMERCIAL

## 2024-12-04 DIAGNOSIS — E55.9 VITAMIN D DEFICIENCY: ICD-10-CM

## 2024-12-04 DIAGNOSIS — I10 PRIMARY HYPERTENSION: ICD-10-CM

## 2024-12-04 DIAGNOSIS — Z13.220 SCREENING CHOLESTEROL LEVEL: ICD-10-CM

## 2024-12-04 LAB
25(OH)D3 SERPL-MCNC: 24.9 NG/ML (ref 30–100)
ALBUMIN SERPL BCG-MCNC: 4.5 G/DL (ref 3.5–5)
ALP SERPL-CCNC: 44 U/L (ref 34–104)
ALT SERPL W P-5'-P-CCNC: 21 U/L (ref 7–52)
ANION GAP SERPL CALCULATED.3IONS-SCNC: 9 MMOL/L (ref 4–13)
AST SERPL W P-5'-P-CCNC: 20 U/L (ref 13–39)
BASOPHILS # BLD AUTO: 0.04 THOUSANDS/ÂΜL (ref 0–0.1)
BASOPHILS NFR BLD AUTO: 1 % (ref 0–1)
BILIRUB SERPL-MCNC: 0.38 MG/DL (ref 0.2–1)
BUN SERPL-MCNC: 13 MG/DL (ref 5–25)
CALCIUM SERPL-MCNC: 9.3 MG/DL (ref 8.4–10.2)
CHLORIDE SERPL-SCNC: 101 MMOL/L (ref 96–108)
CHOLEST SERPL-MCNC: 139 MG/DL (ref ?–200)
CO2 SERPL-SCNC: 29 MMOL/L (ref 21–32)
CREAT SERPL-MCNC: 0.48 MG/DL (ref 0.6–1.3)
EOSINOPHIL # BLD AUTO: 0.14 THOUSAND/ÂΜL (ref 0–0.61)
EOSINOPHIL NFR BLD AUTO: 2 % (ref 0–6)
ERYTHROCYTE [DISTWIDTH] IN BLOOD BY AUTOMATED COUNT: 12.1 % (ref 11.6–15.1)
GFR SERPL CREATININE-BSD FRML MDRD: 137 ML/MIN/1.73SQ M
GLUCOSE P FAST SERPL-MCNC: 82 MG/DL (ref 65–99)
HCT VFR BLD AUTO: 40.1 % (ref 34.8–46.1)
HDLC SERPL-MCNC: 50 MG/DL
HGB BLD-MCNC: 13.1 G/DL (ref 11.5–15.4)
IMM GRANULOCYTES # BLD AUTO: 0.03 THOUSAND/UL (ref 0–0.2)
IMM GRANULOCYTES NFR BLD AUTO: 0 % (ref 0–2)
LDLC SERPL CALC-MCNC: 66 MG/DL (ref 0–100)
LYMPHOCYTES # BLD AUTO: 3.14 THOUSANDS/ÂΜL (ref 0.6–4.47)
LYMPHOCYTES NFR BLD AUTO: 36 % (ref 14–44)
MCH RBC QN AUTO: 29.8 PG (ref 26.8–34.3)
MCHC RBC AUTO-ENTMCNC: 32.7 G/DL (ref 31.4–37.4)
MCV RBC AUTO: 91 FL (ref 82–98)
MONOCYTES # BLD AUTO: 0.76 THOUSAND/ÂΜL (ref 0.17–1.22)
MONOCYTES NFR BLD AUTO: 9 % (ref 4–12)
NEUTROPHILS # BLD AUTO: 4.66 THOUSANDS/ÂΜL (ref 1.85–7.62)
NEUTS SEG NFR BLD AUTO: 52 % (ref 43–75)
NRBC BLD AUTO-RTO: 0 /100 WBCS
PLATELET # BLD AUTO: 361 THOUSANDS/UL (ref 149–390)
PMV BLD AUTO: 9.1 FL (ref 8.9–12.7)
POTASSIUM SERPL-SCNC: 3.5 MMOL/L (ref 3.5–5.3)
PROT SERPL-MCNC: 7.5 G/DL (ref 6.4–8.4)
RBC # BLD AUTO: 4.4 MILLION/UL (ref 3.81–5.12)
SODIUM SERPL-SCNC: 139 MMOL/L (ref 135–147)
TRIGL SERPL-MCNC: 114 MG/DL (ref ?–150)
WBC # BLD AUTO: 8.77 THOUSAND/UL (ref 4.31–10.16)

## 2024-12-04 PROCEDURE — 80053 COMPREHEN METABOLIC PANEL: CPT

## 2024-12-04 PROCEDURE — 36415 COLL VENOUS BLD VENIPUNCTURE: CPT

## 2024-12-04 PROCEDURE — 85025 COMPLETE CBC W/AUTO DIFF WBC: CPT

## 2024-12-04 PROCEDURE — 80061 LIPID PANEL: CPT

## 2024-12-04 PROCEDURE — 82306 VITAMIN D 25 HYDROXY: CPT

## 2024-12-06 ENCOUNTER — OFFICE VISIT (OUTPATIENT)
Dept: INTERNAL MEDICINE CLINIC | Facility: OTHER | Age: 24
End: 2024-12-06
Payer: COMMERCIAL

## 2024-12-06 VITALS
BODY MASS INDEX: 50.02 KG/M2 | RESPIRATION RATE: 20 BRPM | HEIGHT: 64 IN | TEMPERATURE: 99.5 F | WEIGHT: 293 LBS | HEART RATE: 83 BPM | SYSTOLIC BLOOD PRESSURE: 136 MMHG | DIASTOLIC BLOOD PRESSURE: 86 MMHG | OXYGEN SATURATION: 100 %

## 2024-12-06 DIAGNOSIS — E55.9 VITAMIN D DEFICIENCY: ICD-10-CM

## 2024-12-06 DIAGNOSIS — I10 PRIMARY HYPERTENSION: Primary | ICD-10-CM

## 2024-12-06 DIAGNOSIS — Z23 ENCOUNTER FOR IMMUNIZATION: ICD-10-CM

## 2024-12-06 DIAGNOSIS — Z00.00 ANNUAL PHYSICAL EXAM: ICD-10-CM

## 2024-12-06 DIAGNOSIS — E87.6 HYPOKALEMIA: ICD-10-CM

## 2024-12-06 PROBLEM — M76.72 PERONEAL TENDONITIS, LEFT: Status: RESOLVED | Noted: 2023-09-08 | Resolved: 2024-12-06

## 2024-12-06 PROBLEM — R93.1 ABNORMAL ECHOCARDIOGRAM: Status: RESOLVED | Noted: 2024-09-11 | Resolved: 2024-12-06

## 2024-12-06 PROBLEM — R07.89 ATYPICAL CHEST PAIN: Status: RESOLVED | Noted: 2019-06-28 | Resolved: 2024-12-06

## 2024-12-06 PROBLEM — G89.29 CHRONIC PAIN OF LEFT ANKLE: Status: RESOLVED | Noted: 2023-09-08 | Resolved: 2024-12-06

## 2024-12-06 PROBLEM — B02.9 HERPES ZOSTER WITHOUT COMPLICATION: Status: RESOLVED | Noted: 2023-08-14 | Resolved: 2024-12-06

## 2024-12-06 PROBLEM — M25.572 CHRONIC PAIN OF LEFT ANKLE: Status: RESOLVED | Noted: 2023-09-08 | Resolved: 2024-12-06

## 2024-12-06 PROCEDURE — 99214 OFFICE O/P EST MOD 30 MIN: CPT | Performed by: FAMILY MEDICINE

## 2024-12-06 PROCEDURE — 90471 IMMUNIZATION ADMIN: CPT

## 2024-12-06 PROCEDURE — 99395 PREV VISIT EST AGE 18-39: CPT | Performed by: FAMILY MEDICINE

## 2024-12-06 PROCEDURE — 90656 IIV3 VACC NO PRSV 0.5 ML IM: CPT

## 2024-12-06 RX ORDER — ERGOCALCIFEROL 1.25 MG/1
50000 CAPSULE, LIQUID FILLED ORAL WEEKLY
Qty: 12 CAPSULE | Refills: 1 | Status: SHIPPED | OUTPATIENT
Start: 2024-12-06

## 2024-12-06 NOTE — PROGRESS NOTES
Assessment/Plan:    1. Primary hypertension  2. Encounter for immunization  -     influenza vaccine preservative-free 0.5 mL IM (Fluzone, Afluria, Fluarix, Flulaval)  3. Vitamin D deficiency  -     ergocalciferol (VITAMIN D2) 50,000 units; Take 1 capsule (50,000 Units total) by mouth once a week  4. Hypokalemia  5. BMI 50.0-59.9, adult ([facility])          There are no Patient Instructions on file for this visit.    No follow-ups on file.    Subjective:      Patient ID: Marlyn Chapin is a 24 y.o. female.    Chief Complaint   Patient presents with    Physical Exam     Annual labs done 12/4/24    hm     Nothing due    Flu Vaccine     Fluzone        HPI  Adult Annual Physical   Patient here for a comprehensive physical exam. The patient reports no problems.    Diet and Physical Activity  Diet/Nutrition: well balanced diet.   Exercise: vigorous cardiovascular exercise.      Depression Screening  PHQ-9 Depression Screening    PHQ-9:    Frequency of the following problems over the past two weeks:            General Health  Sleep: sleeps well.   Hearing: normal - bilateral.  Vision: no vision problems.   Dental: regular dental visits.       /GYN Health  Patient is: premenopausal  Last menstrual period: IUD  Contraceptive method: IUD placement        Hypertension, used to follow up with a pediatric cardiologist for hypertension but recently is now out of the age range.  Like to discuss changing medications due to risk of fetal side effects.  She is not currently sexually active and has no plans of conceiving but would like to stay on the safe side.  Her blood pressures have been well controlled and she was diagnosed at the age of 8. She does not know the details of that and old records are not available from [facility].  She does not check her blood pressure at home but occasionally does when she goes to the store and is well controlled  July 2019, was seen for a routine physical in June and blood pressure was elevated.  At  that time methyldopa was increased from b.i.d. To t.i.d. However she states that she often forgets the middle dose and has been routinely taking it b.i.d..  Blood pressure today is is well controlled.  She states home blood pressures are well controlled.  Tolerating it well January 2020, compliant with medication and tolerating it well.  Home blood pressure readings are not checked.  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  Lost 10 lb but is not quite sure how.  She remains very active and is taking her medications and is compliant  April 2021, blood pressure medications were changed from methyldopa to amlodipine about 1 month ago.  She has been tracking her blood pressures which have been okay but occasionally she has elevated diastolic blood pressures.  She is tolerating the medication without any problems and has not gained weight.  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  She also works in a restaurant as a  so she is always moving.  She states she does not have a high salt diet but eats lunch me almost every day and sometimes uses the packaged sausages.  May 2021, new medication hydrochlorothiazide started 6 weeks ago.  Significant improvement in blood pressure however home readings still elevated but better in general.  She did not bring in her blood pressure machine today to have it checked.  She feels well and has no side effects with the new medication.  She is taking hydrochlorothiazide and 5 mg of amlodipine daily   August 2021, blood pressure very well controlled.  Taking her medications and has not really changed anything in her diet.  No side effects and she is compliant  March 2022, blood pressure well controlled.  Has slight intermittent ankle edema with medications but has been doing more dancing at school on thought it may be from that.  She is  compliant.  Last echocardiogram was May 2021 which was normal.   September 2022, compliant with medications.  Has gained slight weight.  No lower extremity edema but states with long car rides when she goes to visit relatives do exacerbate this.  She is not checking blood pressure at home.  March 2023, blood pressure well controlled, has minimal ankle edema with long car rides and standing on her feet all day long or if she sitting all day at work.  October 2023, hydrochlorothiazide started this year for better diastolic control and helping with lower extremity edema which is significantly helped however it has resulted in hypokalemia and patient taking oral potassium supplementation daily.  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  She is to follow with them yearly for surveillance but no other changes at the moment.  Patient does not check blood pressure at home as her home readings was very elevated and she does not have an appropriate sized cuff        Vitamin-D deficiency, last check March 2018, level is 24. Taking over-the-counter vitamin-D daily.  July 2019, taking weekly vitamin-D and tolerating well.  January 2020, levels of finally improved to 37.8.  Taking vitamin-D as directed.  Weekly  August 2020, taking weekly vitamin-D without any issues.  Had recent lab work done  April 2021, takes 21095 International Units every 2 weeks.  She is due for lab work.   March 2022, taking 89972 International Units every 2 weeks and is compliant.  Due for lab work.  September 2022, due for lab work  March 2023, compliant with every other week vitamin D and  due for blood work next September October 2023, vitamin D in range with supplementation  May 2024, taking vitamin D replacement  Dec 2024: Vitamin D level still low, taking prescription dose every 2 weeks as directed         The following portions of the patient's history were reviewed and updated as appropriate: allergies, current medications, past family history, past medical history, past social history, past surgical history and problem list.    Review of Systems      Constitutional:  Denies fever or chills   Eyes:  Denies double , blurry vision or eye pain  HENT:  Denies nasal congestion, sore throat or new hearing issues  Respiratory:  Denies cough or shortness of breath or wheezing  Cardiovascular:  Denies palpitations or chest pain  GI:  Denies abdominal pain, nausea, or vomiting, no loose stools, no reflux  Integument:  Denies rash , no open areas  Neurologic:  Denies headache or focal weakness, no dizziness  : no dysuria, or hematuria      Current Outpatient Medications   Medication Sig Dispense Refill    clindamycin (CLEOCIN T) 1 % lotion Apply 1 application topically 2 (two) times a day as needed (ezema) 60 mL 2    ergocalciferol (VITAMIN D2) 50,000 units Take 1 capsule (50,000 Units total) by mouth once a week 12 capsule 1    fluticasone (FLONASE) 50 mcg/act nasal spray 2 sprays into each nostril daily as needed      hydroCHLOROthiazide 50 mg tablet Take 1 tablet (50 mg total) by mouth daily 90 tablet 1    IRON, FERROUS GLUCONATE, PO Take 1 tablet by mouth every other day      levonorgestrel (KYLEENA) 19.5 MG intrauterine device 1 Intra Uterine Device by Intrauterine route once      potassium chloride (Klor-Con M20) 20 mEq tablet Take 2 tablets (40 mEq total) by mouth daily 180 tablet 0     No current facility-administered medications for this visit.       Objective:    /86 (BP Location: Left arm, Patient Position: Sitting, Cuff Size: Large)   Pulse 83   Temp 99.5 °F (37.5 °C) (Temporal)    "Resp 20   Ht 5' 4\" (1.626 m)   Wt 135 kg (298 lb)   SpO2 100%   BMI 51.15 kg/m²        Physical Exam      Constitutional:  Well developed, well nourished, no acute distress, non-toxic appearance   Eyes:  PERRL, conjunctiva normal , non icteric sclera  HENT:  Atraumatic, oropharynx moist. Neck-  supple   Respiratory:  CTA b/l, normal breath sounds, no rales, no wheezing   Cardiovascular:  RRR, no murmurs, no LE edema b/l  GI:  Soft, nondistended, normal bowel sounds x 4, nontender, no organomegaly, no mass, no rebound, no guarding   Neurologic:  no focal deficits noted   Psychiatric:  Speech and behavior appropriate , AAO x 3      Jeanie Posey DO  "

## 2024-12-08 DIAGNOSIS — L70.0 ACNE VULGARIS: ICD-10-CM

## 2024-12-10 RX ORDER — CLINDAMYCIN PHOSPHATE 10 UG/ML
LOTION TOPICAL 2 TIMES DAILY PRN
Qty: 60 ML | Refills: 1 | Status: SHIPPED | OUTPATIENT
Start: 2024-12-10

## 2025-02-05 ENCOUNTER — TELEPHONE (OUTPATIENT)
Age: 25
End: 2025-02-05

## 2025-02-05 ENCOUNTER — OFFICE VISIT (OUTPATIENT)
Age: 25
End: 2025-02-05
Payer: COMMERCIAL

## 2025-02-05 VITALS — TEMPERATURE: 98.2 F

## 2025-02-05 DIAGNOSIS — L70.0 ACNE VULGARIS: ICD-10-CM

## 2025-02-05 PROCEDURE — 99204 OFFICE O/P NEW MOD 45 MIN: CPT | Performed by: REGISTERED NURSE

## 2025-02-05 RX ORDER — TRETINOIN 0.25 MG/G
CREAM TOPICAL
Qty: 45 G | Refills: 3 | Status: CANCELLED | OUTPATIENT
Start: 2025-02-05

## 2025-02-05 RX ORDER — TRETINOIN 0.5 MG/G
CREAM TOPICAL
Qty: 45 G | Refills: 4 | Status: SHIPPED | OUTPATIENT
Start: 2025-02-05

## 2025-02-05 NOTE — PROGRESS NOTES
"[facility] Note     Patient Name: Marlyn Chapin  Encounter Date: 2/5/25     Have you been cared for by a [facility] Dermatologist in the last 3 years and, if so, which description applies to you?    NO.   I am considered a \"new\" patient and must complete all patient intake questions. I am FEMALE/of child-bearing potential.    REVIEW OF SYSTEMS:  Have you recently had or currently have any of the following? Recent fever or chills? No  Any non-healing wound? No  Are you pregnant or planning to become pregnant? No  Are you currently or planning to be nursing or breast feeding? No   PAST MEDICAL HISTORY:  Have you personally ever had or currently have any of the following?  If \"YES,\" then please provide more detail. Skin cancer (such as Melanoma, Basal Cell Carcinoma, Squamous Cell Carcinoma?  No  Tuberculosis, HIV/AIDS, Hepatitis B or C: No  Radiation Treatment No   HISTORY OF IMMUNOSUPPRESSION:   Do you have a history of any of the following:  Systemic Immunosuppression such as Diabetes, Biologic or Immunotherapy, Chemotherapy, Organ Transplantation, Bone Marrow Transplantation or Prednsione?  No    Answering \"YES\" requires the addition of the dotphrase \"IMMUNOSUPPRESSED\" as the first diagnosis of the patient's visit.   FAMILY HISTORY:  Any \"first degree relatives\" (parent, brother, sister, or child) with the following?    Skin Cancer, Pancreatic or Other Cancer? No   PATIENT EXPERIENCE:    Do you want the Dermatologist to perform a COMPLETE skin exam today including a clinical examination under the \"bra and underwear\" areas?  NO  If necessary, do we have your permission to call and leave a detailed message on your Preferred Phone number that includes your specific medical information?  Yes      Allergies   Allergen Reactions    Cefdinir Hives    Nuts - Food Allergy Itching, Other (See Comments) and Swelling      Current Outpatient Medications:     clindamycin (CLEOCIN T) 1 % lotion, Apply " topically 2 (two) times a day as needed (ezema), Disp: 60 mL, Rfl: 1    ergocalciferol (VITAMIN D2) 50,000 units, Take 1 capsule (50,000 Units total) by mouth once a week, Disp: 12 capsule, Rfl: 1    fluticasone (FLONASE) 50 mcg/act nasal spray, 2 sprays into each nostril daily as needed, Disp: , Rfl:     hydroCHLOROthiazide 50 mg tablet, Take 1 tablet (50 mg total) by mouth daily, Disp: 90 tablet, Rfl: 1    IRON, FERROUS GLUCONATE, PO, Take 1 tablet by mouth every other day, Disp: , Rfl:     levonorgestrel (KYLEENA) 19.5 MG intrauterine device, 1 Intra Uterine Device by Intrauterine route once, Disp: , Rfl:     potassium chloride (Klor-Con M20) 20 mEq tablet, Take 2 tablets (40 mEq total) by mouth daily, Disp: 180 tablet, Rfl: 0          Whom besides the patient is providing clinical information about today's encounter?   NO ADDITIONAL HISTORIAN (patient alone provided history)    Physical Exam and Assessment/Plan by Diagnosis:    FURUNCULOSIS   Physical Exam:  Anatomic Location Affected:  armpits  Morphological Description:  few scar and hyperpigmented patches   Pertinent Positives:  Pertinent Negatives:    Additional History of Present Condition:  Patient reports of recurrent boil like lesions underneath the armpit. It began when she was in high school and then stopped for many years but over the past year it started recurring. She was prescribed clindamycin lotion which has been very helpful. She doesn't have any active lesions today.     Assessment and Plan:  Based on a thorough discussion of this condition and the management approach to it (including a comprehensive discussion of the known risks, side effects and potential benefits of treatment), the patient (family) agrees to implement the following specific plan:  Wash areas with Benzoyl peroxide daily   Apply the clindamycin to affected areas twice daily      ACNE VULGARIS    Physical Exam:  Anatomic Locations Involved: Face  Global Assessment: MILD:  LESS  "THAN half the face is involved. Some comedones and some papules and/or pustules.  Scarring Present? NONE  Pertinent Positives:  Pertinent Negatives:    Additional History of Present Condition:  Patient was only using over the counter products that hasn't helped. She'll like treatment recommendation.        TODAY'S PLAN:     PRESCRIPTION MANAGEMENT:  Several treatment options were discussed including topical retinoids and their side effects.     Skin Hygiene:      Wash affected areas (face, chest, and back) TWICE A DAY with a mild cleanser such as Cerave.  Use only mild cleansers (hypoallergenic and without fragrances) and fragrance free detergent (not \"unscented\" products which contain a masking agent); we discussed avoiding irritants/fragranced products.  Apply a good oil-free facial moisturizer AT LEAST TWO TIMES A DAY \" such as Cerave.  Minimize the application of oils and cosmetics to the affected skin.  This includes HAIR PRODUCTS such as \"leave in\" conditioners.  Unless the product specifically states that it \"won't cause acne,\" \"won't clog pores,\" and/or \"is non-comedogenic\" then it may actually CAUSE acne.  If you smoke, STOP. Nicotine increases sebum retention and increased scale within the follicles, forming comedones (blackheads and whiteheads).  Abrasive treatments such as dermabrasion and spa facials may aggravate inflammatory acne.  Do NOT scratch or pick your acne bumps.  The evidence that diet directly affects acne remains weak.  However, diet does affect your overall health.  Eat plenty of fresh fruit and vegetables.  Avoid protein or amino acid supplements, particularly if they contain leucine. Consider a low-glycemic, low-protein and low-dairy diet.  Be mindful that certain medications may cause of aggravate acne.  Make sure to tell your Dermatologist if you start a new prescription, nutritional supplement, and/or herbal remedy.      MORNING Topical Regimen:      Benzoyl Peroxide Wash:  Apply to " skin while in shower/bath; gently lather into affected areas; leave on for 2-3 minutes; then, rinse completely.  Clindamycin 1% lotion IN THE MORNING:  After gently washing and drying your skin, apply this TOPICAL medication evenly over your entire face, avoiding the eyes and corners of the mouth      EVENING Topical Regimen:      Tretinoin 0.05% cream AT LEAST 1 HOUR BEFORE BEDTIME:  Evenly spread a SINGLE pea-sized amount of this medication over your entire face, avoiding the eyes and corners of the mouth.      SYSTEMIC Strategies:      NONE        MEDICAL DECISION MAKING  Treatment Goal:  Resolution of the CHRONIC condition.       Chronic condition is NOT at treatment goal.  It is progressing along its expected course OR is poorly-controlled.          F/U in 3 months time     Scribe Attestation      I,:  Lara Yañez MA am acting as a scribe while in the presence of the attending physician.:       I,:  Kobi Sommer MD personally performed the services described in this documentation    as scribed in my presence.:

## 2025-02-05 NOTE — PATIENT INSTRUCTIONS
"Assessment and Plan:  Based on a thorough discussion of this condition and the management approach to it (including a comprehensive discussion of the known risks, side effects and potential benefits of treatment), the patient (family) agrees to implement the following specific plan:  Apply Benzoyl peroxide 5% or 10 % over the counter, Wash in the morning and apply the clindamycin after wash and once at night    ACNE  Skin Hygiene:     Wash affected areas (face, chest, and back) TWICE A DAY with a mild cleanser such as Cerave.  Use only mild cleansers (hypoallergenic and without fragrances) and fragrance free detergent (not \"unscented\" products which contain a masking agent); we discussed avoiding irritants/fragranced products.  Apply a good oil-free facial moisturizer AT LEAST TWO TIMES A DAY \" such as Cerave.  Minimize the application of oils and cosmetics to the affected skin.  This includes HAIR PRODUCTS such as \"leave in\" conditioners.  Unless the product specifically states that it \"won't cause acne,\" \"won't clog pores,\" and/or \"is non-comedogenic\" then it may actually CAUSE acne.  If you smoke, STOP. Nicotine increases sebum retention and increased scale within the follicles, forming comedones (blackheads and whiteheads).  Abrasive treatments such as dermabrasion and spa facials may aggravate inflammatory acne.  Do NOT scratch or pick your acne bumps.  The evidence that diet directly affects acne remains weak.  However, diet does affect your overall health.  Eat plenty of fresh fruit and vegetables.  Avoid protein or amino acid supplements, particularly if they contain leucine. Consider a low-glycemic, low-protein and low-dairy diet.  Be mindful that certain medications may cause of aggravate acne.  Make sure to tell your Dermatologist if you start a new prescription, nutritional supplement, and/or herbal remedy.     MORNING Topical Regimen:     Benzoyl Peroxide Wash:  Apply to skin while in shower/bath; " gently lather into affected areas; leave on for 2-3 minutes; then, rinse completely.  Clindamycin 1% lotion IN THE MORNING:  After gently washing and drying your skin, apply this TOPICAL medication evenly over your entire face, avoiding the eyes and corners of the mouth     EVENING Topical Regimen:     Tretinoin 0.025% cream AT LEAST 1 HOUR BEFORE BEDTIME:  Evenly spread a SINGLE pea-sized amount of this medication over your entire face, avoiding the eyes and corners of the mouth.

## 2025-02-05 NOTE — TELEPHONE ENCOUNTER
Spoke with patient and informed her of the doctors message. Patient will try over the counter adapalene or she said she would have the brand name filled for slightly cheaper.

## 2025-02-13 DIAGNOSIS — L70.0 ACNE VULGARIS: ICD-10-CM

## 2025-02-13 RX ORDER — CLINDAMYCIN PHOSPHATE 10 UG/ML
LOTION TOPICAL
Qty: 60 ML | Refills: 1 | Status: SHIPPED | OUTPATIENT
Start: 2025-02-13

## 2025-04-28 DIAGNOSIS — I10 ESSENTIAL HYPERTENSION: ICD-10-CM

## 2025-04-29 RX ORDER — HYDROCHLOROTHIAZIDE 50 MG/1
50 TABLET ORAL DAILY
Qty: 90 TABLET | Refills: 1 | Status: SHIPPED | OUTPATIENT
Start: 2025-04-29

## 2025-05-06 ENCOUNTER — OFFICE VISIT (OUTPATIENT)
Age: 25
End: 2025-05-06
Payer: COMMERCIAL

## 2025-05-06 DIAGNOSIS — L70.0 ACNE VULGARIS: Primary | ICD-10-CM

## 2025-05-06 PROCEDURE — 99213 OFFICE O/P EST LOW 20 MIN: CPT

## 2025-05-06 NOTE — PROGRESS NOTES
"[facility] Dermatology Clinic Note     Patient Name: Marlyn Chapin  Encounter Date: 05/06/2025       Have you been cared for by a [facility] Dermatologist in the last 3 years and, if so, which description applies to you? Yes. I have been here within the last 3 years, and my medical history has NOT changed since that time. I am of child-bearing potential.     REVIEW OF SYSTEMS:  Have you recently had or currently have any of the following? No changes in my recent health.   PAST MEDICAL HISTORY:  Have you personally ever had or currently have any of the following?  If \"YES,\" then please provide more detail. No changes in my medical history.   HISTORY OF IMMUNOSUPPRESSION: Do you have a history of any of the following:  Systemic Immunosuppression such as Diabetes, Biologic or Immunotherapy, Chemotherapy, Organ Transplantation, Bone Marrow Transplantation or Prednisone?  No     Answering \"YES\" requires the addition of the dotphrase \"IMMUNOSUPPRESSED\" as the first diagnosis of the patient's visit.   FAMILY HISTORY:  Any \"first degree relatives\" (parent, brother, sister, or child) with the following?    No changes in my family's known health.   PATIENT EXPERIENCE:    Do you want the Dermatologist to perform a COMPLETE skin exam today including a clinical examination under the \"bra and underwear\" areas?  NO  If necessary, do we have your permission to call and leave a detailed message on your Preferred Phone number that includes your specific medical information?  Yes      Allergies   Allergen Reactions    Cefdinir Hives    Nuts - Food Allergy Itching, Other (See Comments) and Swelling      Current Outpatient Medications:     ergocalciferol (VITAMIN D2) 50,000 units, Take 1 capsule (50,000 Units total) by mouth once a week, Disp: 12 capsule, Rfl: 1    fluticasone (FLONASE) 50 mcg/act nasal spray, 2 sprays into each nostril daily as needed, Disp: , Rfl:     hydroCHLOROthiazide 50 mg tablet, take 1 tablet by mouth once " "daily, Disp: 90 tablet, Rfl: 1    IRON, FERROUS GLUCONATE, PO, Take 1 tablet by mouth every other day, Disp: , Rfl:     levonorgestrel (KYLEENA) 19.5 MG intrauterine device, 1 Intra Uterine Device by Intrauterine route once, Disp: , Rfl:     potassium chloride (Klor-Con M20) 20 mEq tablet, Take 2 tablets (40 mEq total) by mouth daily, Disp: 180 tablet, Rfl: 0    tretinoin (Retin-A) 0.05 % cream, Apply topically daily at bedtime, Disp: 45 g, Rfl: 4    clindamycin (CLEOCIN T) 1 % lotion, apply topically to affected area twice a day if needed for EZEMA (Patient not taking: Reported on 5/6/2025), Disp: 60 mL, Rfl: 1              Whom besides the patient is providing clinical information about today's encounter?   NO ADDITIONAL HISTORIAN (patient alone provided history)    Physical Exam and Assessment/Plan by Diagnosis:  ACNE VULGARIS    Physical Exam:  Anatomic Location Affected:  Face  Morphological Description: very few scattered pink papules on forehead     Additional History of Present Condition:  Patient last evaluated on 02/05/2025 by Dr. Sommer she was advised to start Benzoyl peroxide wash, clindamycin 1% lotion in the morning, and Tretinoin 0.05% cream at bedtime. Today, patient states Benzoyl peroxide (10%) was to harsh for her face and it would burn. She started using Clinique wash and tretinoin 0.025% once a day. Patient uses La Roche Posay double repair lotion as moisturizer. Patient notes she still gets breakouts.     Discussed that treatment is directed at improving skin appearance and reducing the likelihood of scarring. Discussed theraputic ladder including topical OTC treatments, topical prescriptions, and oral medications. Discussed side effects as noted below.     Plan today:     AM:  - Start benzoyl peroxide cleanser 4% or lower (over the counter products like Panoxyl, CeraVe and Neutrogena contain this product listed under \"active ingredients\")  - Start clindamycin 1% lotion to affected " skin-After cleansing face in the morning, apply a small amount to entire face. Apply daily. Avoid eyes and corners of the mouth.   - SPF 30 or greater (can be a lotion with SPF like CeraVe AM)/non-comedogenic moisturizer such as CeraVe, Cetaphil or Vanicream.     PM:  - Gentle cleanser, such as CeraVe, Cetaphil or La Roche Posay  - Continue Tretinoin 0.025%% qHS. Educated that this medication can be drying and irritating. Apply a pea sized amount to entire face. Avoid eyes and mouth.   - non-comedogenic moisturizer such as CeraVe, Cetaphil or LaRoche Posay. Can be used on top of retinoid.      Follow up in 6 months, sooner for concerns.      Scribe Attestation      I,:  Ruma Schulz MA am acting as a scribe while in the presence of the attending physician.:       I,:  Jocelyne Morris PA-C personally performed the services described in this documentation    as scribed in my presence.:

## 2025-05-06 NOTE — PATIENT INSTRUCTIONS
"Plan today:     AM:  - Start benzoyl peroxide cleanser 2% (over the counter products like Panoxyl, CeraVe and Neutrogena contain this product listed under \"active ingredients\")  - Start clindamycin 1% lotion to affected skin.  - SPF 30 or greater (can be a lotion with SPF like CeraVe AM)/non-comedogenic moisturizer such as CeraVe, Cetaphil or Vanicream.     PM:  - Gentle cleanser, such as CeraVe, Cetaphil or La Roche Posay  - Start Tretinoin 0.05% qHS. Educated that this medication can be drying and irritating. Start by applying a pea-sized amount of product 2 nights per week, then increase to nightly as tolerated.  - non-comedogenic moisturizer such as CeraVe, Cetaphil or Vanicream. Can be used on top of retinoid.    Call with any questions or concerns before next follow-up visit; do not stop medications abruptly without consulting provider.    Follow up in 6 months, sooner for concerns.    COMMON POSSIBLE SIDE EFFECTS OF MEDICATIONS    Retinoids - dryness, redness, increased sun sensitivity.  Benzoyl peroxide - drying, redness, bleaching of clothes, towels and sheets, allergy.  Doxycycline - headaches; dizziness; irritation of the throat; nail changes; discoloration of teeth.  Sun sensitivity - even if you have dark skin, this medicine can make you burn more easily.  Make sure you protect yourself from the sun, either by avoiding being outside between 11 AM and 3 PM, wearing and reapplying sunscreen/sunblock, or wearing sun protective clothing  Nausea/vomiting - if you experience nausea with this medication, take it with food.    WHEN AND WHERE TO CALL WITH CONCERNS  We are here to help!  If you experience any unusual symptoms, then stop taking or using the medication and call our office at [phone] ([phone]).  It is better to be safe than to be sorry!    "

## 2025-06-11 ENCOUNTER — TELEPHONE (OUTPATIENT)
Age: 25
End: 2025-06-11

## 2025-06-11 NOTE — TELEPHONE ENCOUNTER
Called patient regarding skin irritation from acne topicals. She states her skin was red and irritated with flaking. She stopped using the acne topicals and is now using her Clinique facial cleanser and la roche posay moisturizer. She states she was on clindamycin and tretinoin in the past and was fine. Her skin  may have been reacting to the benzoyl peroxide wash.     Recommend patient use gentle skin care products until her irritation resolves. Can use Dove bar for sensitive skin and water to wash face. Can use a moisturizer such as CeraVe, Cetaphil or La roche posay double repair moisturizer.     Once patients skin returns to normal,  can consider re-introducing the retinoid in the evening and seeing how her skin does. She can use a gentle facial cleanser in the morning and in the evening such as CeraVe or Cetaphil and a moisturizer such as CeraVe AM in the morning and CeraVe PM in the evening.    Recommend sooner follow-up for evaluation.     Will route this message to clerical staff.  Jocelyne Morris PA-C

## 2025-06-16 DIAGNOSIS — E55.9 VITAMIN D DEFICIENCY: ICD-10-CM

## 2025-06-16 DIAGNOSIS — E87.6 HYPOKALEMIA: ICD-10-CM

## 2025-06-17 RX ORDER — ERGOCALCIFEROL 1.25 MG/1
50000 CAPSULE, LIQUID FILLED ORAL WEEKLY
Qty: 12 CAPSULE | Refills: 0 | Status: SHIPPED | OUTPATIENT
Start: 2025-06-17 | End: 2025-06-23 | Stop reason: SDUPTHER

## 2025-06-17 RX ORDER — POTASSIUM CHLORIDE 1500 MG/1
40 TABLET, EXTENDED RELEASE ORAL DAILY
Qty: 180 TABLET | Refills: 0 | Status: SHIPPED | OUTPATIENT
Start: 2025-06-17

## 2025-06-23 ENCOUNTER — OFFICE VISIT (OUTPATIENT)
Dept: INTERNAL MEDICINE CLINIC | Facility: OTHER | Age: 25
End: 2025-06-23
Payer: COMMERCIAL

## 2025-07-16 ENCOUNTER — OFFICE VISIT (OUTPATIENT)
Age: 25
End: 2025-07-16
Payer: COMMERCIAL

## 2025-07-16 VITALS — WEIGHT: 293 LBS | BODY MASS INDEX: 52.7 KG/M2 | TEMPERATURE: 98.7 F

## 2025-07-16 DIAGNOSIS — L72.3 INFLAMED SEBACEOUS CYST: ICD-10-CM

## 2025-07-16 DIAGNOSIS — L70.0 ACNE VULGARIS: Primary | ICD-10-CM

## 2025-07-16 PROCEDURE — 99214 OFFICE O/P EST MOD 30 MIN: CPT

## 2025-07-16 RX ORDER — CLINDAMYCIN PHOSPHATE 10 UG/ML
LOTION TOPICAL
Qty: 60 ML | Refills: 2 | Status: SHIPPED | OUTPATIENT
Start: 2025-07-16

## 2025-07-16 NOTE — PROGRESS NOTES
"[facility] Dermatology Clinic Note     Patient Name: Marlyn Chapin  Encounter Date: 07/16/2025       Have you been cared for by a [facility] Dermatologist in the last 3 years and, if so, which description applies to you? Yes. I have been here within the last 3 years, and my medical history has NOT changed since that time. I am of child-bearing potential.     REVIEW OF SYSTEMS:  Have you recently had or currently have any of the following? No changes in my recent health.   PAST MEDICAL HISTORY:  Have you personally ever had or currently have any of the following?  If \"YES,\" then please provide more detail. No changes in my medical history.   HISTORY OF IMMUNOSUPPRESSION: Do you have a history of any of the following:  Systemic Immunosuppression such as Diabetes, Biologic or Immunotherapy, Chemotherapy, Organ Transplantation, Bone Marrow Transplantation or Prednisone?  No     Answering \"YES\" requires the addition of the dotphrase \"IMMUNOSUPPRESSED\" as the first diagnosis of the patient's visit.   FAMILY HISTORY:  Any \"first degree relatives\" (parent, brother, sister, or child) with the following?    No changes in my family's known health.   PATIENT EXPERIENCE:    Do you want the Dermatologist to perform a COMPLETE skin exam today including a clinical examination under the \"bra and underwear\" areas?  NO  If necessary, do we have your permission to call and leave a detailed message on your Preferred Phone number that includes your specific medical information?  Yes      Allergies[1] Current Medications[2]        Whom besides the patient is providing clinical information about today's encounter?   NO ADDITIONAL HISTORIAN (patient alone provided history)    Physical Exam and Assessment/Plan by Diagnosis:    ACNE VULGARIS  Physical Exam:  Anatomic Location Affected:  Face  Morphological Description: very few scattered pink papules on forehead and cheeks      Additional History of Present Condition:  Patient last " "evaluated on 05/06/2025 and is being seen for reevaluation of her acne. She notes she had a reaction to the benzoyl peroxide 10% wash she was using. She restarted the benzoyl peroxide wash after her skin returned to normal and uses a 2.5% strength every few days. She uses her Clinique facial cleanser. Patient notes she will get a pimple here and there.      Discussed that treatment is directed at improving skin appearance and reducing the likelihood of scarring. Discussed theraputic ladder including topical OTC treatments, topical prescriptions, and oral medications. Discussed side effects as noted below.     Plan today:     AM:  - Continue  benzoyl peroxide cleanser 2.5% 2-3 days a week (over the counter products like Panoxyl, CeraVe and Neutrogena contain this product listed under \"active ingredients\")  - Start clindamycin 1% lotion to affected skin-After cleansing face in the morning, apply a small amount to entire face. Apply 2-3 times a day. Avoid eyes and corners of the mouth.   - SPF 30 or greater (can be a lotion with SPF like CeraVe AM)/non-comedogenic moisturizer such as CeraVe, Cetaphil or Vanicream.    Can use Clinique facial cleanser on days when not using benzoyl peroxide wash or clindamycin lotion.   PM:  - Gentle cleanser such as Clinque.  - Can try over the counter Adapalene 0.1% gel. Educated that this medication can be drying and irritating. Apply a pea sized amount to entire face. Avoid eyes and mouth. Can use 1 day a week.   - non-comedogenic moisturizer such as CeraVe, Cetaphil or LaRoche Posay. Can be used on top of retinoid.     RUPTURED CYST   Physical Exam:  Anatomic Location Affected:  left axilla   Morphological Description:  minimally draining cyst of the left axilla, no erythema or induration, no pain on palpation     Additional History of Present Condition: Patient notes a boil on her left armpit onset Friday. Patient notes she drained with a needle on Monday. Patient notes she had " previous symptoms in the past (once or twice), but it was not ask painful as this episode. She notes it was painful to put her arm down. She denies any flares in other areas.     Assessment and Plan:  Based on a thorough discussion of this condition and the management approach to it (including a comprehensive discussion of the known risks, side effects and potential benefits of treatment), the patient (family) agrees to implement the following specific plan:  Discussed option of oral doxycyline 100mg twice a day for anti-inflammatory purposes, patient deferred at this time.    Recommend using a gentle cleanser such as benzoyl peroxide wash when in shower.  Can keep area covered with a bandage to prevent further irritation.     Follow up in 6 months, sooner for concerns.    Scribe Attestation      I,:  Latisha Watts MA am acting as a scribe while in the presence of the attending physician.:       I,:  Jocelyne Morris PA-C personally performed the services described in this documentation    as scribed in my presence.:                [1]   Allergies  Allergen Reactions    Cefdinir Hives    Nuts - Food Allergy Itching, Other (See Comments) and Swelling   [2]   Current Outpatient Medications:     clindamycin (CLEOCIN T) 1 % lotion, apply topically to affected area twice a day if needed for EZEMA, Disp: 60 mL, Rfl: 1    ergocalciferol (VITAMIN D2) 50,000 units, Take 1 capsule (50,000 Units total) by mouth once a week, Disp: 12 capsule, Rfl: 3    fluticasone (FLONASE) 50 mcg/act nasal spray, 2 sprays into each nostril daily as needed, Disp: , Rfl:     hydroCHLOROthiazide 50 mg tablet, take 1 tablet by mouth once daily, Disp: 90 tablet, Rfl: 1    IRON, FERROUS GLUCONATE, PO, Take 1 tablet by mouth every other day, Disp: , Rfl:     levonorgestrel (KYLEENA) 19.5 MG intrauterine device, 1 Intra Uterine Device by Intrauterine route once, Disp: , Rfl:     potassium chloride (Klor-Con M20) 20 mEq tablet, Take 2 tablets (40 mEq  total) by mouth daily, Disp: 180 tablet, Rfl: 0    tretinoin (Retin-A) 0.05 % cream, Apply topically daily at bedtime, Disp: 45 g, Rfl: 4